# Patient Record
Sex: MALE | Race: WHITE | NOT HISPANIC OR LATINO | Employment: UNEMPLOYED | URBAN - METROPOLITAN AREA
[De-identification: names, ages, dates, MRNs, and addresses within clinical notes are randomized per-mention and may not be internally consistent; named-entity substitution may affect disease eponyms.]

---

## 2023-05-03 ENCOUNTER — DOCUMENTATION (OUTPATIENT)
Dept: CARE COORDINATION | Facility: CLINIC | Age: 63
End: 2023-05-03

## 2023-05-31 ENCOUNTER — DOCUMENTATION (OUTPATIENT)
Dept: CARE COORDINATION | Facility: CLINIC | Age: 63
End: 2023-05-31

## 2023-12-17 ENCOUNTER — APPOINTMENT (OUTPATIENT)
Dept: RADIOLOGY | Facility: HOSPITAL | Age: 63
End: 2023-12-17
Payer: MEDICAID

## 2023-12-17 ENCOUNTER — HOSPITAL ENCOUNTER (EMERGENCY)
Facility: HOSPITAL | Age: 63
Discharge: HOME | End: 2023-12-17
Attending: EMERGENCY MEDICINE
Payer: MEDICAID

## 2023-12-17 ENCOUNTER — APPOINTMENT (OUTPATIENT)
Dept: CARDIOLOGY | Facility: HOSPITAL | Age: 63
End: 2023-12-17
Payer: MEDICAID

## 2023-12-17 VITALS
DIASTOLIC BLOOD PRESSURE: 90 MMHG | TEMPERATURE: 96.8 F | OXYGEN SATURATION: 97 % | BODY MASS INDEX: 35.35 KG/M2 | SYSTOLIC BLOOD PRESSURE: 140 MMHG | HEART RATE: 65 BPM | RESPIRATION RATE: 16 BRPM | WEIGHT: 246.91 LBS | HEIGHT: 70 IN

## 2023-12-17 DIAGNOSIS — R07.89 OTHER CHEST PAIN: Primary | ICD-10-CM

## 2023-12-17 LAB
ALBUMIN SERPL BCP-MCNC: 4.2 G/DL (ref 3.4–5)
ALP SERPL-CCNC: 65 U/L (ref 33–136)
ALT SERPL W P-5'-P-CCNC: 17 U/L (ref 10–52)
ANION GAP SERPL CALC-SCNC: 11 MMOL/L (ref 10–20)
AST SERPL W P-5'-P-CCNC: 14 U/L (ref 9–39)
BASOPHILS # BLD AUTO: 0.06 X10*3/UL (ref 0–0.1)
BASOPHILS NFR BLD AUTO: 0.6 %
BILIRUB SERPL-MCNC: 0.5 MG/DL (ref 0–1.2)
BUN SERPL-MCNC: 13 MG/DL (ref 6–23)
CALCIUM SERPL-MCNC: 9.2 MG/DL (ref 8.6–10.3)
CARDIAC TROPONIN I PNL SERPL HS: 8 NG/L (ref 0–20)
CARDIAC TROPONIN I PNL SERPL HS: 8 NG/L (ref 0–20)
CHLORIDE SERPL-SCNC: 104 MMOL/L (ref 98–107)
CO2 SERPL-SCNC: 29 MMOL/L (ref 21–32)
CREAT SERPL-MCNC: 1.22 MG/DL (ref 0.5–1.3)
D DIMER PPP FEU-MCNC: 476 NG/ML FEU
EOSINOPHIL # BLD AUTO: 0.13 X10*3/UL (ref 0–0.7)
EOSINOPHIL NFR BLD AUTO: 1.4 %
ERYTHROCYTE [DISTWIDTH] IN BLOOD BY AUTOMATED COUNT: 13.5 % (ref 11.5–14.5)
GFR SERPL CREATININE-BSD FRML MDRD: 67 ML/MIN/1.73M*2
GLUCOSE SERPL-MCNC: 115 MG/DL (ref 74–99)
HCT VFR BLD AUTO: 42.2 % (ref 41–52)
HGB BLD-MCNC: 14.4 G/DL (ref 13.5–17.5)
HOLD SPECIMEN: NORMAL
HOLD SPECIMEN: NORMAL
IMM GRANULOCYTES # BLD AUTO: 0.05 X10*3/UL (ref 0–0.7)
IMM GRANULOCYTES NFR BLD AUTO: 0.5 % (ref 0–0.9)
LYMPHOCYTES # BLD AUTO: 1.32 X10*3/UL (ref 1.2–4.8)
LYMPHOCYTES NFR BLD AUTO: 13.8 %
MCH RBC QN AUTO: 38.2 PG (ref 26–34)
MCHC RBC AUTO-ENTMCNC: 34.1 G/DL (ref 32–36)
MCV RBC AUTO: 112 FL (ref 80–100)
MONOCYTES # BLD AUTO: 0.5 X10*3/UL (ref 0.1–1)
MONOCYTES NFR BLD AUTO: 5.2 %
NEUTROPHILS # BLD AUTO: 7.54 X10*3/UL (ref 1.2–7.7)
NEUTROPHILS NFR BLD AUTO: 78.5 %
NRBC BLD-RTO: 0 /100 WBCS (ref 0–0)
PLATELET # BLD AUTO: 216 X10*3/UL (ref 150–450)
POTASSIUM SERPL-SCNC: 4 MMOL/L (ref 3.5–5.3)
PROT SERPL-MCNC: 7.1 G/DL (ref 6.4–8.2)
RBC # BLD AUTO: 3.77 X10*6/UL (ref 4.5–5.9)
SODIUM SERPL-SCNC: 140 MMOL/L (ref 136–145)
WBC # BLD AUTO: 9.6 X10*3/UL (ref 4.4–11.3)

## 2023-12-17 PROCEDURE — 36415 COLL VENOUS BLD VENIPUNCTURE: CPT | Performed by: EMERGENCY MEDICINE

## 2023-12-17 PROCEDURE — 71045 X-RAY EXAM CHEST 1 VIEW: CPT | Performed by: RADIOLOGY

## 2023-12-17 PROCEDURE — 85025 COMPLETE CBC W/AUTO DIFF WBC: CPT | Performed by: EMERGENCY MEDICINE

## 2023-12-17 PROCEDURE — 99284 EMERGENCY DEPT VISIT MOD MDM: CPT | Performed by: EMERGENCY MEDICINE

## 2023-12-17 PROCEDURE — 84484 ASSAY OF TROPONIN QUANT: CPT | Performed by: EMERGENCY MEDICINE

## 2023-12-17 PROCEDURE — 71045 X-RAY EXAM CHEST 1 VIEW: CPT | Mod: FY

## 2023-12-17 PROCEDURE — 93005 ELECTROCARDIOGRAM TRACING: CPT

## 2023-12-17 PROCEDURE — 99283 EMERGENCY DEPT VISIT LOW MDM: CPT | Mod: 25

## 2023-12-17 PROCEDURE — 85379 FIBRIN DEGRADATION QUANT: CPT | Performed by: EMERGENCY MEDICINE

## 2023-12-17 PROCEDURE — 80053 COMPREHEN METABOLIC PANEL: CPT | Performed by: EMERGENCY MEDICINE

## 2023-12-17 RX ORDER — ASPIRIN 81 MG/1
TABLET ORAL
Status: ON HOLD | COMMUNITY
Start: 2022-07-28 | End: 2024-02-08 | Stop reason: SDUPTHER

## 2023-12-17 ASSESSMENT — COLUMBIA-SUICIDE SEVERITY RATING SCALE - C-SSRS
1. IN THE PAST MONTH, HAVE YOU WISHED YOU WERE DEAD OR WISHED YOU COULD GO TO SLEEP AND NOT WAKE UP?: NO
2. HAVE YOU ACTUALLY HAD ANY THOUGHTS OF KILLING YOURSELF?: NO
6. HAVE YOU EVER DONE ANYTHING, STARTED TO DO ANYTHING, OR PREPARED TO DO ANYTHING TO END YOUR LIFE?: NO

## 2023-12-17 ASSESSMENT — PAIN DESCRIPTION - DESCRIPTORS: DESCRIPTORS: PRESSURE

## 2023-12-17 ASSESSMENT — PAIN SCALES - GENERAL
PAINLEVEL_OUTOF10: 0 - NO PAIN
PAINLEVEL_OUTOF10: 3

## 2023-12-17 ASSESSMENT — PAIN - FUNCTIONAL ASSESSMENT
PAIN_FUNCTIONAL_ASSESSMENT: 0-10
PAIN_FUNCTIONAL_ASSESSMENT: 0-10

## 2023-12-17 ASSESSMENT — PAIN DESCRIPTION - LOCATION: LOCATION: CHEST

## 2023-12-17 ASSESSMENT — PAIN DESCRIPTION - PAIN TYPE: TYPE: ACUTE PAIN

## 2023-12-17 NOTE — DISCHARGE INSTRUCTIONS
Continue home medications.    Call your cardiologist in the morning for outpatient follow-up.    Return for worsening symptoms or concerns.

## 2023-12-17 NOTE — ED PROVIDER NOTES
[facility]  ED  Provider Note  12/17/2023 10:28 AM  AC03/AC03        History of Present Illness:   Luis Bucio is a 63 y.o. male presenting to the ED for chest discomfort, beginning 2 hours prior to arrival.  The complaint has been resolved, moderate in severity, and worsened by nothing.  Patient awake this morning feeling very diaphoretic.  He is not nauseous or short of breath.  He had about an hour and a half of chest discomfort which resolved prior to ED arrival.  He denies any current sweats or chest discomfort.  He is status post cardiac stents and MI last year.  He denies any current symptomology feels well.  There is no recent change in medication or diet.  He has had no fever or chills.  He has no leg pain swelling or edema.      Review of Systems:   Pertinent positives and review of systems as noted above.  Remaining 10 review of systems is negative or noncontributory to today's episode of care.  Review of Systems       --------------------------------------------- PAST HISTORY ---------------------------------------------  Past Medical History:  has a past medical history of Other specified health status.    Past Surgical History:  has a past surgical history that includes Other surgical history (09/06/2016).    Social History:      Family History: family history is not on file. Unless otherwise noted, family history is non contributory    Patient's Medications   New Prescriptions    No medications on file   Previous Medications    ASPIRIN (ADULT LOW DOSE ASPIRIN) 81 MG EC TABLET    1 tablet DAILY (route: oral)   Modified Medications    No medications on file   Discontinued Medications    No medications on file      The patient’s home medications have been reviewed.    Allergies: Patient has no allergy information on record.    -------------------------------------------------- RESULTS -------------------------------------------------  All laboratory and radiology results have been  personally reviewed by myself   LABS:  Labs Reviewed   COMPREHENSIVE METABOLIC PANEL - Abnormal       Result Value    Glucose 115 (*)     Sodium 140      Potassium 4.0      Chloride 104      Bicarbonate 29      Anion Gap 11      Urea Nitrogen 13      Creatinine 1.22      eGFR 67      Calcium 9.2      Albumin 4.2      Alkaline Phosphatase 65      Total Protein 7.1      AST 14      Bilirubin, Total 0.5      ALT 17     CBC WITH AUTO DIFFERENTIAL - Abnormal    WBC 9.6      nRBC 0.0      RBC 3.77 (*)     Hemoglobin 14.4      Hematocrit 42.2       (*)     MCH 38.2 (*)     MCHC 34.1      RDW 13.5      Platelets 216      Neutrophils % 78.5      Immature Granulocytes %, Automated 0.5      Lymphocytes % 13.8      Monocytes % 5.2      Eosinophils % 1.4      Basophils % 0.6      Neutrophils Absolute 7.54      Immature Granulocytes Absolute, Automated 0.05      Lymphocytes Absolute 1.32      Monocytes Absolute 0.50      Eosinophils Absolute 0.13      Basophils Absolute 0.06     D-DIMER, VTE EXCLUSION - Normal    D-Dimer, Quantitative VTE Exclusion 476      Narrative:     The VTE Exclusion D-Dimer assay is reported in ng/mL Fibrinogen Equivalent Units (FEU).    Per 's instructions for use, a value of less than 500 ng/mL (FEU) may help to exclude DVT or PE in outpatients when the assay is used with a clinical pretest probability assessment.([facility] must utilize and document eCalc 'Wells Score Deep Vein Thrombosis Risk' for DVT exclusion only. Emergency Department should utilize  Guidelines for Emergency Department Use of the VTE Exclusion D-Dimer and Clinical Pretest probability assessment model for DVT or PE exclusion.)   SERIAL TROPONIN-INITIAL - Normal    Troponin I, High Sensitivity 8      Narrative:     Less than 99th percentile of normal range cutoff-  Female and children under 18 years old <14 ng/L; Male <21 ng/L: Negative  Repeat testing should be performed if clinically indicated.     Female and children  under 18 years old 14-50 ng/L; Male 21-50 ng/L:  Consistent with possible cardiac damage and possible increased clinical   risk. Serial measurements may help to assess extent of myocardial damage.     >50 ng/L: Consistent with cardiac damage, increased clinical risk and  myocardial infarction. Serial measurements may help assess extent of   myocardial damage.      NOTE: Children less than 1 year old may have higher baseline troponin   levels and results should be interpreted in conjunction with the overall   clinical context.     NOTE: Troponin I testing is performed using a different   testing methodology at [facility] than at other   [facility]. Direct result comparisons should only   be made within the same method.   SERIAL TROPONIN, 1 HOUR - Normal    Troponin I, High Sensitivity 8      Narrative:     Less than 99th percentile of normal range cutoff-  Female and children under 18 years old <14 ng/L; Male <21 ng/L: Negative  Repeat testing should be performed if clinically indicated.     Female and children under 18 years old 14-50 ng/L; Male 21-50 ng/L:  Consistent with possible cardiac damage and possible increased clinical   risk. Serial measurements may help to assess extent of myocardial damage.     >50 ng/L: Consistent with cardiac damage, increased clinical risk and  myocardial infarction. Serial measurements may help assess extent of   myocardial damage.      NOTE: Children less than 1 year old may have higher baseline troponin   levels and results should be interpreted in conjunction with the overall   clinical context.     NOTE: Troponin I testing is performed using a different   testing methodology at [facility] than at other   [facility]. Direct result comparisons should only   be made within the same method.   TROPONIN SERIES- (INITIAL, 1 HR)    Narrative:     The following orders were created for panel order Troponin I Series, High Sensitivity (0, 1  HR).  Procedure                               Abnormality         Status                     ---------                               -----------         ------                     Troponin I, High Sensiti...[[id number]]  Normal              Final result               Troponin, High Sensitivi...[[id number]]  Normal              Final result                 Please view results for these tests on the individual orders.   GRAY TOP       EKG: Normal sinus rhythm at 60 bpm, LVH by voltage, reports this abnormalities noted.  No acute ST elevations.  Interpreted by CATRACHO Mead MD    RADIOLOGY:  Interpreted by Radiologist.  XR chest 1 view   Final Result   No acute cardiopulmonary process is evident.        MACRO:   None        Signed by: Rajiv Alexander 12/17/2023 11:01 AM   Dictation workstation:   [id number]          No results found for this or any previous visit (from the past 4464 hour(s)).  ------------------------- NURSING NOTES AND VITALS REVIEWED ---------------------------   The nursing notes within the ED encounter and vital signs as below have been reviewed.   There were no vitals taken for this visit.  Oxygen Saturation Interpretation: Normal      ---------------------------------------------------PHYSICAL EXAM--------------------------------------  Physical Exam   Constitutional/General: Alert and oriented x3, well appearing, non toxic in NAD  Head: Normocephalic and atraumatic  Eyes: PERRL, EOMI, conjunctiva normal, sclera non icteric  Mouth: Oropharynx clear, handling secretions, no trismus, no asymmetry of the posterior oropharynx or uvular edema  Neck: Supple, full ROM, non tender to palpation in the midline, no stridor, no crepitus, no meningeal signs  Respiratory: Lungs clear to auscultation bilaterally, no wheezes, rales, or rhonchi. Not in respiratory distress  Cardiovascular:  Regular rate. Regular rhythm. No murmurs, gallops, or rubs. 2+ distal pulses  Chest: No chest wall tenderness  GI:  Abdomen  Soft, Non tender, Non distended.  +BS. No organomegaly, no palpable masses,  No rebound, guarding, or rigidity.   Musculoskeletal: Moves all extremities x 4. Warm and well perfused, no clubbing, cyanosis, or edema. Capillary refill <3 seconds  Integument: skin warm and dry. No rashes.   Lymphatic: no lymphadenopathy noted  Neurologic: GCS 15, no focal deficits, symmetric strength 5/5 in the upper and lower extremities bilaterally  Psychiatric: Normal Affect    Procedures    ------------------------------ ED COURSE/MEDICAL DECISION MAKING----------------------    Medical Decision Making:   Patient is a 63-year-old male status post multiple cardiac stents in the past.  He presented today for evaluation of diaphoresis upon awakening, some mild chest discomfort that lasted about an hour.  All the symptoms resolved prior to ED presentation.  He has not had any similar symptoms since he had his stents placed.  His EKG shows no acute changes.  His cardiac enzymes are normal x 2.  The chest x-ray is negative.  D-dimer testing is negative.  He has no abdominal epigastric tenderness or pulsatile mass.  He is stable for further outpatient evaluation with cardiology.  He was advised to return to the ER for any worsening symptoms or concerns.  Diagnoses as of 12/17/23 9040   Other chest pain      Counseling:   The emergency provider has spoken with the patient and discussed today’s results, in addition to providing specific details for the plan of care and counseling regarding the diagnosis and prognosis.  Questions are answered at this time and they are agreeable with the plan.      --------------------------------- IMPRESSION AND DISPOSITION ---------------------------------        IMPRESSION  1. Other chest pain        DISPOSITION  Disposition: Discharge to home  Patient condition is good      Billing Provider Critical Care Time: 0 minutes     Bobby Mead MD  12/17/23 0743

## 2023-12-21 ENCOUNTER — APPOINTMENT (OUTPATIENT)
Dept: RADIOLOGY | Facility: HOSPITAL | Age: 63
End: 2023-12-21
Payer: MEDICAID

## 2023-12-21 ENCOUNTER — HOSPITAL ENCOUNTER (EMERGENCY)
Facility: HOSPITAL | Age: 63
Discharge: OTHER INSTITUTION | End: 2023-12-22
Attending: EMERGENCY MEDICINE
Payer: MEDICAID

## 2023-12-21 DIAGNOSIS — K85.90 ACUTE PANCREATITIS, UNSPECIFIED COMPLICATION STATUS, UNSPECIFIED PANCREATITIS TYPE (HHS-HCC): Primary | ICD-10-CM

## 2023-12-21 DIAGNOSIS — D72.829 LEUKOCYTOSIS, UNSPECIFIED TYPE: ICD-10-CM

## 2023-12-21 LAB
ALBUMIN SERPL BCP-MCNC: 4.5 G/DL (ref 3.4–5)
ALP SERPL-CCNC: 82 U/L (ref 33–136)
ALT SERPL W P-5'-P-CCNC: 90 U/L (ref 10–52)
AMORPH CRY #/AREA UR COMP ASSIST: ABNORMAL /HPF
ANION GAP SERPL CALC-SCNC: 14 MMOL/L (ref 10–20)
APPEARANCE UR: ABNORMAL
AST SERPL W P-5'-P-CCNC: 55 U/L (ref 9–39)
BASOPHILS # BLD AUTO: 0.04 X10*3/UL (ref 0–0.1)
BASOPHILS NFR BLD AUTO: 0.2 %
BILIRUB SERPL-MCNC: 2.1 MG/DL (ref 0–1.2)
BILIRUB UR STRIP.AUTO-MCNC: NEGATIVE MG/DL
BUN SERPL-MCNC: 22 MG/DL (ref 6–23)
CALCIUM SERPL-MCNC: 9.3 MG/DL (ref 8.6–10.3)
CHLORIDE SERPL-SCNC: 102 MMOL/L (ref 98–107)
CO2 SERPL-SCNC: 26 MMOL/L (ref 21–32)
COLOR UR: ABNORMAL
CREAT SERPL-MCNC: 1.21 MG/DL (ref 0.5–1.3)
EOSINOPHIL # BLD AUTO: 0.01 X10*3/UL (ref 0–0.7)
EOSINOPHIL NFR BLD AUTO: 0 %
ERYTHROCYTE [DISTWIDTH] IN BLOOD BY AUTOMATED COUNT: 13.4 % (ref 11.5–14.5)
FLUAV RNA RESP QL NAA+PROBE: NOT DETECTED
FLUBV RNA RESP QL NAA+PROBE: NOT DETECTED
GFR SERPL CREATININE-BSD FRML MDRD: 68 ML/MIN/1.73M*2
GLUCOSE SERPL-MCNC: 122 MG/DL (ref 74–99)
GLUCOSE UR STRIP.AUTO-MCNC: NEGATIVE MG/DL
HCT VFR BLD AUTO: 44.2 % (ref 41–52)
HGB BLD-MCNC: 15.5 G/DL (ref 13.5–17.5)
HOLD SPECIMEN: NORMAL
HOLD SPECIMEN: NORMAL
IMM GRANULOCYTES # BLD AUTO: 0.08 X10*3/UL (ref 0–0.7)
IMM GRANULOCYTES NFR BLD AUTO: 0.4 % (ref 0–0.9)
KETONES UR STRIP.AUTO-MCNC: ABNORMAL MG/DL
LACTATE SERPL-SCNC: 1.5 MMOL/L (ref 0.4–2)
LEUKOCYTE ESTERASE UR QL STRIP.AUTO: NEGATIVE
LIPASE SERPL-CCNC: 698 U/L (ref 9–82)
LYMPHOCYTES # BLD AUTO: 2 X10*3/UL (ref 1.2–4.8)
LYMPHOCYTES NFR BLD AUTO: 9.7 %
MCH RBC QN AUTO: 38.4 PG (ref 26–34)
MCHC RBC AUTO-ENTMCNC: 35.1 G/DL (ref 32–36)
MCV RBC AUTO: 109 FL (ref 80–100)
MONOCYTES # BLD AUTO: 0.88 X10*3/UL (ref 0.1–1)
MONOCYTES NFR BLD AUTO: 4.3 %
MUCOUS THREADS #/AREA URNS AUTO: ABNORMAL /LPF
NEUTROPHILS # BLD AUTO: 17.69 X10*3/UL (ref 1.2–7.7)
NEUTROPHILS NFR BLD AUTO: 85.4 %
NITRITE UR QL STRIP.AUTO: NEGATIVE
NRBC BLD-RTO: 0 /100 WBCS (ref 0–0)
PH UR STRIP.AUTO: 5 [PH]
PLATELET # BLD AUTO: 266 X10*3/UL (ref 150–450)
POTASSIUM SERPL-SCNC: 4 MMOL/L (ref 3.5–5.3)
PROT SERPL-MCNC: 7.4 G/DL (ref 6.4–8.2)
PROT UR STRIP.AUTO-MCNC: ABNORMAL MG/DL
RBC # BLD AUTO: 4.04 X10*6/UL (ref 4.5–5.9)
RBC # UR STRIP.AUTO: NEGATIVE /UL
RBC #/AREA URNS AUTO: ABNORMAL /HPF
SARS-COV-2 RNA RESP QL NAA+PROBE: NOT DETECTED
SODIUM SERPL-SCNC: 138 MMOL/L (ref 136–145)
SP GR UR STRIP.AUTO: 1.03
UROBILINOGEN UR STRIP.AUTO-MCNC: 2 MG/DL
WBC # BLD AUTO: 20.7 X10*3/UL (ref 4.4–11.3)
WBC #/AREA URNS AUTO: ABNORMAL /HPF

## 2023-12-21 PROCEDURE — 36415 COLL VENOUS BLD VENIPUNCTURE: CPT | Performed by: EMERGENCY MEDICINE

## 2023-12-21 PROCEDURE — 2500000004 HC RX 250 GENERAL PHARMACY W/ HCPCS (ALT 636 FOR OP/ED): Mod: SE

## 2023-12-21 PROCEDURE — 80061 LIPID PANEL: CPT

## 2023-12-21 PROCEDURE — 74176 CT ABD & PELVIS W/O CONTRAST: CPT | Performed by: RADIOLOGY

## 2023-12-21 PROCEDURE — 81001 URINALYSIS AUTO W/SCOPE: CPT | Performed by: EMERGENCY MEDICINE

## 2023-12-21 PROCEDURE — 74176 CT ABD & PELVIS W/O CONTRAST: CPT

## 2023-12-21 PROCEDURE — 96374 THER/PROPH/DIAG INJ IV PUSH: CPT | Mod: 59

## 2023-12-21 PROCEDURE — 96361 HYDRATE IV INFUSION ADD-ON: CPT

## 2023-12-21 PROCEDURE — 96366 THER/PROPH/DIAG IV INF ADDON: CPT

## 2023-12-21 PROCEDURE — 96365 THER/PROPH/DIAG IV INF INIT: CPT

## 2023-12-21 PROCEDURE — 85025 COMPLETE CBC W/AUTO DIFF WBC: CPT | Performed by: EMERGENCY MEDICINE

## 2023-12-21 PROCEDURE — 80053 COMPREHEN METABOLIC PANEL: CPT | Performed by: EMERGENCY MEDICINE

## 2023-12-21 PROCEDURE — 82077 ASSAY SPEC XCP UR&BREATH IA: CPT

## 2023-12-21 PROCEDURE — 99285 EMERGENCY DEPT VISIT HI MDM: CPT | Performed by: EMERGENCY MEDICINE

## 2023-12-21 PROCEDURE — 83690 ASSAY OF LIPASE: CPT | Performed by: EMERGENCY MEDICINE

## 2023-12-21 PROCEDURE — 96376 TX/PRO/DX INJ SAME DRUG ADON: CPT

## 2023-12-21 PROCEDURE — 2500000004 HC RX 250 GENERAL PHARMACY W/ HCPCS (ALT 636 FOR OP/ED): Mod: SE | Performed by: EMERGENCY MEDICINE

## 2023-12-21 PROCEDURE — 83605 ASSAY OF LACTIC ACID: CPT | Performed by: EMERGENCY MEDICINE

## 2023-12-21 PROCEDURE — 87636 SARSCOV2 & INF A&B AMP PRB: CPT | Performed by: EMERGENCY MEDICINE

## 2023-12-21 PROCEDURE — 96375 TX/PRO/DX INJ NEW DRUG ADDON: CPT

## 2023-12-21 RX ORDER — ONDANSETRON HYDROCHLORIDE 2 MG/ML
INJECTION, SOLUTION INTRAVENOUS
Status: COMPLETED
Start: 2023-12-21 | End: 2023-12-21

## 2023-12-21 RX ORDER — ONDANSETRON HYDROCHLORIDE 2 MG/ML
4 INJECTION, SOLUTION INTRAVENOUS ONCE
Status: COMPLETED | OUTPATIENT
Start: 2023-12-21 | End: 2023-12-21

## 2023-12-21 RX ORDER — METOCLOPRAMIDE HYDROCHLORIDE 5 MG/ML
INJECTION INTRAMUSCULAR; INTRAVENOUS
Status: COMPLETED
Start: 2023-12-21 | End: 2023-12-21

## 2023-12-21 RX ORDER — MORPHINE SULFATE 4 MG/ML
4 INJECTION INTRAVENOUS ONCE
Status: COMPLETED | OUTPATIENT
Start: 2023-12-21 | End: 2023-12-21

## 2023-12-21 RX ORDER — METOCLOPRAMIDE HYDROCHLORIDE 5 MG/ML
10 INJECTION INTRAMUSCULAR; INTRAVENOUS ONCE
Status: COMPLETED | OUTPATIENT
Start: 2023-12-21 | End: 2023-12-21

## 2023-12-21 RX ORDER — MORPHINE SULFATE 4 MG/ML
INJECTION INTRAVENOUS
Status: COMPLETED
Start: 2023-12-21 | End: 2023-12-21

## 2023-12-21 RX ADMIN — MORPHINE SULFATE 4 MG: 4 INJECTION INTRAVENOUS at 22:15

## 2023-12-21 RX ADMIN — MORPHINE SULFATE 4 MG: 4 INJECTION INTRAVENOUS at 17:15

## 2023-12-21 RX ADMIN — ONDANSETRON 4 MG: 2 INJECTION INTRAMUSCULAR; INTRAVENOUS at 15:58

## 2023-12-21 RX ADMIN — SODIUM CHLORIDE 1000 ML: 9 INJECTION, SOLUTION INTRAVENOUS at 17:26

## 2023-12-21 RX ADMIN — ONDANSETRON 4 MG: 2 INJECTION INTRAMUSCULAR; INTRAVENOUS at 22:17

## 2023-12-21 RX ADMIN — SODIUM CHLORIDE 1000 ML: 9 INJECTION, SOLUTION INTRAVENOUS at 15:54

## 2023-12-21 RX ADMIN — METOCLOPRAMIDE 10 MG: 5 INJECTION, SOLUTION INTRAMUSCULAR; INTRAVENOUS at 17:26

## 2023-12-21 RX ADMIN — PIPERACILLIN SODIUM AND TAZOBACTAM SODIUM 4.5 G: 4; .5 INJECTION, SOLUTION INTRAVENOUS at 18:28

## 2023-12-21 RX ADMIN — ONDANSETRON HYDROCHLORIDE 4 MG: 2 INJECTION, SOLUTION INTRAVENOUS at 22:17

## 2023-12-21 RX ADMIN — METOCLOPRAMIDE HYDROCHLORIDE 10 MG: 5 INJECTION INTRAMUSCULAR; INTRAVENOUS at 17:26

## 2023-12-21 ASSESSMENT — PAIN - FUNCTIONAL ASSESSMENT: PAIN_FUNCTIONAL_ASSESSMENT: 0-10

## 2023-12-21 ASSESSMENT — COLUMBIA-SUICIDE SEVERITY RATING SCALE - C-SSRS
2. HAVE YOU ACTUALLY HAD ANY THOUGHTS OF KILLING YOURSELF?: NO
6. HAVE YOU EVER DONE ANYTHING, STARTED TO DO ANYTHING, OR PREPARED TO DO ANYTHING TO END YOUR LIFE?: NO
1. IN THE PAST MONTH, HAVE YOU WISHED YOU WERE DEAD OR WISHED YOU COULD GO TO SLEEP AND NOT WAKE UP?: NO

## 2023-12-21 ASSESSMENT — PAIN DESCRIPTION - LOCATION: LOCATION: ABDOMEN

## 2023-12-21 ASSESSMENT — PAIN SCALES - GENERAL
PAINLEVEL_OUTOF10: 8
PAINLEVEL_OUTOF10: 5 - MODERATE PAIN
PAINLEVEL_OUTOF10: 0 - NO PAIN

## 2023-12-21 ASSESSMENT — PAIN DESCRIPTION - PAIN TYPE: TYPE: ACUTE PAIN

## 2023-12-21 NOTE — ED PROVIDER NOTES
HPI   Chief Complaint   Patient presents with    Flu Symptoms     After eating lunch yesterday, pt began having stomach cramps, vomiting. Later turned into cold chills and sweats. Hasn't had anything to eat since yesterday. Today bloated and feeling woozy.       Patient presents with a chief complaint of abdominal pain for the past 2 days.  He states that it was low in his abdominal area and now has spread to the middle.  Patient has had some nausea, vomiting, and diarrhea.  Patient denies any previous surgeries.  The last time he has eaten was yesterday.    ROS:    CONSTITUTIONAL: Denies weight loss, fever and chills    HEENT: Denies changes in vision and hearing, No sore throat    RESPIRATORY: Denies SOB and cough    CV: Denies palpitations or chest pain    GI: Abdominal pain, nausea, vomiting and diarrhea    : Denies dysuria and urinary frequency    MUSCULOSKELETAL: Denies myalgia and joint pain    SKIN: Denies rash and pruritus    NEUROLOGICAL: Denies headache and syncope    PSYCHIATRIC: Denies recent changes in mood. Denies anxiety and depression    PHYSICAL EXAM:    GENERAL: Alert and oriented x 3. No acute distress. Well-nourished    EYES: EOMI. Anicteric    HEENT: Moist mucous membranes. No scleral icterus. No cervical lymphadenopathy    LUNGS: Clear to auscultation bilaterally. No accessory muscle use    CARDIOVASCULAR: Regular rate and rhythm. No murmur. No JVD    ABDOMEN: Soft, distended and mid abdominal tenderness with palpation    EXTREMITIES: No edema. Non-tender    SKIN: No rashes or lesions    NEUROLOGIC: No focal neurological deficits. CN II-XII grossly intact    PSYCHIATRIC: Cooperative. Appropriate mood and affect    Medical Decision Making:    Differential Diagnosis: Pancreatitis, cholecystitis, appendicitis, enteritis    Clinical Laboratory Review: Reviewed all labs revealing 20,000 white count, amylase and liver enzymes elevated as well    Imaging Review: Reviewed CAT scan which showed  significant inflammation around the pancreas and duodenum suspicious for pancreatitis    Discussion with Consulting Physician: Discussed initially with general surgery regarding transfer to [facility] [place].  I then spoke with the hospitalist at [facility] [place], Dr. Bull at 1600 who has agreed to accept this patient.  There is no imipenem in the hospital so Zosyn will be ordered at this time    Treatment Plan:    Follow Up:  Follow up with your primary care physician as soon as possible    Return Precautions:  Return to the emergency department if symptoms worsen at any time                          [place] Coma Scale Score: 15                  Patient History   Past Medical History:   Diagnosis Date    Other specified health status     No known problems     Past Surgical History:   Procedure Laterality Date    OTHER SURGICAL HISTORY  09/06/2016    Leg Incision And Drainage     No family history on file.  Social History     Tobacco Use    Smoking status: Former     Types: Cigarettes    Smokeless tobacco: Never   Substance Use Topics    Alcohol use: Not Currently    Drug use: Yes     Types: Marijuana     Comment: weekly       Physical Exam   ED Triage Vitals [12/21/23 1546]   Temp Heart Rate Resp BP   36.6 °C (97.8 °F) 93 20 (!) 141/100      SpO2 Temp Source Heart Rate Source Patient Position   97 % Temporal -- --      BP Location FiO2 (%)     -- --       Physical Exam    ED Course & MDM   Diagnoses as of 12/21/23 1829   Acute pancreatitis, unspecified complication status, unspecified pancreatitis type   Leukocytosis, unspecified type       Medical Decision Making      Procedure  Procedures    Diagnoses as of 12/21/23 1830   Acute pancreatitis, unspecified complication status, unspecified pancreatitis type   Leukocytosis, unspecified type          Bethany Flanagan,   12/21/23 1830

## 2023-12-22 ENCOUNTER — HOSPITAL ENCOUNTER (INPATIENT)
Facility: HOSPITAL | Age: 63
LOS: 3 days | Discharge: HOME | End: 2023-12-25
Attending: STUDENT IN AN ORGANIZED HEALTH CARE EDUCATION/TRAINING PROGRAM | Admitting: STUDENT IN AN ORGANIZED HEALTH CARE EDUCATION/TRAINING PROGRAM
Payer: MEDICAID

## 2023-12-22 ENCOUNTER — ANCILLARY PROCEDURE (OUTPATIENT)
Dept: RADIOLOGY | Facility: HOSPITAL | Age: 63
End: 2023-12-22
Payer: MEDICAID

## 2023-12-22 VITALS
HEART RATE: 99 BPM | TEMPERATURE: 97.5 F | RESPIRATION RATE: 17 BRPM | BODY MASS INDEX: 35.79 KG/M2 | SYSTOLIC BLOOD PRESSURE: 125 MMHG | DIASTOLIC BLOOD PRESSURE: 83 MMHG | OXYGEN SATURATION: 95 % | HEIGHT: 70 IN | WEIGHT: 250 LBS

## 2023-12-22 DIAGNOSIS — I25.10 CORONARY ARTERY DISEASE, UNSPECIFIED VESSEL OR LESION TYPE, UNSPECIFIED WHETHER ANGINA PRESENT, UNSPECIFIED WHETHER NATIVE OR TRANSPLANTED HEART: ICD-10-CM

## 2023-12-22 DIAGNOSIS — E78.5 HYPERLIPIDEMIA, UNSPECIFIED HYPERLIPIDEMIA TYPE: ICD-10-CM

## 2023-12-22 DIAGNOSIS — K85.90 ACUTE PANCREATITIS, UNSPECIFIED COMPLICATION STATUS, UNSPECIFIED PANCREATITIS TYPE (HHS-HCC): Primary | ICD-10-CM

## 2023-12-22 DIAGNOSIS — I50.9 HEART FAILURE, UNSPECIFIED HF CHRONICITY, UNSPECIFIED HEART FAILURE TYPE (MULTI): ICD-10-CM

## 2023-12-22 LAB
ALBUMIN SERPL BCP-MCNC: 3.6 G/DL (ref 3.4–5)
ALP SERPL-CCNC: 71 U/L (ref 33–136)
ALT SERPL W P-5'-P-CCNC: 58 U/L (ref 10–52)
AMORPH CRY #/AREA UR COMP ASSIST: NORMAL /HPF
ANION GAP SERPL CALC-SCNC: 12 MMOL/L (ref 10–20)
APPEARANCE UR: ABNORMAL
AST SERPL W P-5'-P-CCNC: 31 U/L (ref 9–39)
BILIRUB SERPL-MCNC: 2 MG/DL (ref 0–1.2)
BILIRUB UR STRIP.AUTO-MCNC: NEGATIVE MG/DL
BUN SERPL-MCNC: 22 MG/DL (ref 6–23)
CALCIUM SERPL-MCNC: 8 MG/DL (ref 8.6–10.3)
CHLORIDE SERPL-SCNC: 105 MMOL/L (ref 98–107)
CHOLEST SERPL-MCNC: 205 MG/DL (ref 0–199)
CHOLESTEROL/HDL RATIO: 8.3
CO2 SERPL-SCNC: 25 MMOL/L (ref 21–32)
COLOR UR: YELLOW
CREAT SERPL-MCNC: 1.62 MG/DL (ref 0.5–1.3)
ERYTHROCYTE [DISTWIDTH] IN BLOOD BY AUTOMATED COUNT: 13.7 % (ref 11.5–14.5)
ETHANOL SERPL-MCNC: <10 MG/DL
GFR SERPL CREATININE-BSD FRML MDRD: 48 ML/MIN/1.73M*2
GLUCOSE SERPL-MCNC: 130 MG/DL (ref 74–99)
GLUCOSE UR STRIP.AUTO-MCNC: NEGATIVE MG/DL
HCT VFR BLD AUTO: 39.3 % (ref 41–52)
HDLC SERPL-MCNC: 24.6 MG/DL
HGB BLD-MCNC: 13.3 G/DL (ref 13.5–17.5)
KETONES UR STRIP.AUTO-MCNC: ABNORMAL MG/DL
LDLC SERPL CALC-MCNC: 157 MG/DL
LEUKOCYTE ESTERASE UR QL STRIP.AUTO: NEGATIVE
MCH RBC QN AUTO: 38.2 PG (ref 26–34)
MCHC RBC AUTO-ENTMCNC: 33.8 G/DL (ref 32–36)
MCV RBC AUTO: 113 FL (ref 80–100)
MUCOUS THREADS #/AREA URNS AUTO: NORMAL /LPF
NITRITE UR QL STRIP.AUTO: NEGATIVE
NON HDL CHOLESTEROL: 180 MG/DL (ref 0–149)
NRBC BLD-RTO: 0 /100 WBCS (ref 0–0)
PH UR STRIP.AUTO: 6 [PH]
PLATELET # BLD AUTO: 218 X10*3/UL (ref 150–450)
POTASSIUM SERPL-SCNC: 4.2 MMOL/L (ref 3.5–5.3)
PROT SERPL-MCNC: 6.5 G/DL (ref 6.4–8.2)
PROT UR STRIP.AUTO-MCNC: ABNORMAL MG/DL
RBC # BLD AUTO: 3.48 X10*6/UL (ref 4.5–5.9)
RBC # UR STRIP.AUTO: ABNORMAL /UL
RBC #/AREA URNS AUTO: NORMAL /HPF
SODIUM SERPL-SCNC: 138 MMOL/L (ref 136–145)
SP GR UR STRIP.AUTO: 1.02
TRIGL SERPL-MCNC: 119 MG/DL (ref 0–149)
UROBILINOGEN UR STRIP.AUTO-MCNC: 2 MG/DL
VLDL: 24 MG/DL (ref 0–40)
WBC # BLD AUTO: 20.1 X10*3/UL (ref 4.4–11.3)
WBC #/AREA URNS AUTO: NORMAL /HPF

## 2023-12-22 PROCEDURE — 81001 URINALYSIS AUTO W/SCOPE: CPT

## 2023-12-22 PROCEDURE — 96372 THER/PROPH/DIAG INJ SC/IM: CPT

## 2023-12-22 PROCEDURE — C9113 INJ PANTOPRAZOLE SODIUM, VIA: HCPCS

## 2023-12-22 PROCEDURE — 36415 COLL VENOUS BLD VENIPUNCTURE: CPT

## 2023-12-22 PROCEDURE — 2500000001 HC RX 250 WO HCPCS SELF ADMINISTERED DRUGS (ALT 637 FOR MEDICARE OP)

## 2023-12-22 PROCEDURE — 76705 ECHO EXAM OF ABDOMEN: CPT | Performed by: RADIOLOGY

## 2023-12-22 PROCEDURE — 2500000004 HC RX 250 GENERAL PHARMACY W/ HCPCS (ALT 636 FOR OP/ED)

## 2023-12-22 PROCEDURE — 76705 ECHO EXAM OF ABDOMEN: CPT

## 2023-12-22 PROCEDURE — 99222 1ST HOSP IP/OBS MODERATE 55: CPT | Performed by: STUDENT IN AN ORGANIZED HEALTH CARE EDUCATION/TRAINING PROGRAM

## 2023-12-22 PROCEDURE — 1100000001 HC PRIVATE ROOM DAILY

## 2023-12-22 PROCEDURE — 85027 COMPLETE CBC AUTOMATED: CPT

## 2023-12-22 PROCEDURE — 80053 COMPREHEN METABOLIC PANEL: CPT

## 2023-12-22 PROCEDURE — 94760 N-INVAS EAR/PLS OXIMETRY 1: CPT

## 2023-12-22 RX ORDER — PANTOPRAZOLE SODIUM 40 MG/10ML
40 INJECTION, POWDER, LYOPHILIZED, FOR SOLUTION INTRAVENOUS DAILY
Status: DISCONTINUED | OUTPATIENT
Start: 2023-12-22 | End: 2023-12-24

## 2023-12-22 RX ORDER — AMLODIPINE BESYLATE 5 MG/1
5 TABLET ORAL DAILY
Status: DISCONTINUED | OUTPATIENT
Start: 2023-12-22 | End: 2023-12-25 | Stop reason: HOSPADM

## 2023-12-22 RX ORDER — TRAZODONE HYDROCHLORIDE 50 MG/1
50 TABLET ORAL NIGHTLY
Status: DISCONTINUED | OUTPATIENT
Start: 2023-12-22 | End: 2023-12-25 | Stop reason: HOSPADM

## 2023-12-22 RX ORDER — METOPROLOL TARTRATE 25 MG/1
25 TABLET, FILM COATED ORAL 2 TIMES DAILY
Status: DISCONTINUED | OUTPATIENT
Start: 2023-12-22 | End: 2023-12-25 | Stop reason: HOSPADM

## 2023-12-22 RX ORDER — ATORVASTATIN CALCIUM 80 MG/1
80 TABLET, FILM COATED ORAL NIGHTLY
Status: DISCONTINUED | OUTPATIENT
Start: 2023-12-22 | End: 2023-12-25 | Stop reason: HOSPADM

## 2023-12-22 RX ORDER — ACETAMINOPHEN 325 MG/1
650 TABLET ORAL EVERY 4 HOURS PRN
Status: DISCONTINUED | OUTPATIENT
Start: 2023-12-22 | End: 2023-12-25 | Stop reason: HOSPADM

## 2023-12-22 RX ORDER — SODIUM CHLORIDE, SODIUM LACTATE, POTASSIUM CHLORIDE, CALCIUM CHLORIDE 600; 310; 30; 20 MG/100ML; MG/100ML; MG/100ML; MG/100ML
150 INJECTION, SOLUTION INTRAVENOUS CONTINUOUS
Status: DISCONTINUED | OUTPATIENT
Start: 2023-12-22 | End: 2023-12-24

## 2023-12-22 RX ORDER — ENOXAPARIN SODIUM 100 MG/ML
40 INJECTION SUBCUTANEOUS EVERY 24 HOURS
Status: DISCONTINUED | OUTPATIENT
Start: 2023-12-22 | End: 2023-12-25 | Stop reason: HOSPADM

## 2023-12-22 RX ORDER — OXYCODONE HYDROCHLORIDE 10 MG/1
10 TABLET ORAL EVERY 6 HOURS PRN
Status: DISCONTINUED | OUTPATIENT
Start: 2023-12-22 | End: 2023-12-25 | Stop reason: HOSPADM

## 2023-12-22 RX ORDER — ASPIRIN 81 MG/1
81 TABLET ORAL DAILY
Status: DISCONTINUED | OUTPATIENT
Start: 2023-12-22 | End: 2023-12-25 | Stop reason: HOSPADM

## 2023-12-22 RX ORDER — CLOPIDOGREL BISULFATE 75 MG/1
75 TABLET ORAL DAILY
Status: DISCONTINUED | OUTPATIENT
Start: 2023-12-22 | End: 2023-12-25 | Stop reason: HOSPADM

## 2023-12-22 RX ORDER — FUROSEMIDE 40 MG/1
20 TABLET ORAL DAILY
Status: DISCONTINUED | OUTPATIENT
Start: 2023-12-22 | End: 2023-12-25 | Stop reason: HOSPADM

## 2023-12-22 RX ORDER — ONDANSETRON HYDROCHLORIDE 2 MG/ML
4 INJECTION, SOLUTION INTRAVENOUS EVERY 6 HOURS PRN
Status: DISCONTINUED | OUTPATIENT
Start: 2023-12-22 | End: 2023-12-24

## 2023-12-22 RX ORDER — SODIUM CHLORIDE, SODIUM LACTATE, POTASSIUM CHLORIDE, CALCIUM CHLORIDE 600; 310; 30; 20 MG/100ML; MG/100ML; MG/100ML; MG/100ML
50 INJECTION, SOLUTION INTRAVENOUS CONTINUOUS
Status: DISCONTINUED | OUTPATIENT
Start: 2023-12-22 | End: 2023-12-22

## 2023-12-22 RX ORDER — OXYCODONE HYDROCHLORIDE 5 MG/1
5 TABLET ORAL EVERY 6 HOURS PRN
Status: DISCONTINUED | OUTPATIENT
Start: 2023-12-22 | End: 2023-12-25 | Stop reason: HOSPADM

## 2023-12-22 RX ADMIN — SODIUM CHLORIDE, POTASSIUM CHLORIDE, SODIUM LACTATE AND CALCIUM CHLORIDE 250 ML/HR: 600; 310; 30; 20 INJECTION, SOLUTION INTRAVENOUS at 09:30

## 2023-12-22 RX ADMIN — ATORVASTATIN CALCIUM 80 MG: 80 TABLET, FILM COATED ORAL at 20:01

## 2023-12-22 RX ADMIN — PIPERACILLIN SODIUM AND TAZOBACTAM SODIUM 3.38 G: 3; .375 INJECTION, SOLUTION INTRAVENOUS at 04:37

## 2023-12-22 RX ADMIN — OXYCODONE HYDROCHLORIDE 10 MG: 10 TABLET ORAL at 04:41

## 2023-12-22 RX ADMIN — ENOXAPARIN SODIUM 40 MG: 40 INJECTION SUBCUTANEOUS at 10:46

## 2023-12-22 RX ADMIN — CLOPIDOGREL 75 MG: 75 TABLET ORAL at 10:39

## 2023-12-22 RX ADMIN — OXYCODONE HYDROCHLORIDE 5 MG: 5 TABLET ORAL at 20:03

## 2023-12-22 RX ADMIN — OXYCODONE HYDROCHLORIDE 10 MG: 10 TABLET ORAL at 11:20

## 2023-12-22 RX ADMIN — METOPROLOL TARTRATE 25 MG: 25 TABLET, FILM COATED ORAL at 10:39

## 2023-12-22 RX ADMIN — TRAZODONE HYDROCHLORIDE 50 MG: 50 TABLET ORAL at 00:20

## 2023-12-22 RX ADMIN — SODIUM CHLORIDE, POTASSIUM CHLORIDE, SODIUM LACTATE AND CALCIUM CHLORIDE 50 ML/HR: 600; 310; 30; 20 INJECTION, SOLUTION INTRAVENOUS at 04:26

## 2023-12-22 RX ADMIN — ONDANSETRON 4 MG: 2 INJECTION INTRAMUSCULAR; INTRAVENOUS at 15:30

## 2023-12-22 RX ADMIN — METOPROLOL TARTRATE 25 MG: 25 TABLET, FILM COATED ORAL at 20:01

## 2023-12-22 RX ADMIN — SODIUM CHLORIDE, POTASSIUM CHLORIDE, SODIUM LACTATE AND CALCIUM CHLORIDE 250 ML/HR: 600; 310; 30; 20 INJECTION, SOLUTION INTRAVENOUS at 20:01

## 2023-12-22 RX ADMIN — ASPIRIN 81 MG: 81 TABLET, COATED ORAL at 10:39

## 2023-12-22 RX ADMIN — SODIUM CHLORIDE, POTASSIUM CHLORIDE, SODIUM LACTATE AND CALCIUM CHLORIDE 250 ML/HR: 600; 310; 30; 20 INJECTION, SOLUTION INTRAVENOUS at 11:38

## 2023-12-22 RX ADMIN — PANTOPRAZOLE SODIUM 40 MG: 40 INJECTION, POWDER, FOR SOLUTION INTRAVENOUS at 10:39

## 2023-12-22 SDOH — SOCIAL STABILITY: SOCIAL INSECURITY: DO YOU FEEL UNSAFE GOING BACK TO THE PLACE WHERE YOU ARE LIVING?: NO

## 2023-12-22 SDOH — SOCIAL STABILITY: SOCIAL INSECURITY: ARE THERE ANY APPARENT SIGNS OF INJURIES/BEHAVIORS THAT COULD BE RELATED TO ABUSE/NEGLECT?: NO

## 2023-12-22 SDOH — SOCIAL STABILITY: SOCIAL INSECURITY: DOES ANYONE TRY TO KEEP YOU FROM HAVING/CONTACTING OTHER FRIENDS OR DOING THINGS OUTSIDE YOUR HOME?: NO

## 2023-12-22 SDOH — SOCIAL STABILITY: SOCIAL INSECURITY: DO YOU FEEL ANYONE HAS EXPLOITED OR TAKEN ADVANTAGE OF YOU FINANCIALLY OR OF YOUR PERSONAL PROPERTY?: NO

## 2023-12-22 SDOH — SOCIAL STABILITY: SOCIAL INSECURITY: ARE YOU OR HAVE YOU BEEN THREATENED OR ABUSED PHYSICALLY, EMOTIONALLY, OR SEXUALLY BY ANYONE?: NO

## 2023-12-22 SDOH — SOCIAL STABILITY: SOCIAL INSECURITY: WERE YOU ABLE TO COMPLETE ALL THE BEHAVIORAL HEALTH SCREENINGS?: YES

## 2023-12-22 SDOH — SOCIAL STABILITY: SOCIAL INSECURITY: HAVE YOU HAD THOUGHTS OF HARMING ANYONE ELSE?: NO

## 2023-12-22 SDOH — SOCIAL STABILITY: SOCIAL INSECURITY: HAS ANYONE EVER THREATENED TO HURT YOUR FAMILY OR YOUR PETS?: NO

## 2023-12-22 SDOH — SOCIAL STABILITY: SOCIAL INSECURITY: ABUSE: ADULT

## 2023-12-22 ASSESSMENT — COGNITIVE AND FUNCTIONAL STATUS - GENERAL
DAILY ACTIVITIY SCORE: 24
DAILY ACTIVITIY SCORE: 24
MOBILITY SCORE: 24
PATIENT BASELINE BEDBOUND: NO
MOBILITY SCORE: 24
MOBILITY SCORE: 24
DAILY ACTIVITIY SCORE: 24

## 2023-12-22 ASSESSMENT — ACTIVITIES OF DAILY LIVING (ADL)
TOILETING: INDEPENDENT
JUDGMENT_ADEQUATE_SAFELY_COMPLETE_DAILY_ACTIVITIES: YES
WALKS IN HOME: INDEPENDENT
DRESSING YOURSELF: INDEPENDENT
LACK_OF_TRANSPORTATION: YES
ADEQUATE_TO_COMPLETE_ADL: YES
GROOMING: INDEPENDENT
LACK_OF_TRANSPORTATION: NO
HEARING - RIGHT EAR: FUNCTIONAL
PATIENT'S MEMORY ADEQUATE TO SAFELY COMPLETE DAILY ACTIVITIES?: YES
HEARING - LEFT EAR: FUNCTIONAL
BATHING: INDEPENDENT
FEEDING YOURSELF: INDEPENDENT

## 2023-12-22 ASSESSMENT — LIFESTYLE VARIABLES
AUDIT-C TOTAL SCORE: 0
SKIP TO QUESTIONS 9-10: 1
SUBSTANCE_ABUSE_PAST_12_MONTHS: YES
HOW OFTEN DO YOU HAVE A DRINK CONTAINING ALCOHOL: NEVER
AUDIT-C TOTAL SCORE: 0
HOW MANY STANDARD DRINKS CONTAINING ALCOHOL DO YOU HAVE ON A TYPICAL DAY: PATIENT DOES NOT DRINK
PRESCIPTION_ABUSE_PAST_12_MONTHS: NO
HOW OFTEN DO YOU HAVE 6 OR MORE DRINKS ON ONE OCCASION: NEVER

## 2023-12-22 ASSESSMENT — PAIN SCALES - GENERAL
PAINLEVEL_OUTOF10: 7
PAINLEVEL_OUTOF10: 5 - MODERATE PAIN
PAINLEVEL_OUTOF10: 4
PAINLEVEL_OUTOF10: 3
PAINLEVEL_OUTOF10: 2

## 2023-12-22 ASSESSMENT — COLUMBIA-SUICIDE SEVERITY RATING SCALE - C-SSRS
2. HAVE YOU ACTUALLY HAD ANY THOUGHTS OF KILLING YOURSELF?: NO
1. IN THE PAST MONTH, HAVE YOU WISHED YOU WERE DEAD OR WISHED YOU COULD GO TO SLEEP AND NOT WAKE UP?: NO
6. HAVE YOU EVER DONE ANYTHING, STARTED TO DO ANYTHING, OR PREPARED TO DO ANYTHING TO END YOUR LIFE?: NO

## 2023-12-22 ASSESSMENT — PAIN - FUNCTIONAL ASSESSMENT
PAIN_FUNCTIONAL_ASSESSMENT: 0-10
PAIN_FUNCTIONAL_ASSESSMENT: 0-10

## 2023-12-22 ASSESSMENT — PATIENT HEALTH QUESTIONNAIRE - PHQ9
1. LITTLE INTEREST OR PLEASURE IN DOING THINGS: NOT AT ALL
2. FEELING DOWN, DEPRESSED OR HOPELESS: NOT AT ALL
SUM OF ALL RESPONSES TO PHQ9 QUESTIONS 1 & 2: 0

## 2023-12-22 ASSESSMENT — PAIN DESCRIPTION - LOCATION: LOCATION: ABDOMEN

## 2023-12-22 NOTE — CARE PLAN
Pt admitted this shift from [place] ED for acute pancreatitis. Uneventful night. Pt rested comfortably. Pain controlled with current regimen. Pt NPO for treatment plan. LR running at 50/hr per order. Pt receiving IV zosyn. Pt continues to progress towards meeting goals. VSS. Will continue to monitor.

## 2023-12-22 NOTE — PROGRESS NOTES
Emergency Medicine Transition of Care Note.    I received Luis Bucio in signout from Dr. Flanagan.  Please see the previous ED provider note for all HPI, PE and MDM up to the time of signout at 8 PM. This is in addition to the primary record.    In brief Luis Bucio is an 63 y.o. male presenting for   Chief Complaint   Patient presents with    Flu Symptoms     After eating lunch yesterday, pt began having stomach cramps, vomiting. Later turned into cold chills and sweats. Hasn't had anything to eat since yesterday. Today bloated and feeling woozy.     At the time of signout we were awaiting: Patient with pancreatitis.  Accepted in transfer to [facility] and pending transfer.  Labs Reviewed   URINALYSIS WITH REFLEX MICROSCOPIC - Abnormal       Result Value    Color, Urine Rocio (*)     Appearance, Urine Hazy (*)     Specific Gravity, Urine 1.030      pH, Urine 5.0      Protein, Urine 30 (1+) (*)     Glucose, Urine NEGATIVE      Blood, Urine NEGATIVE      Ketones, Urine 5 (TRACE) (*)     Bilirubin, Urine NEGATIVE      Urobilinogen, Urine 2.0 (*)     Nitrite, Urine NEGATIVE      Leukocyte Esterase, Urine NEGATIVE     CBC WITH AUTO DIFFERENTIAL - Abnormal    WBC 20.7 (*)     nRBC 0.0      RBC 4.04 (*)     Hemoglobin 15.5      Hematocrit 44.2       (*)     MCH 38.4 (*)     MCHC 35.1      RDW 13.4      Platelets 266      Neutrophils % 85.4      Immature Granulocytes %, Automated 0.4      Lymphocytes % 9.7      Monocytes % 4.3      Eosinophils % 0.0      Basophils % 0.2      Neutrophils Absolute 17.69 (*)     Immature Granulocytes Absolute, Automated 0.08      Lymphocytes Absolute 2.00      Monocytes Absolute 0.88      Eosinophils Absolute 0.01      Basophils Absolute 0.04     LIPASE - Abnormal    Lipase 698 (*)     Narrative:     Venipuncture immediately after or during the administration of Metamizole may lead to falsely low results. Testing should be performed immediately prior to Metamizole  dosing.   COMPREHENSIVE METABOLIC PANEL - Abnormal    Glucose 122 (*)     Sodium 138      Potassium 4.0      Chloride 102      Bicarbonate 26      Anion Gap 14      Urea Nitrogen 22      Creatinine 1.21      eGFR 68      Calcium 9.3      Albumin 4.5      Alkaline Phosphatase 82      Total Protein 7.4      AST 55 (*)     Bilirubin, Total 2.1 (*)     ALT 90 (*)    MICROSCOPIC ONLY, URINE - Abnormal    WBC, Urine NONE      RBC, Urine NONE      Mucus, Urine 1+      Amorphous Crystals, Urine 3+ (*)    LACTATE - Normal    Lactate 1.5      Narrative:     Venipuncture immediately after or during the administration of Metamizole may lead to falsely low results. Testing should be performed immediately  prior to Metamizole dosing.   SARS-COV-2 PCR, SYMPTOMATIC - Normal    Coronavirus 2019, PCR Not Detected      Narrative:     This assay has received FDA Emergency Use Authorization (EUA) and is only authorized for the duration of time that circumstances exist to justify the authorization of the emergency use of in vitro diagnostic tests for the detection of SARS-CoV-2 virus and/or diagnosis of COVID-19 infection under section 564(b)(1) of the Act, 21 U.S.C. 360bbb-3(b)(1). This assay is an in vitro diagnostic nucleic acid amplification test for the qualitative detection of SARS-CoV-2 from nasopharyngeal specimens and has been validated for use at [facility]. Negative results do not preclude COVID-19 infections and should not be used as the sole basis for diagnosis, treatment, or other management decisions.     INFLUENZA A AND B PCR - Normal    Flu A Result Not Detected      Flu B Result Not Detected      Narrative:     This assay is an in vitro diagnostic multiplex nucleic acid amplification test for the detection and discrimination of Influenza A & B from nasopharyngeal specimens, and has been validated for use at [facility]. Negative results do not preclude Influenza A/B  infections, and should not be used as the sole basis for diagnosis, treatment, or other management decisions. If Influenza A/B and RSV PCR results are negative, testing for Parainfluenza virus, Adenovirus and Metapneumovirus is routinely performed for [facility] pediatric oncology and intensive care inpatients, and is available on other patients by placing an add-on request.     CT abdomen pelvis wo IV contrast   Final Result   1.  Moderate to severe amount of fat stranding and inflammatory   changes in the retroperitoneum surrounding the pancreas and duodenum.   Findings may represent pancreatitis and/or duodenitis. Clinical and   laboratory correlation and follow-up is recommended.             MACRO:   None        Signed by: Yoan Starr 12/21/2023 4:32 PM   Dictation workstation:   [id number]          Diagnoses as of 12/21/23 2213   Acute pancreatitis, unspecified complication status, unspecified pancreatitis type   Leukocytosis, unspecified type       Medical Decision Making  Patient has remained calm and comfortable.  He did complain of some increased pain and nausea and was given additional dose of morphine and Zofran.  He is not septic or toxic.  Continues pending transfer.  Final diagnoses:   [K85.90] Acute pancreatitis, unspecified complication status, unspecified pancreatitis type   [D72.829] Leukocytosis, unspecified type           Procedure  Procedures    MD Ramon Regalado MD

## 2023-12-22 NOTE — H&P
Patient: Luis Bucio Age: 63 y.o.   Gender: male Room/bed: 143/143-A     Attending: Marcelo Alexander DO  Code Status:  Full Code    Chief Complaint   Patient: Luis Bucio is a 63 y.o. male who presented to the hospital for abdominal pain 2/2 acute pancreatitis    HPI   Luis Bucio is a 63 y.o. male with PMH of CAD (coronary bypass 2022) who presented to the hospital for abdominal pain 2/2 acute pancreatitis.  The patient started having mild chest pain Tuesday 12/19 and was worried he was having heart issues however the next day after 3 hours after he ate week old macoroni and cheese he started having nausea/vomiting, diarrhea, fever and chills.  At this time his pain was located more in his epigastric region and radiated down to his suprapubic area.  He was going to come to the hospital but he states that the pain was too severe.  He decided to come to the hospital the next day when his symptoms began to feel better.    States he feels like he has had abdominal distention for months. He does not consume alcohol. He has not had appetite to eat since Wednesday when he started vomiting.  He rates his pain as 5/10 and characterizes it as an ache. Currently no fever, or chills.  Currently locates his pain to the epigastric region which radiates laterally with no suprapubic pain.  States his pain comes and goes.  Negative for headache, vision changes, chest pain, SOB, constipation, diarrhea, dysuria.    ED Course   Vitals - /75   Pulse 110   Temp 36.4 °C (97.5 °F) (Temporal)   Resp 18   Wt 110 kg (241 lb 13.5 oz)   SpO2 95%     Labs:   Results from last 72 hours   Lab Units 12/21/23  1552   SODIUM mmol/L 138   POTASSIUM mmol/L 4.0   CHLORIDE mmol/L 102   CO2 mmol/L 26   BUN mg/dL 22   CREATININE mg/dL 1.21   GLUCOSE mg/dL 122*   CALCIUM mg/dL 9.3   ANION GAP mmol/L 14   EGFR mL/min/1.73m*2 68      Results from last 72 hours   Lab Units 12/21/23  1552   WBC AUTO x10*3/uL 20.7*   HEMOGLOBIN g/dL 15.5  "  HEMATOCRIT % 44.2   PLATELETS AUTO x10*3/uL 266   NEUTROS PCT AUTO % 85.4   LYMPHS PCT AUTO % 9.7   MONOS PCT AUTO % 4.3   EOS PCT AUTO % 0.0      Lab Results   Component Value Date    CALCIUM 9.3 12/21/2023    PHOS 3.9 07/28/2022      No results found for: \"CRP\"   [unfilled]     Micro/ID:   No results found for the last 90 days.                   No lab exists for component: \"AGALPCRNB\"   .ID  No results found for: \"URINECULTURE\", \"BLOODCULT\", \"CSFCULTSMEAR\"    Imaging:   No orders to display     Encounter Date: 12/17/23   ECG 12 Lead   Result Value    Ventricular Rate 62    Atrial Rate 62    KS Interval 160    QRS Duration 92    QT Interval 440    QTC Calculation(Bazett) 446    P Axis 30    R Axis -23    T Axis 135    QRS Count 10    Q Onset 210    P Onset 130    P Offset 193    T Offset 430    QTC Fredericia 445    Narrative    Normal sinus rhythm  Left ventricular hypertrophy with repolarization abnormality  Abnormal ECG  When compared with ECG of 10-MARGO-2023 22:39,  Previous ECG has undetermined rhythm, needs review  T wave inversion more evident in Lateral leads     No echocardiogram results found for the past 12 months  CT abdomen pelvis wo IV contrast    Result Date: 12/21/2023  Interpreted By:  Yoan Starr, STUDY: CT ABDOMEN PELVIS WO IV CONTRAST;  12/21/2023 4:02 pm   INDICATION: Signs/Symptoms:Abdominal pain.   COMPARISON: 05/22/2023   ACCESSION NUMBER(S): [id number]   ORDERING CLINICIAN: MARGARITA GUERRERO   TECHNIQUE: CT of the abdomen and pelvis was performed. Contiguous axial images were obtained at 3 mm slice thickness through the abdomen and pelvis. Coronal and sagittal reconstructions at 3 mm slice thickness were performed.  No intravenous contrast was administered; positive oral contrast was given.   FINDINGS: Please note that the evaluation of vessels, lymph nodes and organs is limited without intravenous contrast.   LOWER CHEST: The lung bases are grossly clear. Hyperinflation. Tiny " hiatal hernia.   ABDOMEN:   LIVER: Grossly unremarkable. No definite focal liver lesions.   BILE DUCTS: No bile duct dilatation.   GALLBLADDER: Suboptimally distended gallbladder. It is suboptimally evaluated.   PANCREAS: There is peripancreatic fat stranding and inflammatory changes. Homogeneous pancreas otherwise.   SPLEEN: Unremarkable spleen.   ADRENAL GLANDS: No adrenal masses.   KIDNEYS AND URETERS: No evidence of hydronephrosis. No definite renal masses or stones. No hydroureter.   PELVIS:   BLADDER: Suboptimally distended and evaluated.   REPRODUCTIVE ORGANS: No definite masses.   BOWEL: There is fat stranding and inflammatory changes surrounding the transverse portion of the duodenum. No evidence of bowel obstruction. Diverticulosis without definite diverticulitis.       VESSELS: Aortic calcification. No aneurysmal dilatation.   PERITONEUM/RETROPERITONEUM/LYMPH NODES: There is retroperitoneal fat stranding and fluid.   ABDOMINAL WALL: Small fat containing umbilical hernia.   BONES: Discogenic degenerative changes.       1.  Moderate to severe amount of fat stranding and inflammatory changes in the retroperitoneum surrounding the pancreas and duodenum. Findings may represent pancreatitis and/or duodenitis. Clinical and laboratory correlation and follow-up is recommended.     MACRO: None   Signed by: Yoan Starr 12/21/2023 4:32 PM Dictation workstation:   [id number]      Interventions:  Medications   enoxaparin (Lovenox) syringe 40 mg (40 mg subcutaneous Not Given 12/22/23 0300)   aspirin EC tablet 81 mg (has no administration in time range)   metoprolol tartrate (Lopressor) tablet 25 mg (has no administration in time range)   acetaminophen (Tylenol) tablet 650 mg (has no administration in time range)   oxyCODONE (Roxicodone) immediate release tablet 5 mg (has no administration in time range)   oxyCODONE (Roxicodone) immediate release tablet 10 mg (has no administration in time range)   HYDROmorphone  (Dilaudid) injection 0.2 mg (has no administration in time range)   lactated Ringer's infusion (has no administration in time range)   piperacillin-tazobactam-dextrose (Zosyn) IV 3.375 g (has no administration in time range)   atorvastatin (Lipitor) tablet 80 mg (has no administration in time range)   clopidogrel (Plavix) tablet 75 mg (has no administration in time range)   traZODone (Desyrel) tablet 50 mg (has no administration in time range)   amLODIPine (Norvasc) tablet 5 mg ( oral Dose Auto Held 12/26/23 0900)   pantoprazole (ProtoNix) injection 40 mg (has no administration in time range)   furosemide (Lasix) tablet 20 mg ( oral Dose Auto Held 12/26/23 0900)     Past Medical History     Past Medical History:   Diagnosis Date    Hypertension     Other specified health status     No known problems        Surgical History     Past Surgical History:   Procedure Laterality Date    OTHER SURGICAL HISTORY  09/06/2016    Leg Incision And Drainage       Family History   No family history on file.    Social History     Social History     Socioeconomic History    Marital status:      Spouse name: Not on file    Number of children: Not on file    Years of education: Not on file    Highest education level: Not on file   Occupational History    Not on file   Tobacco Use    Smoking status: Former     Types: Cigarettes    Smokeless tobacco: Never   Vaping Use    Vaping Use: Never used   Substance and Sexual Activity    Alcohol use: Not Currently    Drug use: Yes     Types: Marijuana     Comment: weekly    Sexual activity: Not on file   Other Topics Concern    Not on file   Social History Narrative    Not on file     Social Determinants of Health     Financial Resource Strain: Low Risk  (12/22/2023)    Overall Financial Resource Strain (CARDIA)     Difficulty of Paying Living Expenses: Not very hard   Food Insecurity: Not on file   Transportation Needs: No Transportation Needs (12/22/2023)    PRAPARE - Transportation      "Lack of Transportation (Medical): No     Lack of Transportation (Non-Medical): No   Physical Activity: Not on file   Stress: Not on file   Social Connections: Not on file   Intimate Partner Violence: Not on file   Housing Stability: Low Risk  (12/22/2023)    Housing Stability Vital Sign     Unable to Pay for Housing in the Last Year: No     Number of Places Lived in the Last Year: 1     Unstable Housing in the Last Year: No       Tobacco Use: Medium Risk (12/22/2023)    Patient History     Smoking Tobacco Use: Former     Smokeless Tobacco Use: Never     Passive Exposure: Not on file        Social History     Substance and Sexual Activity   Alcohol Use Not Currently        Allergies   No Known Allergies     Objective    Physical Exam   Constitutional: Appears stated age. In NAD.   HEENT: NC/AT, EOMI, clear sclera, moist mucous membranes  CV: RRR, systolic murmur  PULM: CTAB, no coughing or wheezing  ABDOMEN: Firm, distended. TTP epigastrium. + BSx4.  SKIN: Normal Color, Warm, Dry, No Rashes   EXTREMITIES: Non-Tender, Full ROM, 1+ Pedal Edema  NEURO: A&O x 4, nonfocal neurological exam.  PSYCH: Normal Mood & Behavior     Visit Vitals  /75   Pulse 110   Temp 36.4 °C (97.5 °F) (Temporal)   Resp 18   Ht 1.803 m (5' 11\")   Wt 110 kg (241 lb 13.5 oz)   SpO2 95%   BMI 33.73 kg/m²   Smoking Status Former   BSA 2.35 m²        No intake or output data in the 24 hours ending 12/22/23 0409          Meds    Scheduled medications  [Held by provider] amLODIPine, 5 mg, oral, Daily  aspirin, 81 mg, oral, Daily  atorvastatin, 80 mg, oral, Nightly  clopidogrel, 75 mg, oral, Daily  enoxaparin, 40 mg, subcutaneous, q24h  [Held by provider] furosemide, 20 mg, oral, Daily  metoprolol tartrate, 25 mg, oral, BID  pantoprazole, 40 mg, intravenous, Daily  piperacillin-tazobactam, 3.375 g, intravenous, q6h  traZODone, 50 mg, oral, Nightly        Continuous medications  lactated Ringer's, 50 mL/hr        PRN medications       Assessment " and Plan    Luis Bucio is a 63 y.o. male with PMH of CAD (coronary bypass 2022) who presented to the hospital for abdominal pain 2/2 acute pancreatitis.      Acute medical issues  #Acute pancreatitis,  #Duodenitis  -continue zosyn, start Protonix  -Pain management tylenol, oxycodone, dilaudid for breakthough pain.  -Maintenance fluid  -Lipid panel  -NPO, advance diet as tolerated  -RUQ for biliary evaluation  -Urine ETOH level      Chronic medical issues  #CAD  -Asprin, metoprolol, Lipitor, Plavix  -Furosemide and amlodipine held  -Social work consulted 2/2 inability to pay for medication    #Insomnia  -Trazodone    Fluids: IVF 50cc/hr  Electrolytes: Replete as needed   Nutrition: NPO, advance as tolerated  GI Ppx: Protonix  DVT Ppx: Lovenox    Oxygenation: None  Antibiotics: None    Dispo: Luis Bucio is a 63 y.o. male with PMH of CAD (coronary bypass 2022) who presented to the hospital for abdominal pain 2/2 acute pancreatitis.  IVF and pain regimen. ELOS >48hrs    Hans Smith, DO

## 2023-12-22 NOTE — CARE PLAN
The patient's goals for the shift include pain control    The clinical goals for the shift include Pain control and hemodynamically stable      Problem: Pain - Adult  Goal: Verbalizes/displays adequate comfort level or baseline comfort level  Outcome: Progressing     Problem: Safety - Adult  Goal: Free from fall injury  Outcome: Progressing

## 2023-12-22 NOTE — CARE PLAN
The patient's goals for the shift include pain control    The clinical goals for the shift include pain control and pt will remai hemodynamically stable

## 2023-12-22 NOTE — PROGRESS NOTES
"Luis Bucio is a 63 y.o. male on day 0 of admission presenting with Acute pancreatitis, unspecified complication status, unspecified pancreatitis type.    Subjective     Patient doing well this morning, states he feels his pain is more under control. Rates his abdominal pain as 2/10 in severity. Only reports some moderate nausea, but denies any vomiting, chest pain, shortness of breath, palpitations, headache or vision changes. No diarrhea.     Objective     Physical Exam  Constitutional:       Appearance: Normal appearance.   HENT:      Head: Atraumatic.   Eyes:      Extraocular Movements: Extraocular movements intact.      Conjunctiva/sclera: Conjunctivae normal.   Cardiovascular:      Rate and Rhythm: Normal rate and regular rhythm.      Heart sounds: No murmur heard.     No friction rub. No gallop.   Pulmonary:      Effort: Pulmonary effort is normal. No respiratory distress.      Breath sounds: Normal breath sounds. No wheezing, rhonchi or rales.   Abdominal:      General: There is distension.      Palpations: Abdomen is soft.      Tenderness: There is abdominal tenderness (mild RUQ tenderness). There is no guarding or rebound.   Musculoskeletal:         General: No swelling.      Right lower leg: No edema.      Left lower leg: No edema.   Skin:     General: Skin is warm and dry.   Neurological:      Mental Status: He is alert and oriented to person, place, and time. Mental status is at baseline.         Last Recorded Vitals  Blood pressure (!) 141/98, pulse (!) 114, temperature 36.4 °C (97.5 °F), temperature source Tympanic, resp. rate 18, height 1.803 m (5' 11\"), weight 110 kg (241 lb 13.5 oz), SpO2 93 %.  Intake/Output last 3 Shifts:  I/O last 3 completed shifts:  In: 73.3 (0.7 mL/kg) [I.V.:73.3 (0.7 mL/kg)]  Out: - (0 mL/kg)   Weight: 109.7 kg     Relevant Results    Scheduled medications  [Held by provider] amLODIPine, 5 mg, oral, Daily  aspirin, 81 mg, oral, Daily  atorvastatin, 80 mg, oral, " Nightly  clopidogrel, 75 mg, oral, Daily  enoxaparin, 40 mg, subcutaneous, q24h  [Held by provider] furosemide, 20 mg, oral, Daily  metoprolol tartrate, 25 mg, oral, BID  pantoprazole, 40 mg, intravenous, Daily  traZODone, 50 mg, oral, Nightly      Continuous medications  lactated Ringer's, 250 mL/hr, Last Rate: 250 mL/hr (12/22/23 1147)      PRN medications  PRN medications: acetaminophen, HYDROmorphone, ondansetron, oxyCODONE, oxyCODONE   Results for orders placed or performed during the hospital encounter of 12/22/23 (from the past 24 hour(s))   Ethanol   Result Value Ref Range    Alcohol <10 <=10 mg/dL   Lipid panel   Result Value Ref Range    Cholesterol 205 (H) 0 - 199 mg/dL    HDL-Cholesterol 24.6 mg/dL    Cholesterol/HDL Ratio 8.3     LDL Calculated 157 (H) <=99 mg/dL    VLDL 24 0 - 40 mg/dL    Triglycerides 119 0 - 149 mg/dL    Non HDL Cholesterol 180 (H) 0 - 149 mg/dL   Comprehensive metabolic panel   Result Value Ref Range    Glucose 130 (H) 74 - 99 mg/dL    Sodium 138 136 - 145 mmol/L    Potassium 4.2 3.5 - 5.3 mmol/L    Chloride 105 98 - 107 mmol/L    Bicarbonate 25 21 - 32 mmol/L    Anion Gap 12 10 - 20 mmol/L    Urea Nitrogen 22 6 - 23 mg/dL    Creatinine 1.62 (H) 0.50 - 1.30 mg/dL    eGFR 48 (L) >60 mL/min/1.73m*2    Calcium 8.0 (L) 8.6 - 10.3 mg/dL    Albumin 3.6 3.4 - 5.0 g/dL    Alkaline Phosphatase 71 33 - 136 U/L    Total Protein 6.5 6.4 - 8.2 g/dL    AST 31 9 - 39 U/L    Bilirubin, Total 2.0 (H) 0.0 - 1.2 mg/dL    ALT 58 (H) 10 - 52 U/L   CBC   Result Value Ref Range    WBC 20.1 (H) 4.4 - 11.3 x10*3/uL    nRBC 0.0 0.0 - 0.0 /100 WBCs    RBC 3.48 (L) 4.50 - 5.90 x10*6/uL    Hemoglobin 13.3 (L) 13.5 - 17.5 g/dL    Hematocrit 39.3 (L) 41.0 - 52.0 %     (H) 80 - 100 fL    MCH 38.2 (H) 26.0 - 34.0 pg    MCHC 33.8 32.0 - 36.0 g/dL    RDW 13.7 11.5 - 14.5 %    Platelets 218 150 - 450 x10*3/uL     US right upper quadrant    Result Date: 12/22/2023  Interpreted By:  Jamshid Herrera, STUDY: US  RIGHT UPPER QUADRANT;  12/22/2023 8:42 am   INDICATION: Signs/Symptoms:acute pancreaatitis.   COMPARISON: CT abdomen/pelvis of 12/21/2023.   ACCESSION NUMBER(S): [id number]   ORDERING CLINICIAN: DENI SEBASTIAN   TECHNIQUE: Real-time sonographic evaluation of the right upper quadrant was performed.   FINDINGS: LIVER: There is nonspecific mild coarsened appearance of the hepatic parenchyma. No discrete hepatic lesion is demonstrated. Liver measures approximately 17 cm in sagittal dimension.   GALLBLADDER: Gallbladder is contracted limiting evaluation. Some hyperechoic material within the dependent portion of the gallbladder may represent combination of sludge and small stones. Sonographic Rangel sign is negative.   BILIARY TREE: Visualized portion of the common bile duct is not dilated measuring 4 mm in diameter. Distal common bile duct obscured by bowel gas.   PANCREAS: The pancreas is poorly visualized due to overlying bowel gas.   RIGHT KIDNEY: Right kidney measures  9.3cm in length.  No right hydronephrosis is seen.       Contracted gallbladder containing hyperechoic sludge and/or stones in the dependent portion. Negative sonographic Rangel's sign. No biliary dilation.   No focal hepatic lesion demonstrated.   Sub visualization of the pancreas.   MACRO: None.   Signed by: Jamshid Herrera 12/22/2023 8:55 AM Dictation workstation:   [id number]    CT abdomen pelvis wo IV contrast    Result Date: 12/21/2023  Interpreted By:  Yoan Starr, STUDY: CT ABDOMEN PELVIS WO IV CONTRAST;  12/21/2023 4:02 pm   INDICATION: Signs/Symptoms:Abdominal pain.   COMPARISON: 05/22/2023   ACCESSION NUMBER(S): [id number]   ORDERING CLINICIAN: MARGARITA GUERRERO   TECHNIQUE: CT of the abdomen and pelvis was performed. Contiguous axial images were obtained at 3 mm slice thickness through the abdomen and pelvis. Coronal and sagittal reconstructions at 3 mm slice thickness were performed.  No intravenous contrast was administered;  positive oral contrast was given.   FINDINGS: Please note that the evaluation of vessels, lymph nodes and organs is limited without intravenous contrast.   LOWER CHEST: The lung bases are grossly clear. Hyperinflation. Tiny hiatal hernia.   ABDOMEN:   LIVER: Grossly unremarkable. No definite focal liver lesions.   BILE DUCTS: No bile duct dilatation.   GALLBLADDER: Suboptimally distended gallbladder. It is suboptimally evaluated.   PANCREAS: There is peripancreatic fat stranding and inflammatory changes. Homogeneous pancreas otherwise.   SPLEEN: Unremarkable spleen.   ADRENAL GLANDS: No adrenal masses.   KIDNEYS AND URETERS: No evidence of hydronephrosis. No definite renal masses or stones. No hydroureter.   PELVIS:   BLADDER: Suboptimally distended and evaluated.   REPRODUCTIVE ORGANS: No definite masses.   BOWEL: There is fat stranding and inflammatory changes surrounding the transverse portion of the duodenum. No evidence of bowel obstruction. Diverticulosis without definite diverticulitis.       VESSELS: Aortic calcification. No aneurysmal dilatation.   PERITONEUM/RETROPERITONEUM/LYMPH NODES: There is retroperitoneal fat stranding and fluid.   ABDOMINAL WALL: Small fat containing umbilical hernia.   BONES: Discogenic degenerative changes.       1.  Moderate to severe amount of fat stranding and inflammatory changes in the retroperitoneum surrounding the pancreas and duodenum. Findings may represent pancreatitis and/or duodenitis. Clinical and laboratory correlation and follow-up is recommended.     MACRO: None   Signed by: Yoan Starr 12/21/2023 4:32 PM Dictation workstation:   [id number]    ECG 12 Lead    Result Date: 12/18/2023  Normal sinus rhythm Left ventricular hypertrophy with repolarization abnormality Abnormal ECG When compared with ECG of 10-MARGO-2023 22:39, Previous ECG has undetermined rhythm, needs review T wave inversion more evident in Lateral leads    XR chest 1 view    Result Date:  12/17/2023  Interpreted By:  Rajiv Alexander, STUDY: Chest dated  12/17/2023.   INDICATION: Signs/Symptoms:chest pain   COMPARISON: Chest dated 06/10/2020.   ACCESSION NUMBER(S): [id number]   ORDERING CLINICIAN: DARIAN FUNES   TECHNIQUE: One view of the chest.   FINDINGS: The lungs are clear.  No pneumothorax or effusion is evident. The cardiomediastinal silhouette is  not enlarged.Surgical changes are seen of the mediastinum.Degenerative change is seen of the spine and shoulders.       No acute cardiopulmonary process is evident.   MACRO: None   Signed by: Rajiv Alexander 12/17/2023 11:01 AM Dictation workstation:   [id number]       Assessment/Plan   Principal Problem:    Acute pancreatitis, unspecified complication status, unspecified pancreatitis type    Luis Bucio is a 63 y.o. male with PMH of CAD (coronary bypass 2022) who presented to the hospital for abdominal pain 2/2 acute pancreatitis.        Acute medical issues  #Acute pancreatitis,  -Zosyn discontinued  - IV Fluids   -Pain management tylenol, oxycodone, dilaudid for breakthough pain.  -Lipid panel results demonstrate no evidence for etiology   -NPO, advance diet as tolerated  -RUQ demonstrated contracted gallbladder containing hyperechoci sludge and/or tones in the dependent portion. Negative sonographic Rangel's sign. No biliary dilation.   -Urine ETOH level negative         Chronic medical issues  #CAD  -Asprin, metoprolol, Lipitor, Plavix  -Furosemide and amlodipine held  -Social work consulted 2/2 inability to pay for medication     #Insomnia  -Trazodone     Fluids: IVF 250cc/hr  Electrolytes: Replete as needed   Nutrition: NPO, advance as tolerated  GI Ppx: Protonix  DVT Ppx: Lovenox     Oxygenation: None  Antibiotics: None     Dispo: Luis Bucio is a 63 y.o. male with PMH of CAD (coronary bypass 2022) who presented to the hospital for abdominal pain 2/2 acute pancreatitis.  IVF and pain regimen. ELOS >48hrs        Tomás Warren  MD  Transitional Year, PGY-1

## 2023-12-22 NOTE — PROGRESS NOTES
12/22/23 1119   Discharge Planning   Living Arrangements Alone   Support Systems Children   Assistance Needed Patient is A&O X3, on room air at baseline, is independent with ADLs and uses no DME at home. Patient does not drive and voices some difficulty with transportation to appointments. LSW consulted.   Type of Residence Private residence   Do you have animals or pets at home? No   Who is requesting discharge planning? Provider   Home or Post Acute Services None   Patient expects to be discharged to: Home no needs   Does the patient need discharge transport arranged? No   Financial Resource Strain   How hard is it for you to pay for the very basics like food, housing, medical care, and heating? Not hard   Housing Stability   In the last 12 months, was there a time when you were not able to pay the mortgage or rent on time? N   In the last 12 months, how many places have you lived? 1   In the last 12 months, was there a time when you did not have a steady place to sleep or slept in a shelter (including now)? N   Transportation Needs   In the past 12 months, has lack of transportation kept you from medical appointments or from getting medications? yes   In the past 12 months, has lack of transportation kept you from meetings, work, or from getting things needed for daily living? Yes   Patient Choice   Provider Choice list and CMS website (https://medicare.gov/care-compare#search) for post-acute Quality and Resource Measure Data were provided and reviewed with: Patient   Patient / Family choosing to utilize agency / facility established prior to hospitalization No

## 2023-12-22 NOTE — CARE PLAN
12/22/23:  SW consulted to speak with pt regarding financial concerns and transportation.  Per pt he has been using transport provided by insurance company and his son drives him when able too.  Pt does have difficulty with finding same day transport.  Pt also states that he has been taking 2 of his meds and should be taking 10 or 11.  SW requested that pharmacy be consulted for medication assistance.  SW will provide additional resources for pt.

## 2023-12-23 LAB
ALBUMIN SERPL BCP-MCNC: 3.3 G/DL (ref 3.4–5)
ANION GAP SERPL CALC-SCNC: 14 MMOL/L (ref 10–20)
BUN SERPL-MCNC: 16 MG/DL (ref 6–23)
CALCIUM SERPL-MCNC: 8 MG/DL (ref 8.6–10.3)
CHLORIDE SERPL-SCNC: 106 MMOL/L (ref 98–107)
CO2 SERPL-SCNC: 24 MMOL/L (ref 21–32)
CREAT SERPL-MCNC: 1.26 MG/DL (ref 0.5–1.3)
ERYTHROCYTE [DISTWIDTH] IN BLOOD BY AUTOMATED COUNT: 13.9 % (ref 11.5–14.5)
GFR SERPL CREATININE-BSD FRML MDRD: 64 ML/MIN/1.73M*2
GLUCOSE SERPL-MCNC: 105 MG/DL (ref 74–99)
HCT VFR BLD AUTO: 35 % (ref 41–52)
HGB BLD-MCNC: 11.7 G/DL (ref 13.5–17.5)
MAGNESIUM SERPL-MCNC: 1.94 MG/DL (ref 1.6–2.4)
MCH RBC QN AUTO: 38.4 PG (ref 26–34)
MCHC RBC AUTO-ENTMCNC: 33.4 G/DL (ref 32–36)
MCV RBC AUTO: 115 FL (ref 80–100)
NRBC BLD-RTO: 0.1 /100 WBCS (ref 0–0)
PHOSPHATE SERPL-MCNC: 1.8 MG/DL (ref 2.5–4.9)
PLATELET # BLD AUTO: 192 X10*3/UL (ref 150–450)
POTASSIUM SERPL-SCNC: 3.7 MMOL/L (ref 3.5–5.3)
RBC # BLD AUTO: 3.05 X10*6/UL (ref 4.5–5.9)
SODIUM SERPL-SCNC: 140 MMOL/L (ref 136–145)
WBC # BLD AUTO: 19.6 X10*3/UL (ref 4.4–11.3)

## 2023-12-23 PROCEDURE — 85027 COMPLETE CBC AUTOMATED: CPT

## 2023-12-23 PROCEDURE — C9113 INJ PANTOPRAZOLE SODIUM, VIA: HCPCS

## 2023-12-23 PROCEDURE — 2500000004 HC RX 250 GENERAL PHARMACY W/ HCPCS (ALT 636 FOR OP/ED)

## 2023-12-23 PROCEDURE — 1100000001 HC PRIVATE ROOM DAILY

## 2023-12-23 PROCEDURE — 2500000001 HC RX 250 WO HCPCS SELF ADMINISTERED DRUGS (ALT 637 FOR MEDICARE OP)

## 2023-12-23 PROCEDURE — 83735 ASSAY OF MAGNESIUM: CPT

## 2023-12-23 PROCEDURE — 80069 RENAL FUNCTION PANEL: CPT

## 2023-12-23 PROCEDURE — 96372 THER/PROPH/DIAG INJ SC/IM: CPT

## 2023-12-23 PROCEDURE — 36415 COLL VENOUS BLD VENIPUNCTURE: CPT

## 2023-12-23 PROCEDURE — 99232 SBSQ HOSP IP/OBS MODERATE 35: CPT | Performed by: STUDENT IN AN ORGANIZED HEALTH CARE EDUCATION/TRAINING PROGRAM

## 2023-12-23 RX ORDER — GUAIFENESIN 600 MG/1
600 TABLET, EXTENDED RELEASE ORAL 2 TIMES DAILY PRN
Status: DISCONTINUED | OUTPATIENT
Start: 2023-12-23 | End: 2023-12-25 | Stop reason: HOSPADM

## 2023-12-23 RX ORDER — POLYETHYLENE GLYCOL 3350 17 G/17G
17 POWDER, FOR SOLUTION ORAL DAILY
Status: DISCONTINUED | OUTPATIENT
Start: 2023-12-23 | End: 2023-12-25 | Stop reason: HOSPADM

## 2023-12-23 RX ADMIN — ATORVASTATIN CALCIUM 80 MG: 80 TABLET, FILM COATED ORAL at 20:22

## 2023-12-23 RX ADMIN — POLYETHYLENE GLYCOL 3350 17 G: 17 POWDER, FOR SOLUTION ORAL at 10:03

## 2023-12-23 RX ADMIN — CLOPIDOGREL 75 MG: 75 TABLET ORAL at 09:06

## 2023-12-23 RX ADMIN — GUAIFENESIN 600 MG: 600 TABLET ORAL at 10:46

## 2023-12-23 RX ADMIN — TRAZODONE HYDROCHLORIDE 50 MG: 50 TABLET ORAL at 21:39

## 2023-12-23 RX ADMIN — METOPROLOL TARTRATE 25 MG: 25 TABLET, FILM COATED ORAL at 09:06

## 2023-12-23 RX ADMIN — OXYCODONE HYDROCHLORIDE 5 MG: 5 TABLET ORAL at 06:34

## 2023-12-23 RX ADMIN — METOPROLOL TARTRATE 25 MG: 25 TABLET, FILM COATED ORAL at 20:22

## 2023-12-23 RX ADMIN — ENOXAPARIN SODIUM 40 MG: 40 INJECTION SUBCUTANEOUS at 09:06

## 2023-12-23 RX ADMIN — SODIUM CHLORIDE, POTASSIUM CHLORIDE, SODIUM LACTATE AND CALCIUM CHLORIDE 150 ML/HR: 600; 310; 30; 20 INJECTION, SOLUTION INTRAVENOUS at 21:39

## 2023-12-23 RX ADMIN — GUAIFENESIN 600 MG: 600 TABLET ORAL at 20:22

## 2023-12-23 RX ADMIN — SODIUM CHLORIDE, POTASSIUM CHLORIDE, SODIUM LACTATE AND CALCIUM CHLORIDE 150 ML/HR: 600; 310; 30; 20 INJECTION, SOLUTION INTRAVENOUS at 15:02

## 2023-12-23 RX ADMIN — ASPIRIN 81 MG: 81 TABLET, COATED ORAL at 09:06

## 2023-12-23 RX ADMIN — OXYCODONE HYDROCHLORIDE 5 MG: 5 TABLET ORAL at 21:38

## 2023-12-23 RX ADMIN — SODIUM CHLORIDE, POTASSIUM CHLORIDE, SODIUM LACTATE AND CALCIUM CHLORIDE 250 ML/HR: 600; 310; 30; 20 INJECTION, SOLUTION INTRAVENOUS at 09:06

## 2023-12-23 RX ADMIN — PANTOPRAZOLE SODIUM 40 MG: 40 INJECTION, POWDER, FOR SOLUTION INTRAVENOUS at 09:06

## 2023-12-23 RX ADMIN — OXYCODONE HYDROCHLORIDE 5 MG: 5 TABLET ORAL at 15:02

## 2023-12-23 ASSESSMENT — COGNITIVE AND FUNCTIONAL STATUS - GENERAL
DAILY ACTIVITIY SCORE: 24
DAILY ACTIVITIY SCORE: 24
MOBILITY SCORE: 24
MOBILITY SCORE: 24

## 2023-12-23 ASSESSMENT — PAIN SCALES - GENERAL
PAINLEVEL_OUTOF10: 5 - MODERATE PAIN
PAINLEVEL_OUTOF10: 4
PAINLEVEL_OUTOF10: 4
PAINLEVEL_OUTOF10: 3
PAINLEVEL_OUTOF10: 3
PAINLEVEL_OUTOF10: 2

## 2023-12-23 ASSESSMENT — PAIN DESCRIPTION - LOCATION
LOCATION: ABDOMEN
LOCATION: ABDOMEN

## 2023-12-23 ASSESSMENT — PAIN - FUNCTIONAL ASSESSMENT
PAIN_FUNCTIONAL_ASSESSMENT: 0-10
PAIN_FUNCTIONAL_ASSESSMENT: 0-10

## 2023-12-23 NOTE — PROGRESS NOTES
Luis Bucio is a 63 y.o. male on day 1 of admission presenting with Acute pancreatitis, unspecified complication status, unspecified pancreatitis type.      Subjective   This morning, pain controlled with medications. Has no complaints except constipation.          Objective     Last Recorded Vitals  BP (!) 147/97 (BP Location: Right arm, Patient Position: Lying)   Pulse 106   Temp 36.6 °C (97.9 °F) (Temporal)   Resp 18   Wt 110 kg (241 lb 13.5 oz)   SpO2 92%   Intake/Output last 3 Shifts:    Intake/Output Summary (Last 24 hours) at 12/23/2023 1234  Last data filed at 12/23/2023 0910  Gross per 24 hour   Intake 3797.5 ml   Output 0 ml   Net 3797.5 ml       Admission Weight  Weight: 110 kg (241 lb 13.5 oz) (12/22/23 0216)    Daily Weight  12/22/23 : 110 kg (241 lb 13.5 oz)    Image Results  US right upper quadrant  Narrative: Interpreted By:  Jamshid Herrera,   STUDY:  US RIGHT UPPER QUADRANT;  12/22/2023 8:42 am      INDICATION:  Signs/Symptoms:acute pancreaatitis.      COMPARISON:  CT abdomen/pelvis of 12/21/2023.      ACCESSION NUMBER(S):  [id number]      ORDERING CLINICIAN:  DENI SEBASTIAN      TECHNIQUE:  Real-time sonographic evaluation of the right upper quadrant was  performed.      FINDINGS:  LIVER:  There is nonspecific mild coarsened appearance of the hepatic  parenchyma. No discrete hepatic lesion is demonstrated. Liver  measures approximately 17 cm in sagittal dimension.      GALLBLADDER:  Gallbladder is contracted limiting evaluation. Some hyperechoic  material within the dependent portion of the gallbladder may  represent combination of sludge and small stones. Sonographic Rangel  sign is negative.      BILIARY TREE:  Visualized portion of the common bile duct is not dilated measuring 4  mm in diameter. Distal common bile duct obscured by bowel gas.      PANCREAS:  The pancreas is poorly visualized due to overlying bowel gas.      RIGHT KIDNEY:  Right kidney measures  9.3cm in length.  No  right hydronephrosis is  seen.      Impression: Contracted gallbladder containing hyperechoic sludge and/or stones in  the dependent portion. Negative sonographic Rangel's sign. No biliary  dilation.      No focal hepatic lesion demonstrated.      Sub visualization of the pancreas.      MACRO:  None.      Signed by: Jamshid Herrera 12/22/2023 8:55 AM  Dictation workstation:   [id number]      Physical Exam  Constitutional:       General: He is not in acute distress.  HENT:      Head: Normocephalic and atraumatic.   Eyes:      Extraocular Movements: Extraocular movements intact.      Conjunctiva/sclera: Conjunctivae normal.   Cardiovascular:      Rate and Rhythm: Normal rate and regular rhythm.      Heart sounds: No murmur heard.     No friction rub. No gallop.   Pulmonary:      Effort: Pulmonary effort is normal.      Breath sounds: Normal breath sounds. No wheezing, rhonchi or rales.   Abdominal:      General: Bowel sounds are normal. There is distension.      Palpations: Abdomen is soft. There is no mass.      Tenderness: There is abdominal tenderness. There is no guarding or rebound.      Comments: Diffuse abd ttp that is mild   Abd distended but soft    Musculoskeletal:         General: No tenderness.      Cervical back: Neck supple.      Right lower leg: No edema.      Left lower leg: No edema.   Skin:     General: Skin is warm and dry.      Findings: No bruising or rash.   Neurological:      Mental Status: He is alert. Mental status is at baseline.      Comments: Answering questions, following commands. Moving all extremities.    Psychiatric:         Mood and Affect: Mood and affect normal.         Relevant Results               Scheduled medications  [Held by provider] amLODIPine, 5 mg, oral, Daily  aspirin, 81 mg, oral, Daily  atorvastatin, 80 mg, oral, Nightly  clopidogrel, 75 mg, oral, Daily  enoxaparin, 40 mg, subcutaneous, q24h  [Held by provider] furosemide, 20 mg, oral, Daily  metoprolol tartrate, 25  mg, oral, BID  pantoprazole, 40 mg, intravenous, Daily  polyethylene glycol, 17 g, oral, Daily  traZODone, 50 mg, oral, Nightly      Continuous medications  lactated Ringer's, 150 mL/hr, Last Rate: 150 mL/hr (12/23/23 0946)      PRN medications  PRN medications: acetaminophen, guaiFENesin, HYDROmorphone, ondansetron, oxyCODONE, oxyCODONE    Results for orders placed or performed during the hospital encounter of 12/22/23 (from the past 24 hour(s))   Urinalysis with Reflex Microscopic   Result Value Ref Range    Color, Urine Yellow Straw, Yellow    Appearance, Urine Hazy (N) Clear    Specific Gravity, Urine 1.016 1.005 - 1.035    pH, Urine 6.0 5.0, 5.5, 6.0, 6.5, 7.0, 7.5, 8.0    Protein, Urine 100 (2+) (N) NEGATIVE mg/dL    Glucose, Urine NEGATIVE NEGATIVE mg/dL    Blood, Urine SMALL (1+) (A) NEGATIVE    Ketones, Urine 20 (1+) (A) NEGATIVE mg/dL    Bilirubin, Urine NEGATIVE NEGATIVE    Urobilinogen, Urine 2.0 (N) <2.0 mg/dL    Nitrite, Urine NEGATIVE NEGATIVE    Leukocyte Esterase, Urine NEGATIVE NEGATIVE   Microscopic Only, Urine   Result Value Ref Range    WBC, Urine 1-5 1-5, NONE /HPF    RBC, Urine 1-2 NONE, 1-2, 3-5 /HPF    Mucus, Urine FEW Reference range not established. /LPF    Amorphous Crystals, Urine 1+ NONE, 1+, 2+ /HPF   CBC   Result Value Ref Range    WBC 19.6 (H) 4.4 - 11.3 x10*3/uL    nRBC 0.1 (H) 0.0 - 0.0 /100 WBCs    RBC 3.05 (L) 4.50 - 5.90 x10*6/uL    Hemoglobin 11.7 (L) 13.5 - 17.5 g/dL    Hematocrit 35.0 (L) 41.0 - 52.0 %     (H) 80 - 100 fL    MCH 38.4 (H) 26.0 - 34.0 pg    MCHC 33.4 32.0 - 36.0 g/dL    RDW 13.9 11.5 - 14.5 %    Platelets 192 150 - 450 x10*3/uL   Magnesium   Result Value Ref Range    Magnesium 1.94 1.60 - 2.40 mg/dL   Renal Function Panel   Result Value Ref Range    Glucose 105 (H) 74 - 99 mg/dL    Sodium 140 136 - 145 mmol/L    Potassium 3.7 3.5 - 5.3 mmol/L    Chloride 106 98 - 107 mmol/L    Bicarbonate 24 21 - 32 mmol/L    Anion Gap 14 10 - 20 mmol/L    Urea Nitrogen  16 6 - 23 mg/dL    Creatinine 1.26 0.50 - 1.30 mg/dL    eGFR 64 >60 mL/min/1.73m*2    Calcium 8.0 (L) 8.6 - 10.3 mg/dL    Phosphorus 1.8 (L) 2.5 - 4.9 mg/dL    Albumin 3.3 (L) 3.4 - 5.0 g/dL       Assessment/Plan        Principal Problem:    Acute pancreatitis, unspecified complication status, unspecified pancreatitis type    Acute pancreatitis, unspecified complication status, unspecified pancreatitis type     Luis Bucio is a 63 y.o. male with PMH of CAD (coronary bypass 2022) who presented to the hospital for abdominal pain 2/2 acute pancreatitis.        Acute medical issues  #Acute pancreatitis,  -RUQ demonstrated contracted gallbladder containing hyperechoci sludge and/or tones in the dependent portion. Negative sonographic Rangel's sign. No biliary dilation.   -Urine ETOH level negative   -Lipid panel results demonstrate no evidence for etiology   -Pain management tylenol, oxycodone, dilaudid for breakthough pain.  -Zosyn discontinued  -LR infusion: 250/hr -> 150/hr 12/23/23   -NPO -> clear / full liquids 12/23/23, advance diet as tolerated  -follow up with GI outpatient for any further workup as needed for etiology of initial episode of acute pancreatitis      #constipation  -miralax daily added     Chronic medical issues  #CAD  -Asprin, metoprolol, Lipitor, Plavix  -Furosemide and amlodipine held  -Social work consulted 2/2 inability to pay for medication     #Insomnia  -Trazodone     Fluids: IVF 250cc/hr  Electrolytes: Replete as needed   Nutrition: NPO, advance as tolerated  GI Ppx: Protonix  DVT Ppx: Lovenox     Oxygenation: None  Antibiotics: None     Dispo: Luis Bucio is a 63 y.o. male with PMH of CAD (coronary bypass 2022) who presented to the hospital for abdominal pain 2/2 acute pancreatitis.  IVF and pain regimen. Advance diet.           Kody Gonzalez DO

## 2023-12-23 NOTE — CARE PLAN
The patient's goals for the shift include pain control    The clinical goals for the shift include pain control

## 2023-12-23 NOTE — CARE PLAN
Problem: Pain - Adult  Goal: Verbalizes/displays adequate comfort level or baseline comfort level  Outcome: Progressing     Problem: Safety - Adult  Goal: Free from fall injury  Outcome: Progressing     Problem: Chronic Conditions and Co-morbidities  Goal: Patient's chronic conditions and co-morbidity symptoms are monitored and maintained or improved  Outcome: Progressing   The patient's goals for the shift include pain control    The clinical goals for the shift include fluids and pain control

## 2023-12-24 LAB
ALBUMIN SERPL BCP-MCNC: 3.1 G/DL (ref 3.4–5)
ALP SERPL-CCNC: 69 U/L (ref 33–136)
ALT SERPL W P-5'-P-CCNC: 39 U/L (ref 10–52)
ANION GAP SERPL CALC-SCNC: 12 MMOL/L (ref 10–20)
AST SERPL W P-5'-P-CCNC: 38 U/L (ref 9–39)
BASOPHILS # BLD AUTO: 0.06 X10*3/UL (ref 0–0.1)
BASOPHILS NFR BLD AUTO: 0.4 %
BILIRUB SERPL-MCNC: 2 MG/DL (ref 0–1.2)
BUN SERPL-MCNC: 12 MG/DL (ref 6–23)
CALCIUM SERPL-MCNC: 7.9 MG/DL (ref 8.6–10.3)
CHLORIDE SERPL-SCNC: 105 MMOL/L (ref 98–107)
CO2 SERPL-SCNC: 26 MMOL/L (ref 21–32)
CREAT SERPL-MCNC: 1.1 MG/DL (ref 0.5–1.3)
EOSINOPHIL # BLD AUTO: 0.14 X10*3/UL (ref 0–0.7)
EOSINOPHIL NFR BLD AUTO: 0.9 %
ERYTHROCYTE [DISTWIDTH] IN BLOOD BY AUTOMATED COUNT: 13.7 % (ref 11.5–14.5)
GFR SERPL CREATININE-BSD FRML MDRD: 76 ML/MIN/1.73M*2
GLUCOSE SERPL-MCNC: 95 MG/DL (ref 74–99)
HCT VFR BLD AUTO: 32.5 % (ref 41–52)
HGB BLD-MCNC: 11 G/DL (ref 13.5–17.5)
IGG4 SER-MCNC: 23 MG/DL (ref 3–200)
IMM GRANULOCYTES # BLD AUTO: 0.16 X10*3/UL (ref 0–0.7)
IMM GRANULOCYTES NFR BLD AUTO: 1 % (ref 0–0.9)
LIPASE SERPL-CCNC: 30 U/L (ref 9–82)
LYMPHOCYTES # BLD AUTO: 1.87 X10*3/UL (ref 1.2–4.8)
LYMPHOCYTES NFR BLD AUTO: 12.1 %
MAGNESIUM SERPL-MCNC: 1.92 MG/DL (ref 1.6–2.4)
MCH RBC QN AUTO: 38.2 PG (ref 26–34)
MCHC RBC AUTO-ENTMCNC: 33.8 G/DL (ref 32–36)
MCV RBC AUTO: 113 FL (ref 80–100)
MONOCYTES # BLD AUTO: 1.13 X10*3/UL (ref 0.1–1)
MONOCYTES NFR BLD AUTO: 7.3 %
NEUTROPHILS # BLD AUTO: 12.11 X10*3/UL (ref 1.2–7.7)
NEUTROPHILS NFR BLD AUTO: 78.3 %
NRBC BLD-RTO: 0.1 /100 WBCS (ref 0–0)
PHOSPHATE SERPL-MCNC: 1.8 MG/DL (ref 2.5–4.9)
PLATELET # BLD AUTO: 231 X10*3/UL (ref 150–450)
POTASSIUM SERPL-SCNC: 3.6 MMOL/L (ref 3.5–5.3)
PROT SERPL-MCNC: 5.9 G/DL (ref 6.4–8.2)
RBC # BLD AUTO: 2.88 X10*6/UL (ref 4.5–5.9)
SODIUM SERPL-SCNC: 139 MMOL/L (ref 136–145)
WBC # BLD AUTO: 15.5 X10*3/UL (ref 4.4–11.3)

## 2023-12-24 PROCEDURE — 85025 COMPLETE CBC W/AUTO DIFF WBC: CPT

## 2023-12-24 PROCEDURE — 83690 ASSAY OF LIPASE: CPT | Performed by: INTERNAL MEDICINE

## 2023-12-24 PROCEDURE — 2500000001 HC RX 250 WO HCPCS SELF ADMINISTERED DRUGS (ALT 637 FOR MEDICARE OP)

## 2023-12-24 PROCEDURE — 2500000004 HC RX 250 GENERAL PHARMACY W/ HCPCS (ALT 636 FOR OP/ED): Performed by: INTERNAL MEDICINE

## 2023-12-24 PROCEDURE — 82787 IGG 1 2 3 OR 4 EACH: CPT | Mod: GEALAB

## 2023-12-24 PROCEDURE — 99233 SBSQ HOSP IP/OBS HIGH 50: CPT | Performed by: INTERNAL MEDICINE

## 2023-12-24 PROCEDURE — 36415 COLL VENOUS BLD VENIPUNCTURE: CPT

## 2023-12-24 PROCEDURE — C9113 INJ PANTOPRAZOLE SODIUM, VIA: HCPCS

## 2023-12-24 PROCEDURE — 96372 THER/PROPH/DIAG INJ SC/IM: CPT

## 2023-12-24 PROCEDURE — 83735 ASSAY OF MAGNESIUM: CPT

## 2023-12-24 PROCEDURE — 1100000001 HC PRIVATE ROOM DAILY

## 2023-12-24 PROCEDURE — 84100 ASSAY OF PHOSPHORUS: CPT

## 2023-12-24 PROCEDURE — 80053 COMPREHEN METABOLIC PANEL: CPT

## 2023-12-24 PROCEDURE — 2500000004 HC RX 250 GENERAL PHARMACY W/ HCPCS (ALT 636 FOR OP/ED)

## 2023-12-24 RX ORDER — SIMETHICONE 80 MG
120 TABLET,CHEWABLE ORAL 4 TIMES DAILY PRN
Status: DISCONTINUED | OUTPATIENT
Start: 2023-12-24 | End: 2023-12-25 | Stop reason: HOSPADM

## 2023-12-24 RX ORDER — PANTOPRAZOLE SODIUM 40 MG/1
40 TABLET, DELAYED RELEASE ORAL
Status: DISCONTINUED | OUTPATIENT
Start: 2023-12-25 | End: 2023-12-25 | Stop reason: HOSPADM

## 2023-12-24 RX ORDER — ONDANSETRON HYDROCHLORIDE 2 MG/ML
4 INJECTION, SOLUTION INTRAVENOUS ONCE
Status: COMPLETED | OUTPATIENT
Start: 2023-12-24 | End: 2023-12-24

## 2023-12-24 RX ORDER — TRIMETHOBENZAMIDE HYDROCHLORIDE 300 MG/1
300 CAPSULE ORAL 3 TIMES DAILY
Status: DISCONTINUED | OUTPATIENT
Start: 2023-12-24 | End: 2023-12-24

## 2023-12-24 RX ADMIN — METOPROLOL TARTRATE 25 MG: 25 TABLET, FILM COATED ORAL at 21:01

## 2023-12-24 RX ADMIN — SODIUM CHLORIDE, POTASSIUM CHLORIDE, SODIUM LACTATE AND CALCIUM CHLORIDE 150 ML/HR: 600; 310; 30; 20 INJECTION, SOLUTION INTRAVENOUS at 04:09

## 2023-12-24 RX ADMIN — Medication 500 MG: at 21:01

## 2023-12-24 RX ADMIN — METOPROLOL TARTRATE 25 MG: 25 TABLET, FILM COATED ORAL at 10:14

## 2023-12-24 RX ADMIN — HYDROMORPHONE HYDROCHLORIDE 0.2 MG: 1 INJECTION, SOLUTION INTRAMUSCULAR; INTRAVENOUS; SUBCUTANEOUS at 09:19

## 2023-12-24 RX ADMIN — PANTOPRAZOLE SODIUM 40 MG: 40 INJECTION, POWDER, FOR SOLUTION INTRAVENOUS at 09:18

## 2023-12-24 RX ADMIN — OXYCODONE HYDROCHLORIDE 5 MG: 5 TABLET ORAL at 04:14

## 2023-12-24 RX ADMIN — Medication 500 MG: at 17:34

## 2023-12-24 RX ADMIN — SIMETHICONE 120 MG: 80 TABLET, CHEWABLE ORAL at 17:34

## 2023-12-24 RX ADMIN — GUAIFENESIN 600 MG: 600 TABLET ORAL at 10:14

## 2023-12-24 RX ADMIN — CLOPIDOGREL 75 MG: 75 TABLET ORAL at 10:14

## 2023-12-24 RX ADMIN — AMLODIPINE BESYLATE 5 MG: 5 TABLET ORAL at 10:14

## 2023-12-24 RX ADMIN — ATORVASTATIN CALCIUM 80 MG: 80 TABLET, FILM COATED ORAL at 21:01

## 2023-12-24 RX ADMIN — ONDANSETRON 4 MG: 2 INJECTION INTRAMUSCULAR; INTRAVENOUS at 07:41

## 2023-12-24 RX ADMIN — TRAZODONE HYDROCHLORIDE 50 MG: 50 TABLET ORAL at 21:01

## 2023-12-24 RX ADMIN — ONDANSETRON 4 MG: 2 INJECTION INTRAMUSCULAR; INTRAVENOUS at 09:18

## 2023-12-24 RX ADMIN — ASPIRIN 81 MG: 81 TABLET, COATED ORAL at 10:14

## 2023-12-24 RX ADMIN — ENOXAPARIN SODIUM 40 MG: 40 INJECTION SUBCUTANEOUS at 10:14

## 2023-12-24 ASSESSMENT — COGNITIVE AND FUNCTIONAL STATUS - GENERAL
DAILY ACTIVITIY SCORE: 24
MOBILITY SCORE: 24
MOBILITY SCORE: 24
DAILY ACTIVITIY SCORE: 24

## 2023-12-24 ASSESSMENT — PAIN - FUNCTIONAL ASSESSMENT
PAIN_FUNCTIONAL_ASSESSMENT: 0-10

## 2023-12-24 ASSESSMENT — PAIN SCALES - GENERAL
PAINLEVEL_OUTOF10: 7
PAINLEVEL_OUTOF10: 0 - NO PAIN
PAINLEVEL_OUTOF10: 3
PAINLEVEL_OUTOF10: 4

## 2023-12-24 NOTE — CARE PLAN
The patient's goals for the shift include pain control    The clinical goals for the shift include pt will tolerate diet    Over the shift, the patient did not make progress toward the following goals. Barriers to progression include pain and nausea. Recommendations to address these barriers include support and medication.

## 2023-12-24 NOTE — PROGRESS NOTES
Internal Medicine - Daily Progress Note     Chief Complaint   No chief complaint on file.    Subjective    Luis Bucio is a 63 y.o. male with PMH of CAD (coronary bypass 2022) who presented to the hospital for abdominal pain 2/2 acute pancreatitis and duodenitis    Overnight events:  - Patient had 3 loose bowel movements.  Patient states that first 2 bowel movements were small in quantity.  The last bowel movement was big, watery, with pellets.  Associated with abdominal bloating and cramping.  - Patient states he is tolerating diet well otherwise  - Has minimum abdominal pain  - Patient however continues to have nausea and  dry heaving    Meds    Scheduled medications  amLODIPine, 5 mg, oral, Daily  aspirin, 81 mg, oral, Daily  atorvastatin, 80 mg, oral, Nightly  clopidogrel, 75 mg, oral, Daily  enoxaparin, 40 mg, subcutaneous, q24h  [Held by provider] furosemide, 20 mg, oral, Daily  metoprolol tartrate, 25 mg, oral, BID  pantoprazole, 40 mg, intravenous, Daily  polyethylene glycol, 17 g, oral, Daily  traZODone, 50 mg, oral, Nightly      Continuous medications     PRN medications  PRN medications: acetaminophen, guaiFENesin, HYDROmorphone, oxyCODONE, oxyCODONE, simethicone, trimethobenzamide     Objective    Physical Exam  Constitutional:       Appearance: Normal appearance.   HENT:      Head: Normocephalic and atraumatic.      Mouth/Throat:      Mouth: Mucous membranes are moist.   Cardiovascular:      Rate and Rhythm: Normal rate and regular rhythm.      Heart sounds: No murmur heard.     No friction rub. No gallop.   Pulmonary:      Breath sounds: Normal breath sounds. No stridor. No wheezing or rhonchi.   Abdominal:      General: Abdomen is flat. Bowel sounds are normal.      Palpations: Abdomen is soft. There is no mass.      Tenderness: There is no abdominal tenderness. There is no guarding.   Musculoskeletal:         General: No swelling or tenderness. Normal range of motion.      Cervical back: Normal  "range of motion.      Right lower leg: Edema present.      Left lower leg: Edema present.   Neurological:      General: No focal deficit present.      Mental Status: He is alert and oriented to person, place, and time.   Psychiatric:         Mood and Affect: Mood normal.         Behavior: Behavior normal.        Visit Vitals  /89 (BP Location: Left arm, Patient Position: Lying) Comment (BP Location): Lower   Pulse 96   Temp 36.7 °C (98.1 °F) (Temporal)   Resp 18   Ht 1.803 m (5' 11\")   Wt 110 kg (241 lb 13.5 oz)   SpO2 91%   BMI 33.73 kg/m²   Smoking Status Former   BSA 2.35 m²        Intake/Output Summary (Last 24 hours) at 12/24/2023 1432  Last data filed at 12/24/2023 1103  Gross per 24 hour   Intake 3195 ml   Output --   Net 3195 ml       Labs:   Results from last 72 hours   Lab Units 12/24/23  0455 12/23/23  0506 12/22/23  0528   SODIUM mmol/L 139 140 138   POTASSIUM mmol/L 3.6 3.7 4.2   CHLORIDE mmol/L 105 106 105   CO2 mmol/L 26 24 25   BUN mg/dL 12 16 22   CREATININE mg/dL 1.10 1.26 1.62*   GLUCOSE mg/dL 95 105* 130*   CALCIUM mg/dL 7.9* 8.0* 8.0*   ANION GAP mmol/L 12 14 12   EGFR mL/min/1.73m*2 76 64 48*   PHOSPHORUS mg/dL 1.8* 1.8*  --       Results from last 72 hours   Lab Units 12/24/23  0455 12/23/23  0506 12/22/23  0528 12/21/23  1552   WBC AUTO x10*3/uL 15.5* 19.6* 20.1* 20.7*   HEMOGLOBIN g/dL 11.0* 11.7* 13.3* 15.5   HEMATOCRIT % 32.5* 35.0* 39.3* 44.2   PLATELETS AUTO x10*3/uL 231 192 218 266   NEUTROS PCT AUTO % 78.3  --   --  85.4   LYMPHS PCT AUTO % 12.1  --   --  9.7   MONOS PCT AUTO % 7.3  --   --  4.3   EOS PCT AUTO % 0.9  --   --  0.0      Lab Results   Component Value Date    CALCIUM 7.9 (L) 12/24/2023    PHOS 1.8 (L) 12/24/2023       Images    US right upper quadrant    Result Date: 12/22/2023  Interpreted By:  Jamshid Herrera, STUDY: US RIGHT UPPER QUADRANT;  12/22/2023 8:42 am   INDICATION: Signs/Symptoms:acute pancreaatitis.   COMPARISON: CT abdomen/pelvis of 12/21/2023.   " ACCESSION NUMBER(S): [id number]   ORDERING CLINICIAN: DENI SEBASTIAN   TECHNIQUE: Real-time sonographic evaluation of the right upper quadrant was performed.   FINDINGS: LIVER: There is nonspecific mild coarsened appearance of the hepatic parenchyma. No discrete hepatic lesion is demonstrated. Liver measures approximately 17 cm in sagittal dimension.   GALLBLADDER: Gallbladder is contracted limiting evaluation. Some hyperechoic material within the dependent portion of the gallbladder may represent combination of sludge and small stones. Sonographic Rangel sign is negative.   BILIARY TREE: Visualized portion of the common bile duct is not dilated measuring 4 mm in diameter. Distal common bile duct obscured by bowel gas.   PANCREAS: The pancreas is poorly visualized due to overlying bowel gas.   RIGHT KIDNEY: Right kidney measures  9.3cm in length.  No right hydronephrosis is seen.       Contracted gallbladder containing hyperechoic sludge and/or stones in the dependent portion. Negative sonographic Rangel's sign. No biliary dilation.   No focal hepatic lesion demonstrated.   Sub visualization of the pancreas.   MACRO: None.   Signed by: Jamshid Herrera 12/22/2023 8:55 AM Dictation workstation:   [id number]    CT abdomen pelvis wo IV contrast    Result Date: 12/21/2023  Interpreted By:  Yoan Starr, STUDY: CT ABDOMEN PELVIS WO IV CONTRAST;  12/21/2023 4:02 pm   INDICATION: Signs/Symptoms:Abdominal pain.   COMPARISON: 05/22/2023   ACCESSION NUMBER(S): [id number]   ORDERING CLINICIAN: MARGARITA GUERRERO   TECHNIQUE: CT of the abdomen and pelvis was performed. Contiguous axial images were obtained at 3 mm slice thickness through the abdomen and pelvis. Coronal and sagittal reconstructions at 3 mm slice thickness were performed.  No intravenous contrast was administered; positive oral contrast was given.   FINDINGS: Please note that the evaluation of vessels, lymph nodes and organs is limited without intravenous  contrast.   LOWER CHEST: The lung bases are grossly clear. Hyperinflation. Tiny hiatal hernia.   ABDOMEN:   LIVER: Grossly unremarkable. No definite focal liver lesions.   BILE DUCTS: No bile duct dilatation.   GALLBLADDER: Suboptimally distended gallbladder. It is suboptimally evaluated.   PANCREAS: There is peripancreatic fat stranding and inflammatory changes. Homogeneous pancreas otherwise.   SPLEEN: Unremarkable spleen.   ADRENAL GLANDS: No adrenal masses.   KIDNEYS AND URETERS: No evidence of hydronephrosis. No definite renal masses or stones. No hydroureter.   PELVIS:   BLADDER: Suboptimally distended and evaluated.   REPRODUCTIVE ORGANS: No definite masses.   BOWEL: There is fat stranding and inflammatory changes surrounding the transverse portion of the duodenum. No evidence of bowel obstruction. Diverticulosis without definite diverticulitis.       VESSELS: Aortic calcification. No aneurysmal dilatation.   PERITONEUM/RETROPERITONEUM/LYMPH NODES: There is retroperitoneal fat stranding and fluid.   ABDOMINAL WALL: Small fat containing umbilical hernia.   BONES: Discogenic degenerative changes.       1.  Moderate to severe amount of fat stranding and inflammatory changes in the retroperitoneum surrounding the pancreas and duodenum. Findings may represent pancreatitis and/or duodenitis. Clinical and laboratory correlation and follow-up is recommended.     MACRO: None   Signed by: Yoan Starr 12/21/2023 4:32 PM Dictation workstation:   [id number]    ECG 12 Lead    Result Date: 12/18/2023  Normal sinus rhythm Left ventricular hypertrophy with repolarization abnormality Abnormal ECG When compared with ECG of 10-MARGO-2023 22:39, Previous ECG has undetermined rhythm, needs review T wave inversion more evident in Lateral leads    XR chest 1 view    Result Date: 12/17/2023  Interpreted By:  Rajiv Alexander, STUDY: Chest dated  12/17/2023.   INDICATION: Signs/Symptoms:chest pain   COMPARISON: Chest dated 06/10/2020.    ACCESSION NUMBER(S): [id number]   ORDERING CLINICIAN: DARIAN FUNES   TECHNIQUE: One view of the chest.   FINDINGS: The lungs are clear.  No pneumothorax or effusion is evident. The cardiomediastinal silhouette is  not enlarged.Surgical changes are seen of the mediastinum.Degenerative change is seen of the spine and shoulders.       No acute cardiopulmonary process is evident.   MACRO: None   Signed by: Rajiv Alexander 12/17/2023 11:01 AM Dictation workstation:   [id number]      Assessment and Plan    Luis Bucio is a 63 y.o. male admitted on 12/22/2023 with abdominal pain.  Found to have pancreatitis and duodenitis.  Initially patient was n.p.o.  Diet was transitioned to clear liquids on 12/23.      ACUTE MEDICAL ISSUES:  # Acute pancreatitis  # Acute duodenitis  # Nausea  - RUQ demonstrated contracted gallbladder containing hyperechoci sludge and/or tones in the dependent portion. Negative sonographic Rangel's sign. No biliary dilation.   - CTAP with fat stranding and inflammatory changes surrounding the transverse portion of the duodenum.   - Lipid panel with LDL  of 157, cholesterol 205. But TGs WNL --> 119  - Lactate WNL --> 1.5  - Lipase down trended from 698 on 12/21 --> 30 this AM on 12/24  - Discontinued continuous fluids  - Diet advanced to full liquid on 12/24  - Patient continues to be nauseous with dry heaving. On Tigan 200 mg Q6Hr as needed   - Pain management tylenol 650 mg every 4 hours as needed for mild pain, oxycodone 5 and 10 mg every 6 hours as needed for moderate to severe pain, dilaudid 0.2 mg every 3 hours as needed for breakthough pain.  - Follow up with GI outpatient for any further workup as needed for etiology of initial episode of acute pancreatitis     # Abdominal bloating   - Ordered simethicone and 20 mg 4 times a day as needed for flatulence    # Hypophosphatemia:  - Ordered K-phos 500 mg 4 times a day.  To stop after 4 doses    ADDRESSED/ RESOLVED MEDICAL ISSUES:  #  Cholelithiasis  - CTAP with ome hyperechoic material within the dependent portion of the gallbladder may represent combination of sludge and small stones   - Outpatient follow-up with general surgery     CHRONIC MEDICAL ISSUES:  # CAD  # HTN  # HLD  - Continue aspirin 81 mg every day  - Continue Plavix 75 mg every  - Continue metoprolol 25 mg every day  - Continue amlodipine 5 mg every day  - Continue Lipitor 80 mg every night  - Currently holding furosemide 20 mg every day  - Social work consulted 2/2 inability to pay for medication     # Insomnia  - Continue Trazodone 50 mg every night    Fluids: Bolus as needed  Electrolytes: Replete as needed  Nutrition: Adult full liquid diet  Antimicrobials: None indicated  DVT ppx: Lovenox 40 mg every 24 hours  GI ppx: Pantoprazole 40 mg  Catheter: None  Lines: 1 20-gauge PIV in right anterior forearm  Code: Full Code     Disposition: Admitted for abdominal pain.  Found to have pancreatitis and duodenitis.  Given current nausea and dry heaving will observe for another day.  Likely discharge tomorrow once patient tolerates diet well    Vidya Barajas MD  Internal Medicine, PGY- 1  12/24/23 at 2:32 PM     Disclaimer: Documentation completed with the information available at the time of input. The times in the chart may not be reflective of actual patient care times, interventions, or procedures. Documentation occurs after the physical care of the patient.

## 2023-12-24 NOTE — CARE PLAN
Problem: Pain - Adult  Goal: Verbalizes/displays adequate comfort level or baseline comfort level  Outcome: Progressing     Problem: Safety - Adult  Goal: Free from fall injury  Outcome: Progressing     Problem: Chronic Conditions and Co-morbidities  Goal: Patient's chronic conditions and co-morbidity symptoms are monitored and maintained or improved  Outcome: Progressing     The patient's goals for the shift include pain control    The clinical goals for the shift include pain control    Over the shift, the patient did not make progress toward the following goals. Barriers to progression include pain. Recommendations to address these barriers include pain meds, rest, and diet tolerance.

## 2023-12-25 ENCOUNTER — APPOINTMENT (OUTPATIENT)
Dept: RADIOLOGY | Facility: HOSPITAL | Age: 63
End: 2023-12-25
Payer: MEDICAID

## 2023-12-25 VITALS
HEIGHT: 71 IN | BODY MASS INDEX: 33.86 KG/M2 | OXYGEN SATURATION: 94 % | RESPIRATION RATE: 24 BRPM | HEART RATE: 105 BPM | WEIGHT: 241.84 LBS | TEMPERATURE: 98.2 F | DIASTOLIC BLOOD PRESSURE: 92 MMHG | SYSTOLIC BLOOD PRESSURE: 148 MMHG

## 2023-12-25 PROBLEM — K85.90 ACUTE PANCREATITIS, UNSPECIFIED COMPLICATION STATUS, UNSPECIFIED PANCREATITIS TYPE (HHS-HCC): Status: RESOLVED | Noted: 2023-12-22 | Resolved: 2023-12-25

## 2023-12-25 LAB
ALBUMIN SERPL BCP-MCNC: 3.3 G/DL (ref 3.4–5)
ANION GAP SERPL CALC-SCNC: 13 MMOL/L (ref 10–20)
BUN SERPL-MCNC: 10 MG/DL (ref 6–23)
CALCIUM SERPL-MCNC: 8.2 MG/DL (ref 8.6–10.3)
CHLORIDE SERPL-SCNC: 99 MMOL/L (ref 98–107)
CO2 SERPL-SCNC: 27 MMOL/L (ref 21–32)
CREAT SERPL-MCNC: 1.2 MG/DL (ref 0.5–1.3)
ERYTHROCYTE [DISTWIDTH] IN BLOOD BY AUTOMATED COUNT: 13.4 % (ref 11.5–14.5)
GFR SERPL CREATININE-BSD FRML MDRD: 68 ML/MIN/1.73M*2
GLUCOSE SERPL-MCNC: 105 MG/DL (ref 74–99)
HCT VFR BLD AUTO: 34.8 % (ref 41–52)
HGB BLD-MCNC: 12.2 G/DL (ref 13.5–17.5)
MAGNESIUM SERPL-MCNC: 1.96 MG/DL (ref 1.6–2.4)
MCH RBC QN AUTO: 38.7 PG (ref 26–34)
MCHC RBC AUTO-ENTMCNC: 35.1 G/DL (ref 32–36)
MCV RBC AUTO: 111 FL (ref 80–100)
NRBC BLD-RTO: 0.3 /100 WBCS (ref 0–0)
PHOSPHATE SERPL-MCNC: 2 MG/DL (ref 2.5–4.9)
PLATELET # BLD AUTO: 306 X10*3/UL (ref 150–450)
POTASSIUM SERPL-SCNC: 3.6 MMOL/L (ref 3.5–5.3)
RBC # BLD AUTO: 3.15 X10*6/UL (ref 4.5–5.9)
SODIUM SERPL-SCNC: 135 MMOL/L (ref 136–145)
WBC # BLD AUTO: 14.7 X10*3/UL (ref 4.4–11.3)

## 2023-12-25 PROCEDURE — 2500000004 HC RX 250 GENERAL PHARMACY W/ HCPCS (ALT 636 FOR OP/ED)

## 2023-12-25 PROCEDURE — 96372 THER/PROPH/DIAG INJ SC/IM: CPT

## 2023-12-25 PROCEDURE — 99239 HOSP IP/OBS DSCHRG MGMT >30: CPT

## 2023-12-25 PROCEDURE — 2500000001 HC RX 250 WO HCPCS SELF ADMINISTERED DRUGS (ALT 637 FOR MEDICARE OP)

## 2023-12-25 PROCEDURE — 85027 COMPLETE CBC AUTOMATED: CPT

## 2023-12-25 PROCEDURE — 83735 ASSAY OF MAGNESIUM: CPT

## 2023-12-25 PROCEDURE — 71045 X-RAY EXAM CHEST 1 VIEW: CPT

## 2023-12-25 PROCEDURE — 36415 COLL VENOUS BLD VENIPUNCTURE: CPT

## 2023-12-25 PROCEDURE — 71045 X-RAY EXAM CHEST 1 VIEW: CPT | Performed by: RADIOLOGY

## 2023-12-25 PROCEDURE — 80069 RENAL FUNCTION PANEL: CPT

## 2023-12-25 RX ORDER — AMLODIPINE BESYLATE 5 MG/1
5 TABLET ORAL DAILY
Qty: 30 TABLET | Refills: 0 | Status: SHIPPED | OUTPATIENT
Start: 2023-12-25 | End: 2024-02-09 | Stop reason: HOSPADM

## 2023-12-25 RX ORDER — CLOPIDOGREL BISULFATE 75 MG/1
75 TABLET ORAL DAILY
Qty: 30 TABLET | Refills: 0 | Status: SHIPPED | OUTPATIENT
Start: 2023-12-25 | End: 2024-01-24

## 2023-12-25 RX ORDER — FUROSEMIDE 20 MG/1
20 TABLET ORAL DAILY
Qty: 30 TABLET | Refills: 0 | Status: SHIPPED | OUTPATIENT
Start: 2023-12-26 | End: 2024-02-09 | Stop reason: HOSPADM

## 2023-12-25 RX ORDER — ONDANSETRON 4 MG/1
4 TABLET, FILM COATED ORAL EVERY 6 HOURS PRN
Qty: 20 TABLET | Refills: 0 | Status: SHIPPED | OUTPATIENT
Start: 2023-12-25 | End: 2023-12-30

## 2023-12-25 RX ORDER — TALC
3 POWDER (GRAM) TOPICAL ONCE
Status: COMPLETED | OUTPATIENT
Start: 2023-12-25 | End: 2023-12-25

## 2023-12-25 RX ORDER — ONDANSETRON 4 MG/1
4 TABLET, FILM COATED ORAL EVERY 6 HOURS PRN
Qty: 20 TABLET | Refills: 0 | Status: SHIPPED | OUTPATIENT
Start: 2023-12-25 | End: 2023-12-25 | Stop reason: SDUPTHER

## 2023-12-25 RX ORDER — FUROSEMIDE 10 MG/ML
40 INJECTION INTRAMUSCULAR; INTRAVENOUS ONCE
Status: COMPLETED | OUTPATIENT
Start: 2023-12-25 | End: 2023-12-25

## 2023-12-25 RX ORDER — ATORVASTATIN CALCIUM 80 MG/1
80 TABLET, FILM COATED ORAL DAILY
Qty: 30 TABLET | Refills: 0 | Status: SHIPPED | OUTPATIENT
Start: 2023-12-25 | End: 2024-02-09 | Stop reason: HOSPADM

## 2023-12-25 RX ADMIN — Medication 3 MG: at 02:56

## 2023-12-25 RX ADMIN — FUROSEMIDE 40 MG: 10 INJECTION, SOLUTION INTRAMUSCULAR; INTRAVENOUS at 10:22

## 2023-12-25 RX ADMIN — PANTOPRAZOLE SODIUM 40 MG: 40 TABLET, DELAYED RELEASE ORAL at 08:52

## 2023-12-25 RX ADMIN — TRIMETHOBENZAMIDE HYDROCHLORIDE 200 MG: 100 INJECTION INTRAMUSCULAR at 06:58

## 2023-12-25 RX ADMIN — CLOPIDOGREL 75 MG: 75 TABLET ORAL at 08:54

## 2023-12-25 RX ADMIN — Medication 500 MG: at 09:00

## 2023-12-25 RX ADMIN — ASPIRIN 81 MG: 81 TABLET, COATED ORAL at 08:54

## 2023-12-25 RX ADMIN — ENOXAPARIN SODIUM 40 MG: 40 INJECTION SUBCUTANEOUS at 08:54

## 2023-12-25 RX ADMIN — METOPROLOL TARTRATE 25 MG: 25 TABLET, FILM COATED ORAL at 08:54

## 2023-12-25 RX ADMIN — AMLODIPINE BESYLATE 5 MG: 5 TABLET ORAL at 08:54

## 2023-12-25 ASSESSMENT — PAIN SCALES - GENERAL: PAINLEVEL_OUTOF10: 3

## 2023-12-25 NOTE — CARE PLAN
The patient's goals for the shift include pain control    The clinical goals for the shift include pain control    Problem: Pain - Adult  Goal: Verbalizes/displays adequate comfort level or baseline comfort level  Outcome: Progressing     Problem: Safety - Adult  Goal: Free from fall injury  Outcome: Progressing     Problem: Chronic Conditions and Co-morbidities  Goal: Patient's chronic conditions and co-morbidity symptoms are monitored and maintained or improved  Outcome: Progressing     No barriers

## 2023-12-25 NOTE — CARE PLAN
The patient's goals for the shift include pain control    The clinical goals for the shift include pain control  Patient discharged 12/25 to home.     Problem: Pain - Adult  Goal: Verbalizes/displays adequate comfort level or baseline comfort level  Outcome: Progressing     Problem: Safety - Adult  Goal: Free from fall injury  Outcome: Progressing

## 2023-12-25 NOTE — DISCHARGE INSTRUCTIONS
1) Please, take your home medications as instructed after being discharged from the hospital.    NEW MEDICATIONS:    You have been prescribed Zofran for nausea. Please take up to every 6 hours as needed for Nausea     Your following home medications have been reordered too     Atorvastatin 80 mg daily   Clopidogrel 75 mg daily   Amlodipine 5 mg daily     Please make sure you follow up with your Primary Care Physician in the next week    2) Please, follow-up with your primary care provider within 7 to 14 days after leaving the hospital. /appointment services has been requested to make an appointment for you, however if you do not hear back from them within 1 to 2 days, please call your primary physician's office to schedule an appointment. Bring your photo ID and insurance card to your appointment.   [facility]  services can be reached at [phone] and appointment services can be reached at [phone].    - Please, call   or appointment services and schedule a follow-up with your PCP within 1-2 weeks after you leave the hospital.    3) If you experience any worsening symptoms or have any concerns, please contact your primary care provider to schedule an appointment. If you cannot get in touch with your primary care physician, please return to the nearest emergency room or urgent care clinic for an evaluation and treatment.    Thank you for choosing [facility] and allowing us to partake in your medical care!    - Your primary care inpatient team.

## 2023-12-25 NOTE — CARE PLAN
Met with pt re: prescriptions he doesn't take because they are not covered by his insurance.  Pt tells me he has not taken some medications (mostly cardiac related) because his insurance does not pay for them or he has copays.  Pt was unable to name the specific medications,because it has been so long.  Pt states he has a PCP Dr Knight, and a cardiac doctor, Alyx, in [place].  He had another cardiac MD downtown.  Encouraged pt to make an appt with his cardiologist to review needed meds and to try again to see if they are covered.  Then discuss with cardiologist the meds he cannot afford.  Pt is discharged today.

## 2023-12-25 NOTE — HOSPITAL COURSE
63 y.o. male with PMH of CAD (coronary bypass 2022) who presented to the hospital for abdominal pain 2/2 acute pancreatitis. Patient was started on Zosyn and had pain control continued from ED, along with IVF.     Patient continued to progress through hospital  course with diet advancing from NP to clear to full liquid and eventually a regular diet. Pain and nausea progressively improved as well per patient. Patient on 12/25 early morning had some shortness of breath that was likely secondary to the fluids he'd previously received in setting of management of acute pancreatitis, patient was given IV lasix and had strong urine response. Patient was reevaluated and indicated he felt stable for discharge. Was discharged home with follow up with PCP.

## 2023-12-25 NOTE — DISCHARGE SUMMARY
Discharge Diagnosis  Acute pancreatitis  Dyspnea  Untreated sleep apnea    Issues Requiring Follow-Up  None    Discharge Meds     Your medication list        START taking these medications        Instructions Last Dose Given Next Dose Due   ondansetron 4 mg tablet  Commonly known as: Zofran      Take 1 tablet (4 mg) by mouth every 6 hours if needed for nausea or vomiting.              CONTINUE taking these medications        Instructions Last Dose Given Next Dose Due   Adult Low Dose Aspirin 81 mg EC tablet  Generic drug: aspirin           metoprolol tartrate 25 mg tablet  Commonly known as: Lopressor      take 1 tablet by mouth twice a day                 Where to Get Your Medications        These medications were sent to [id number] - [facility] [address]  [address] [place] [address]      Phone: [phone]   ondansetron 4 mg tablet         Test Results Pending At Discharge  Pending Labs       Order Current Status    Pathologist Review-CBC Differential In process            Hospital Course  63 y.o. male with PMH of CAD (coronary bypass 2022) who presented to the hospital for abdominal pain 2/2 acute pancreatitis. Patient was started on Zosyn and had pain control continued from ED, along with IVF.     Patient continued to progress through hospital  course with diet advancing from NP to clear to full liquid and eventually a regular diet. Pain and nausea progressively improved as well per patient. Patient on 12/25 early morning had some shortness of breath that was likely secondary to the fluids he'd previously received in setting of management of acute pancreatitis, patient was given IV lasix and had strong urine response and then improvement in his symptoms. Patient was reevaluated and indicated he felt stable for discharge. Was discharged home with follow up with PCP.         Pertinent Physical Exam At Time of Discharge  Physical Exam  Constitutional:       Appearance: Normal appearance.    HENT:      Mouth/Throat:      Mouth: Mucous membranes are moist.      Pharynx: Oropharynx is clear.   Cardiovascular:      Rate and Rhythm: Normal rate and regular rhythm.   Pulmonary:      Effort: Pulmonary effort is normal.   Abdominal:      Tenderness: There is no abdominal tenderness. There is no guarding or rebound.   Musculoskeletal:         General: Normal range of motion.   Neurological:      Mental Status: He is alert. Mental status is at baseline.   Psychiatric:         Mood and Affect: Mood normal.         Behavior: Behavior normal.         Outpatient Follow-Up    Patient will follow up with PCP as outpatient after discharge.     Tomás Warren MD  Transitional Year, PGY-1

## 2023-12-27 NOTE — SIGNIFICANT EVENT
Follow Up Phone Call    Outgoing phone call    Spoke to: Luis Bucio Relationship:self   Phone number: [phone]      Outcome: I left a message on answering machine   No chief complaint on file.         Diagnosis:Not applicable

## 2024-01-02 ENCOUNTER — HOSPITAL ENCOUNTER (OUTPATIENT)
Dept: RADIOLOGY | Facility: HOSPITAL | Age: 64
Discharge: HOME | End: 2024-01-02
Payer: MEDICAID

## 2024-01-02 DIAGNOSIS — R06.02 SHORTNESS OF BREATH: ICD-10-CM

## 2024-01-02 PROCEDURE — 71046 X-RAY EXAM CHEST 2 VIEWS: CPT

## 2024-01-02 PROCEDURE — 71046 X-RAY EXAM CHEST 2 VIEWS: CPT | Performed by: RADIOLOGY

## 2024-01-08 ENCOUNTER — OFFICE VISIT (OUTPATIENT)
Dept: CARDIOLOGY | Facility: CLINIC | Age: 64
End: 2024-01-08
Payer: MEDICAID

## 2024-01-08 VITALS
HEART RATE: 98 BPM | SYSTOLIC BLOOD PRESSURE: 121 MMHG | DIASTOLIC BLOOD PRESSURE: 85 MMHG | WEIGHT: 222 LBS | OXYGEN SATURATION: 98 % | BODY MASS INDEX: 30.96 KG/M2

## 2024-01-08 DIAGNOSIS — E78.2 HYPERLIPIDEMIA, MIXED: ICD-10-CM

## 2024-01-08 DIAGNOSIS — I10 PRIMARY HYPERTENSION: ICD-10-CM

## 2024-01-08 DIAGNOSIS — I50.42 CHRONIC COMBINED SYSTOLIC AND DIASTOLIC HEART FAILURE (MULTI): ICD-10-CM

## 2024-01-08 DIAGNOSIS — I25.5 ISCHEMIC CARDIOMYOPATHY: Primary | ICD-10-CM

## 2024-01-08 DIAGNOSIS — Z95.1 S/P CABG X 4: ICD-10-CM

## 2024-01-08 PROBLEM — R60.0 EDEMA LEG: Status: ACTIVE | Noted: 2024-01-08

## 2024-01-08 PROBLEM — R07.9 CHEST PAIN: Status: ACTIVE | Noted: 2023-03-13

## 2024-01-08 PROBLEM — I21.9 MYOCARDIAL INFARCTION (MULTI): Status: ACTIVE | Noted: 2022-06-30

## 2024-01-08 PROBLEM — I50.9 HEART FAILURE (MULTI): Status: ACTIVE | Noted: 2024-01-08

## 2024-01-08 PROBLEM — J90 PLEURAL EFFUSION, NOT ELSEWHERE CLASSIFIED: Status: ACTIVE | Noted: 2022-09-12

## 2024-01-08 PROBLEM — I25.110: Status: ACTIVE | Noted: 2022-07-18

## 2024-01-08 PROBLEM — R06.09 DYSPNEA ON EXERTION: Status: ACTIVE | Noted: 2024-01-08

## 2024-01-08 PROCEDURE — 3008F BODY MASS INDEX DOCD: CPT | Performed by: NURSE PRACTITIONER

## 2024-01-08 PROCEDURE — 99213 OFFICE O/P EST LOW 20 MIN: CPT | Performed by: NURSE PRACTITIONER

## 2024-01-08 PROCEDURE — 3079F DIAST BP 80-89 MM HG: CPT | Performed by: NURSE PRACTITIONER

## 2024-01-08 PROCEDURE — 1036F TOBACCO NON-USER: CPT | Performed by: NURSE PRACTITIONER

## 2024-01-08 PROCEDURE — 3074F SYST BP LT 130 MM HG: CPT | Performed by: NURSE PRACTITIONER

## 2024-01-08 RX ORDER — NITROGLYCERIN 0.4 MG/1
TABLET SUBLINGUAL
Status: ON HOLD | COMMUNITY
Start: 2022-07-01 | End: 2024-02-08 | Stop reason: SDUPTHER

## 2024-01-08 RX ORDER — TRAZODONE HYDROCHLORIDE 50 MG/1
TABLET ORAL
COMMUNITY
Start: 2022-08-08 | End: 2024-01-08 | Stop reason: ALTCHOICE

## 2024-01-08 RX ORDER — METOPROLOL SUCCINATE 25 MG/1
1 TABLET, EXTENDED RELEASE ORAL DAILY
COMMUNITY
Start: 2022-07-01 | End: 2024-01-08 | Stop reason: ALTCHOICE

## 2024-01-08 RX ORDER — AMIODARONE HYDROCHLORIDE 200 MG/1
1 TABLET ORAL 2 TIMES DAILY
COMMUNITY
Start: 2022-08-10 | End: 2024-01-08 | Stop reason: ALTCHOICE

## 2024-01-08 RX ORDER — ISOSORBIDE MONONITRATE 30 MG/1
30 TABLET, EXTENDED RELEASE ORAL DAILY
COMMUNITY
Start: 2023-05-02 | End: 2024-01-08 | Stop reason: ALTCHOICE

## 2024-01-08 RX ORDER — POTASSIUM CHLORIDE 20 MEQ/1
20 TABLET, EXTENDED RELEASE ORAL DAILY
COMMUNITY
Start: 2023-06-11 | End: 2024-01-08 | Stop reason: ALTCHOICE

## 2024-01-08 RX ORDER — LISINOPRIL 10 MG/1
1 TABLET ORAL DAILY
COMMUNITY
Start: 2021-12-23 | End: 2024-01-08 | Stop reason: ALTCHOICE

## 2024-01-08 NOTE — PROGRESS NOTES
Primary Care Physician: Michaelle Knight DO  Primary Cardiologist:       Date of Visit: 01/08/2024  2:40 PM EST  Location of visit:  [facility]   Type of Visit: Follow up             Chief Complaint   Patient presents with    Follow-up     Sob , hosp follow up,        HPI / Summary:   Luis Bucio is a 63 y.o. male  with   CAD s/p CABG x4 (LIMA to LAD, radial sequential to obtuse marginal 1 and obtuse marginal 2, and saphenous vein graft to PDA.Endoscopic vein and radial artery harvest. Left atrial appendage exclusion with AtriClip 40) 7/22/2022, Dr. Maier, HTN, HLD, moderately reduced LV systolic function 45-50%, post op AFib --> NSR.    who returns for  hospital follow up     He was hospitalized with Pancreatitis, discharged 12/25/23.  He had some volume overload after IVF resuscitation which resolved after IV lasix  He overall feels well now without chest discomfort or dyspnea       12 system review is negative except as noted above         Vitals:    01/08/24 1434   Weight: 101 kg (222 lb 0.1 oz)       Medical History:   Past Medical History:   Diagnosis Date    Hypertension     Other specified health status     No known problems       Social History:   Tobacco Use: Medium Risk (1/8/2024)    Patient History     Smoking Tobacco Use: Former     Smokeless Tobacco Use: Never     Passive Exposure: Not on file         MEDICATIONS:   Current Outpatient Medications   Medication Instructions    amLODIPine (NORVASC) 5 mg, oral, Daily    aspirin (Adult Low Dose Aspirin) 81 mg EC tablet 1 tablet DAILY (route: oral)    atorvastatin (LIPITOR) 80 mg, oral, Daily    clopidogrel (PLAVIX) 75 mg, oral, Daily    furosemide (LASIX) 20 mg, oral, Daily    metoprolol tartrate (LOPRESSOR) 25 mg, oral, 2 times daily    nitroglycerin (Nitrostat) 0.4 mg SL tablet sublingual         IMAGING REVIEWED:   Echocardiogram:   ECHOCARDIOGRAM 5/24/2023  CONCLUSIONS:  1. Left ventricular systolic function is normal with a 60-65%  estimated  ejection fraction.  2. Spectral Doppler shows an impaired relaxation pattern of left ventricular  diastolic filling.  3. There is moderate mitral annular calcification.  4. RVSP within normal limits.  5. Aortic valve appears abnormal.  6. Mild to moderate aortic valve stenosis.  7. There is moderate aortic valve cusp calcification.      Cardiac Catheterization:   Adult Cath     Narrative  [facility], Cath Lab, [address] [address]    Cardiovascular Catheterization Report    Patient Name:     CONNOR ARIAS   Performing Physician:  [id number] Tr Malik SR, MD  Study Date:       6/30/2022       Verifying Physician:   Miranda Malik MD  MRN/PID:          [id number]        Cardiologist/Co-scrub:  Accession/Order#: [id number]       Fellow:  YOB: 1960        Fellow:  Gender:           M               Referring Physician:   TR MALIK  Admit Date:                       Referring Physician:   -  Surgeon:                          Referring Physician:   -      Study: Left Heart Catheterization      Indications:  CONNOR ARIAS SR is a 62 year old male who presents with dyslipidemia and hypertension. Acute coronary syndrome > 24 hrs, with a chest pain assessment of typical angina. Study performed as an urgent cath procedure.    Medical History:  Stress test performed: No. CTA performed: No. [place] accessed: No. LVEF Assessed: Yes. LVEF = 55%.    Procedure Description:  After infiltration with 2% Lidocaine, the right radial artery was cannulated with a modified Seldinger technique. Subsequently a 6 [place] sheath was placed in the right radial artery. Selective coronary catheterization was performed using a 5 Fr and 6 Fr catheter(s) exchanged over a guide wire to cannulate the coronary arteries.  Multiple injections of contrast were made into the left and right coronary arteries with angiograms recorded in multiple  projections. After completion of the procedure, the arterial sheath was pulled and a TR Band Radial Compression Device was utilized to obtain patent hemostasis.    Coronary Angiography:  The coronary circulation is right dominant.    Left Main Coronary Artery:  The left main coronary artery showed no significant disease or stenosis greater than 30%.    Left Anterior Descending Coronary Artery Distribution:  The proximal to mid left anterior descending coronary artery showed 70% stenosis.    Circumflex Coronary Artery Distribution:  The mid circumflex coronary artery showed 70% stenosis. The proximal 1st obtuse marginal branch showed 100% stenosis.    Right Coronary Artery Distribution:    The distal right coronary artery showed 80% stenosis.    Coronary Interventions:    Coronary Lesion Summary:  Vessel       Stenosis    Vessel Segment  LAD        70% stenosis  proximal to mid  Circumflex 70% stenosis        mid  OM 1       100% stenosis    proximal  RCA        80% stenosis      distal         Cardiac Cath Transition of Care Summary:  Post Procedure Diagnosis: NSTEMI, multivessel CAD.  Blood Loss:               Estimated blood loss during the procedure was 5cc mls.  Specimens Removed:        Number of specimen(s) removed: none.      Recommendations:  Maximize medical therapy.  Refer to CTS for CABG evaluation.    ____________________________________________________________________________________  CONCLUSIONS:  1. Left main: no significant angiographic disease.  2. LAD: 70% prox-mid disease.  3. LCx: 70% mid-vessel disease, 100% proximal thrombotic OM1 that fills via left to left collaterals.  4. RCA: 80% distal stenosis.  5. LVEDP 12mmHg, mild aortic stenosis on LV-Ao gradient.       [id number] Bin Malik MD  Performing Physician  Electronically signed by [id number] Bin Malik MD on 7/7/2022 at 12:16:27 PM      LABS:  CBC with Differential:    Lab Results   Component Value Date    WBC 14.7 (H) 12/25/2023    RBC  3.15 (L) 12/25/2023    HGB 12.2 (L) 12/25/2023    HCT 34.8 (L) 12/25/2023     12/25/2023     (H) 12/25/2023    MCH 38.7 (H) 12/25/2023    MCHC 35.1 12/25/2023    RDW 13.4 12/25/2023    NRBC 0.3 (H) 12/25/2023    LYMPHOPCT 12.1 12/24/2023    MONOPCT 7.3 12/24/2023    EOSPCT 0.9 12/24/2023    BASOPCT 0.4 12/24/2023    MONOSABS 1.13 (H) 12/24/2023    LYMPHSABS 1.87 12/24/2023    EOSABS 0.14 12/24/2023    BASOSABS 0.06 12/24/2023     CMP:    Lab Results   Component Value Date     (L) 12/25/2023    K 3.6 12/25/2023    CL 99 12/25/2023    CO2 27 12/25/2023    BUN 10 12/25/2023    CREATININE 1.20 12/25/2023    GLUCOSE 105 (H) 12/25/2023    PROT 5.9 (L) 12/24/2023    CALCIUM 8.2 (L) 12/25/2023    BILITOT 2.0 (H) 12/24/2023    ALKPHOS 69 12/24/2023    AST 38 12/24/2023    ALT 39 12/24/2023     BMP:    Lab Results   Component Value Date     (L) 12/25/2023    K 3.6 12/25/2023    CL 99 12/25/2023    CO2 27 12/25/2023    BUN 10 12/25/2023    CREATININE 1.20 12/25/2023    CALCIUM 8.2 (L) 12/25/2023    GLUCOSE 105 (H) 12/25/2023     Magnesium:  Lab Results   Component Value Date    MG 1.96 12/25/2023     Troponin:    Lab Results   Component Value Date    TROPHS 8 12/17/2023    TROPHS 8 12/17/2023    TROPHS CANCELED 06/11/2023    TROPHS 3 06/11/2023    TROPHS 4 06/10/2023     BNP:   Lab Results   Component Value Date    BNP 54 06/10/2023         Lipid Panel:  Lab Results   Component Value Date    HDL 24.6 12/21/2023    CHHDL 8.3 12/21/2023    VLDL 24 12/21/2023    TRIG 119 12/21/2023    NHDL 180 (H) 12/21/2023        Lab work and imaging results independently reviewed by me     Visit Vitals  /85   Pulse 98   Wt 101 kg (222 lb 0.1 oz)   SpO2 98%   BMI 30.96 kg/m²   Smoking Status Former   BSA 2.25 m²         ECG dated 12/17/2023 independently reviewed   SR 62, LVH       Constitutional:       Appearance: Healthy appearance. Not in distress.   Eyes:      Conjunctiva/sclera: Conjunctivae normal.   Neck:       Vascular: JVD normal.   Pulmonary:      Effort: Pulmonary effort is normal.      Breath sounds: Normal breath sounds.   Cardiovascular:      PMI at left midclavicular line. Normal rate. Regular rhythm. Normal S1. Normal S2.       Murmurs: There is no murmur.      No rub.   Pulses:     Intact distal pulses.   Edema:     Peripheral edema absent.   Abdominal:      General: Bowel sounds are normal.   Musculoskeletal:      Cervical back: Neck supple. Skin:     General: Skin is warm and dry.   Neurological:      Mental Status: Alert and oriented to person, place and time.           Problem List Items Addressed This Visit             ICD-10-CM    Hyperlipidemia, mixed E78.2    Ischemic cardiomyopathy - Primary I25.5    Relevant Medications    nitroglycerin (Nitrostat) 0.4 mg SL tablet    Hypertension I10    S/P CABG x 4 Z95.1       Clinically euvolemic and asymptomatic from a CV perspective   BP is in acceptable range on current RX which He is tolerating well and agrees to continue          01/08/24 at 2:49 PM - TORI Gutierrez-CNP      Orders:  No orders of the defined types were placed in this encounter.        Followup Appts:  No future appointments.

## 2024-01-08 NOTE — LETTER
January 10, 2024     Michaelle Knight DO  [address]  [place] [address]    Patient: Hernán Bucio   YOB: 1960   Date of Visit: 1/8/2024       Dear Dr. Michaelle Knight DO:    Thank you for referring Hernán Bucio to me for evaluation. Below are my notes for this consultation.  If you have questions, please do not hesitate to call me. I look forward to following your patient along with you.       Sincerely,     Alyx Lopez, APRN-CNP      CC: No Recipients  ______________________________________________________________________________________    Primary Care Physician: Michaelle Knight DO  Primary Cardiologist:       Date of Visit: 01/08/2024  2:40 PM EST  Location of visit:  [address]   Type of Visit: Follow up             Chief Complaint   Patient presents with   • Follow-up     Sob , hosp follow up,        HPI / Summary:   Luis Bucio is a 63 y.o. male  with   CAD s/p CABG x4 (LIMA to LAD, radial sequential to obtuse marginal 1 and obtuse marginal 2, and saphenous vein graft to PDA.Endoscopic vein and radial artery harvest. Left atrial appendage exclusion with AtriClip 40) 7/22/2022, Dr. Maier, HTN, HLD, moderately reduced LV systolic function 45-50%, post op AFib --> NSR.    who returns for  hospital follow up     He was hospitalized with Pancreatitis, discharged 12/25/23.  He had some volume overload after IVF resuscitation which resolved after IV lasix  He overall feels well now without chest discomfort or dyspnea       12 system review is negative except as noted above         Vitals:    01/08/24 1434   Weight: 101 kg (222 lb 0.1 oz)       Medical History:   Past Medical History:   Diagnosis Date   • Hypertension    • Other specified health status     No known problems       Social History:   Tobacco Use: Medium Risk (1/8/2024)    Patient History    • Smoking Tobacco Use: Former    • Smokeless Tobacco Use: Never    • Passive Exposure: Not on file         MEDICATIONS:   Current Outpatient  Medications   Medication Instructions   • amLODIPine (NORVASC) 5 mg, oral, Daily   • aspirin (Adult Low Dose Aspirin) 81 mg EC tablet 1 tablet DAILY (route: oral)   • atorvastatin (LIPITOR) 80 mg, oral, Daily   • clopidogrel (PLAVIX) 75 mg, oral, Daily   • furosemide (LASIX) 20 mg, oral, Daily   • metoprolol tartrate (LOPRESSOR) 25 mg, oral, 2 times daily   • nitroglycerin (Nitrostat) 0.4 mg SL tablet sublingual         IMAGING REVIEWED:   Echocardiogram:   ECHOCARDIOGRAM 5/24/2023  CONCLUSIONS:  1. Left ventricular systolic function is normal with a 60-65% estimated  ejection fraction.  2. Spectral Doppler shows an impaired relaxation pattern of left ventricular  diastolic filling.  3. There is moderate mitral annular calcification.  4. RVSP within normal limits.  5. Aortic valve appears abnormal.  6. Mild to moderate aortic valve stenosis.  7. There is moderate aortic valve cusp calcification.      Cardiac Catheterization:   Adult Cath     [facility], Cath Lab, [address] [place] [address]    Cardiovascular Catheterization Report    Patient Name:     CONNOR ARIAS   Performing Physician:  [id number] Tr Malik SR, MD  Study Date:       6/30/2022       Verifying Physician:   [id number] Malik MD  MRN/PID:          [id number]        Cardiologist/Co-scrub:  Accession/Order#: [id number]       Fellow:  YOB: 1960        Fellow:  Gender:           M               Referring Physician:   TR MALIK  Admit Date:                       Referring Physician:   -  Surgeon:                          Referring Physician:   -      Study: Left Heart Catheterization      Indications:  CONNOR ARIAS SR is a 62 year old male who presents with dyslipidemia and hypertension. Acute coronary syndrome > 24 hrs, with a chest pain assessment of typical angina. Study performed as an urgent cath procedure.    Medical History:  Stress test performed:  No. CTA performed: No. [facility] accessed: No. LVEF Assessed: Yes. LVEF = 55%.    Procedure Description:  After infiltration with 2% Lidocaine, the right radial artery was cannulated with a modified Seldinger technique. Subsequently a 6 [place] sheath was placed in the right radial artery. Selective coronary catheterization was performed using a 5 Fr and 6 Fr catheter(s) exchanged over a guide wire to cannulate the coronary arteries.  Multiple injections of contrast were made into the left and right coronary arteries with angiograms recorded in multiple projections. After completion of the procedure, the arterial sheath was pulled and a TR Band Radial Compression Device was utilized to obtain patent hemostasis.    Coronary Angiography:  The coronary circulation is right dominant.    Left Main Coronary Artery:  The left main coronary artery showed no significant disease or stenosis greater than 30%.    Left Anterior Descending Coronary Artery Distribution:  The proximal to mid left anterior descending coronary artery showed 70% stenosis.    Circumflex Coronary Artery Distribution:  The mid circumflex coronary artery showed 70% stenosis. The proximal 1st obtuse marginal branch showed 100% stenosis.    Right Coronary Artery Distribution:    The distal right coronary artery showed 80% stenosis.    Coronary Interventions:    Coronary Lesion Summary:  Vessel       Stenosis    Vessel Segment  LAD        70% stenosis  proximal to mid  Circumflex 70% stenosis        mid  OM 1       100% stenosis    proximal  RCA        80% stenosis      distal         Cardiac Cath Transition of Care Summary:  Post Procedure Diagnosis: NSTEMI, multivessel CAD.  Blood Loss:               Estimated blood loss during the procedure was 5cc mls.  Specimens Removed:        Number of specimen(s) removed: none.      Recommendations:  Maximize medical therapy.  Refer to CTS for CABG  evaluation.    ____________________________________________________________________________________  CONCLUSIONS:  1. Left main: no significant angiographic disease.  2. LAD: 70% prox-mid disease.  3. LCx: 70% mid-vessel disease, 100% proximal thrombotic OM1 that fills via left to left collaterals.  4. RCA: 80% distal stenosis.  5. LVEDP 12mmHg, mild aortic stenosis on LV-Ao gradient.       [id number] Bin Malik MD  Performing Physician  Electronically signed by [id number] Bin Malik MD on 7/7/2022 at 12:16:27 PM      LABS:  CBC with Differential:    Lab Results   Component Value Date    WBC 14.7 (H) 12/25/2023    RBC 3.15 (L) 12/25/2023    HGB 12.2 (L) 12/25/2023    HCT 34.8 (L) 12/25/2023     12/25/2023     (H) 12/25/2023    MCH 38.7 (H) 12/25/2023    MCHC 35.1 12/25/2023    RDW 13.4 12/25/2023    NRBC 0.3 (H) 12/25/2023    LYMPHOPCT 12.1 12/24/2023    MONOPCT 7.3 12/24/2023    EOSPCT 0.9 12/24/2023    BASOPCT 0.4 12/24/2023    MONOSABS 1.13 (H) 12/24/2023    LYMPHSABS 1.87 12/24/2023    EOSABS 0.14 12/24/2023    BASOSABS 0.06 12/24/2023     CMP:    Lab Results   Component Value Date     (L) 12/25/2023    K 3.6 12/25/2023    CL 99 12/25/2023    CO2 27 12/25/2023    BUN 10 12/25/2023    CREATININE 1.20 12/25/2023    GLUCOSE 105 (H) 12/25/2023    PROT 5.9 (L) 12/24/2023    CALCIUM 8.2 (L) 12/25/2023    BILITOT 2.0 (H) 12/24/2023    ALKPHOS 69 12/24/2023    AST 38 12/24/2023    ALT 39 12/24/2023     BMP:    Lab Results   Component Value Date     (L) 12/25/2023    K 3.6 12/25/2023    CL 99 12/25/2023    CO2 27 12/25/2023    BUN 10 12/25/2023    CREATININE 1.20 12/25/2023    CALCIUM 8.2 (L) 12/25/2023    GLUCOSE 105 (H) 12/25/2023     Magnesium:  Lab Results   Component Value Date    MG 1.96 12/25/2023     Troponin:    Lab Results   Component Value Date    TROPHS 8 12/17/2023    TROPHS 8 12/17/2023    TROPHS CANCELED 06/11/2023    TROPHS 3 06/11/2023    TROPHS 4 06/10/2023     BNP:   Lab  Results   Component Value Date    BNP 54 06/10/2023         Lipid Panel:  Lab Results   Component Value Date    HDL 24.6 12/21/2023    CHHDL 8.3 12/21/2023    VLDL 24 12/21/2023    TRIG 119 12/21/2023    NHDL 180 (H) 12/21/2023        Lab work and imaging results independently reviewed by me     Visit Vitals  /85   Pulse 98   Wt 101 kg (222 lb 0.1 oz)   SpO2 98%   BMI 30.96 kg/m²   Smoking Status Former   BSA 2.25 m²         ECG dated 12/17/2023 independently reviewed   SR 62, LVH       Constitutional:       Appearance: Healthy appearance. Not in distress.   Eyes:      Conjunctiva/sclera: Conjunctivae normal.   Neck:      Vascular: JVD normal.   Pulmonary:      Effort: Pulmonary effort is normal.      Breath sounds: Normal breath sounds.   Cardiovascular:      PMI at left midclavicular line. Normal rate. Regular rhythm. Normal S1. Normal S2.       Murmurs: There is no murmur.      No rub.   Pulses:     Intact distal pulses.   Edema:     Peripheral edema absent.   Abdominal:      General: Bowel sounds are normal.   Musculoskeletal:      Cervical back: Neck supple. Skin:     General: Skin is warm and dry.   Neurological:      Mental Status: Alert and oriented to person, place and time.           Problem List Items Addressed This Visit             ICD-10-CM    Hyperlipidemia, mixed E78.2    Ischemic cardiomyopathy - Primary I25.5    Relevant Medications    nitroglycerin (Nitrostat) 0.4 mg SL tablet    Hypertension I10    S/P CABG x 4 Z95.1       Clinically euvolemic and asymptomatic from a CV perspective   BP is in acceptable range on current RX which He is tolerating well and agrees to continue          01/08/24 at 2:49 PM - TORI Gutierrez-CNP      Orders:  No orders of the defined types were placed in this encounter.        Followup Appts:  No future appointments.

## 2024-01-10 LAB
ATRIAL RATE: 62 BPM
P AXIS: 30 DEGREES
P OFFSET: 193 MS
P ONSET: 130 MS
PR INTERVAL: 160 MS
Q ONSET: 210 MS
QRS COUNT: 10 BEATS
QRS DURATION: 92 MS
QT INTERVAL: 440 MS
QTC CALCULATION(BAZETT): 446 MS
QTC FREDERICIA: 445 MS
R AXIS: -23 DEGREES
T AXIS: 135 DEGREES
T OFFSET: 430 MS
VENTRICULAR RATE: 62 BPM

## 2024-01-17 ENCOUNTER — HOSPITAL ENCOUNTER (EMERGENCY)
Facility: HOSPITAL | Age: 64
Discharge: HOME | End: 2024-01-17
Attending: EMERGENCY MEDICINE
Payer: MEDICAID

## 2024-01-17 VITALS
SYSTOLIC BLOOD PRESSURE: 137 MMHG | BODY MASS INDEX: 31.64 KG/M2 | OXYGEN SATURATION: 100 % | TEMPERATURE: 97.5 F | HEIGHT: 70 IN | HEART RATE: 87 BPM | RESPIRATION RATE: 17 BRPM | WEIGHT: 221 LBS | DIASTOLIC BLOOD PRESSURE: 88 MMHG

## 2024-01-17 DIAGNOSIS — K29.01 OTHER ACUTE GASTRITIS WITH HEMORRHAGE: Primary | ICD-10-CM

## 2024-01-17 LAB
ABO GROUP (TYPE) IN BLOOD: NORMAL
ALBUMIN SERPL BCP-MCNC: 4.1 G/DL (ref 3.4–5)
ALP SERPL-CCNC: 148 U/L (ref 33–136)
ALT SERPL W P-5'-P-CCNC: 38 U/L (ref 10–52)
ANION GAP SERPL CALC-SCNC: 18 MMOL/L (ref 10–20)
ANTIBODY SCREEN: NORMAL
AST SERPL W P-5'-P-CCNC: 36 U/L (ref 9–39)
BASOPHILS # BLD AUTO: 0.09 X10*3/UL (ref 0–0.1)
BASOPHILS NFR BLD AUTO: 0.8 %
BILIRUB SERPL-MCNC: 0.9 MG/DL (ref 0–1.2)
BUN SERPL-MCNC: 29 MG/DL (ref 6–23)
CALCIUM SERPL-MCNC: 10 MG/DL (ref 8.6–10.3)
CHLORIDE SERPL-SCNC: 91 MMOL/L (ref 98–107)
CO2 SERPL-SCNC: 29 MMOL/L (ref 21–32)
CREAT SERPL-MCNC: 1.96 MG/DL (ref 0.5–1.3)
EGFRCR SERPLBLD CKD-EPI 2021: 38 ML/MIN/1.73M*2
EOSINOPHIL # BLD AUTO: 0.06 X10*3/UL (ref 0–0.7)
EOSINOPHIL NFR BLD AUTO: 0.5 %
ERYTHROCYTE [DISTWIDTH] IN BLOOD BY AUTOMATED COUNT: 13.5 % (ref 11.5–14.5)
FLUAV RNA RESP QL NAA+PROBE: NOT DETECTED
FLUBV RNA RESP QL NAA+PROBE: NOT DETECTED
GLUCOSE SERPL-MCNC: 113 MG/DL (ref 74–99)
HCT VFR BLD AUTO: 46 % (ref 41–52)
HEMOCCULT SP1 STL QL: NEGATIVE
HGB BLD-MCNC: 15.8 G/DL (ref 13.5–17.5)
HOLD SPECIMEN: NORMAL
IMM GRANULOCYTES # BLD AUTO: 0.04 X10*3/UL (ref 0–0.7)
IMM GRANULOCYTES NFR BLD AUTO: 0.3 % (ref 0–0.9)
LYMPHOCYTES # BLD AUTO: 1.94 X10*3/UL (ref 1.2–4.8)
LYMPHOCYTES NFR BLD AUTO: 16.2 %
MCH RBC QN AUTO: 36.3 PG (ref 26–34)
MCHC RBC AUTO-ENTMCNC: 34.3 G/DL (ref 32–36)
MCV RBC AUTO: 106 FL (ref 80–100)
MONOCYTES # BLD AUTO: 0.79 X10*3/UL (ref 0.1–1)
MONOCYTES NFR BLD AUTO: 6.6 %
NEUTROPHILS # BLD AUTO: 9.02 X10*3/UL (ref 1.2–7.7)
NEUTROPHILS NFR BLD AUTO: 75.6 %
NRBC BLD-RTO: 0 /100 WBCS (ref 0–0)
PLATELET # BLD AUTO: 380 X10*3/UL (ref 150–450)
POTASSIUM SERPL-SCNC: 4 MMOL/L (ref 3.5–5.3)
PROT SERPL-MCNC: 8.9 G/DL (ref 6.4–8.2)
RBC # BLD AUTO: 4.35 X10*6/UL (ref 4.5–5.9)
RH FACTOR (ANTIGEN D): NORMAL
SARS-COV-2 RNA RESP QL NAA+PROBE: NOT DETECTED
SODIUM SERPL-SCNC: 134 MMOL/L (ref 136–145)
WBC # BLD AUTO: 11.9 X10*3/UL (ref 4.4–11.3)

## 2024-01-17 PROCEDURE — 96374 THER/PROPH/DIAG INJ IV PUSH: CPT

## 2024-01-17 PROCEDURE — 85025 COMPLETE CBC W/AUTO DIFF WBC: CPT | Performed by: EMERGENCY MEDICINE

## 2024-01-17 PROCEDURE — 87636 SARSCOV2 & INF A&B AMP PRB: CPT | Performed by: EMERGENCY MEDICINE

## 2024-01-17 PROCEDURE — 2500000004 HC RX 250 GENERAL PHARMACY W/ HCPCS (ALT 636 FOR OP/ED): Mod: SE | Performed by: EMERGENCY MEDICINE

## 2024-01-17 PROCEDURE — 36415 COLL VENOUS BLD VENIPUNCTURE: CPT | Performed by: EMERGENCY MEDICINE

## 2024-01-17 PROCEDURE — C9113 INJ PANTOPRAZOLE SODIUM, VIA: HCPCS | Mod: SE | Performed by: EMERGENCY MEDICINE

## 2024-01-17 PROCEDURE — 80053 COMPREHEN METABOLIC PANEL: CPT | Performed by: EMERGENCY MEDICINE

## 2024-01-17 PROCEDURE — 99284 EMERGENCY DEPT VISIT MOD MDM: CPT | Performed by: EMERGENCY MEDICINE

## 2024-01-17 PROCEDURE — 86901 BLOOD TYPING SEROLOGIC RH(D): CPT | Performed by: EMERGENCY MEDICINE

## 2024-01-17 PROCEDURE — 82270 OCCULT BLOOD FECES: CPT | Performed by: EMERGENCY MEDICINE

## 2024-01-17 RX ORDER — FAMOTIDINE 20 MG/1
20 TABLET, FILM COATED ORAL 2 TIMES DAILY
Qty: 30 TABLET | Refills: 0 | Status: SHIPPED | OUTPATIENT
Start: 2024-01-17 | End: 2024-02-09 | Stop reason: HOSPADM

## 2024-01-17 RX ORDER — ONDANSETRON 4 MG/1
4 TABLET, ORALLY DISINTEGRATING ORAL EVERY 8 HOURS PRN
Qty: 30 TABLET | Refills: 0 | Status: SHIPPED | OUTPATIENT
Start: 2024-01-17 | End: 2024-02-09 | Stop reason: HOSPADM

## 2024-01-17 RX ORDER — PANTOPRAZOLE SODIUM 40 MG/10ML
40 INJECTION, POWDER, LYOPHILIZED, FOR SOLUTION INTRAVENOUS ONCE
Status: COMPLETED | OUTPATIENT
Start: 2024-01-17 | End: 2024-01-17

## 2024-01-17 RX ADMIN — PANTOPRAZOLE SODIUM 40 MG: 40 INJECTION, POWDER, FOR SOLUTION INTRAVENOUS at 11:56

## 2024-01-17 ASSESSMENT — PAIN SCALES - GENERAL: PAINLEVEL_OUTOF10: 0 - NO PAIN

## 2024-01-17 ASSESSMENT — COLUMBIA-SUICIDE SEVERITY RATING SCALE - C-SSRS
6. HAVE YOU EVER DONE ANYTHING, STARTED TO DO ANYTHING, OR PREPARED TO DO ANYTHING TO END YOUR LIFE?: NO
2. HAVE YOU ACTUALLY HAD ANY THOUGHTS OF KILLING YOURSELF?: NO
1. IN THE PAST MONTH, HAVE YOU WISHED YOU WERE DEAD OR WISHED YOU COULD GO TO SLEEP AND NOT WAKE UP?: NO

## 2024-01-17 ASSESSMENT — PAIN - FUNCTIONAL ASSESSMENT: PAIN_FUNCTIONAL_ASSESSMENT: 0-10

## 2024-01-17 NOTE — ED PROVIDER NOTES
[facility]  ED  Provider Note  1/17/2024 11:33 AM  AC05/AC05        History of Present Illness:   Luis Bcuio is a 63 y.o. male presenting to the ED for hematemesis, beginning this morning.  The complaint has been intermittent, mild in severity, and worsened by nothing.  Patient has been sick for 2 days with nausea and vomiting.  This morning he was sitting on the toilet with being suddenly nauseous and had a small amount of blood.  He has been moving his bowels normally with no blood or melena.  He denies any easy bruising or bleeding.  He is not currently taking any blood thinners.  He was remotely on Lovenox back in April of last year.  Denies any history of peptic ulcer disease esophageal varices or alcohol abuse.      Review of Systems:   Pertinent positives and review of systems as noted above.  Remaining 10 review of systems is negative or noncontributory to today's episode of care.  Review of Systems       --------------------------------------------- PAST HISTORY ---------------------------------------------  Past Medical History:  has a past medical history of Hypertension and Other specified health status.    Past Surgical History:  has a past surgical history that includes Other surgical history (09/06/2016).    Social History:  reports that he has quit smoking. His smoking use included cigarettes. He has never used smokeless tobacco. He reports that he does not currently use alcohol. He reports current drug use. Drug: Marijuana.    Family History: family history is not on file. Unless otherwise noted, family history is non contributory    Patient's Medications   New Prescriptions    No medications on file   Previous Medications    AMLODIPINE (NORVASC) 5 MG TABLET    Take 1 tablet (5 mg) by mouth once daily.    ASPIRIN (ADULT LOW DOSE ASPIRIN) 81 MG EC TABLET    1 tablet DAILY (route: oral)    ATORVASTATIN (LIPITOR) 80 MG TABLET    Take 1 tablet (80 mg) by mouth once daily.     CLOPIDOGREL (PLAVIX) 75 MG TABLET    Take 1 tablet (75 mg) by mouth once daily.    FUROSEMIDE (LASIX) 20 MG TABLET    Take 1 tablet (20 mg) by mouth once daily. Do not start before December 26, 2023.    METOPROLOL TARTRATE (LOPRESSOR) 25 MG TABLET    take 1 tablet by mouth twice a day    NITROGLYCERIN (NITROSTAT) 0.4 MG SL TABLET    Place under the tongue.   Modified Medications    No medications on file   Discontinued Medications    No medications on file      The patient’s home medications have been reviewed.    Allergies: Patient has no known allergies.    -------------------------------------------------- RESULTS -------------------------------------------------  All laboratory and radiology results have been personally reviewed by myself   LABS:  Labs Reviewed   COMPREHENSIVE METABOLIC PANEL - Abnormal       Result Value    Glucose 113 (*)     Sodium 134 (*)     Potassium 4.0      Chloride 91 (*)     Bicarbonate 29      Anion Gap 18      Urea Nitrogen 29 (*)     Creatinine 1.96 (*)     eGFR 38 (*)     Calcium 10.0      Albumin 4.1      Alkaline Phosphatase 148 (*)     Total Protein 8.9 (*)     AST 36      Bilirubin, Total 0.9      ALT 38     CBC WITH AUTO DIFFERENTIAL - Abnormal    WBC 11.9 (*)     nRBC 0.0      RBC 4.35 (*)     Hemoglobin 15.8      Hematocrit 46.0       (*)     MCH 36.3 (*)     MCHC 34.3      RDW 13.5      Platelets 380      Neutrophils % 75.6      Immature Granulocytes %, Automated 0.3      Lymphocytes % 16.2      Monocytes % 6.6      Eosinophils % 0.5      Basophils % 0.8      Neutrophils Absolute 9.02 (*)     Immature Granulocytes Absolute, Automated 0.04      Lymphocytes Absolute 1.94      Monocytes Absolute 0.79      Eosinophils Absolute 0.06      Basophils Absolute 0.09     OCCULT BLOOD X1, STOOL - Normal    Occult Blood, Stool X1 Negative     SARS-COV-2 AND INFLUENZA A/B PCR - Normal    Flu A Result Not Detected      Flu B Result Not Detected      Coronavirus 2019, PCR Not  "Detected      Narrative:     This assay has received FDA Emergency Use Authorization (EUA) and  is only authorized for the duration of time that circumstances exist to justify the authorization of the emergency use of in vitro diagnostic tests for the detection of SARS-CoV-2 virus and/or diagnosis of COVID-19 infection under section 564(b)(1) of the Act, 21 U.S.C. 360bbb-3(b)(1). Testing for SARS-CoV-2 is only recommended for patients who meet current clinical and/or epidemiological criteria as defined by federal, state, or local public health directives. This assay is an in vitro diagnostic nucleic acid amplification test for the qualitative detection of SARS-CoV-2, Influenza A, and Influenza B from nasopharyngeal specimens and has been validated for use at [facility]. Negative results do not preclude COVID-19 infections or Influenza A/B infections, and should not be used as the sole basis for diagnosis, treatment, or other management decisions. If Influenza A/B and RSV PCR results are negative, testing for Parainfluenza virus, Adenovirus and Metapneumovirus is routinely performed for [facility] [place] oncology and intensive care inpatients, and is available on other patients by placing an add-on request.    GRAY TOP   TYPE AND SCREEN     EKG: Sinus rhythm at 70 bpm, left axis deviation anterolateral ST depressions, prolonged QT interval corrected of 409 ms, abnormal EKG, no acute ST elevations.  Interpreted by CATRACHO Mead MD    RADIOLOGY:  Interpreted by Radiologist.  No orders to display         ------------------------- NURSING NOTES AND VITALS REVIEWED ---------------------------   The nursing notes within the ED encounter and vital signs as below have been reviewed.   Pulse 91   Temp 36.4 °C (97.5 °F) (Temporal)   Resp 16   Ht 1.778 m (5' 10\")   Wt 100 kg (221 lb)   SpO2 100%   BMI 31.71 kg/m²   Oxygen Saturation Interpretation: " Normal      ---------------------------------------------------PHYSICAL EXAM--------------------------------------  Physical Exam   Constitutional/General: Alert and oriented x3, well appearing, non toxic in NAD  Head: Normocephalic and atraumatic  Eyes: PERRL, EOMI, conjunctiva normal, sclera non icteric  Mouth: Oropharynx clear, handling secretions, no trismus, no asymmetry of the posterior oropharynx or uvular edema  Neck: Supple, full ROM, non tender to palpation in the midline, no stridor, no crepitus, no meningeal signs  Respiratory: Lungs clear to auscultation bilaterally, no wheezes, rales, or rhonchi. Not in respiratory distress  Cardiovascular:  Regular rate. Regular rhythm. No murmurs, gallops, or rubs. 2+ distal pulses  Chest: No chest wall tenderness  GI:  Abdomen Soft, Non tender, Non distended.  +BS. No organomegaly, no palpable masses,  No rebound, guarding, or rigidity.  Rectal exam reveals nontender reveals light brown stool with no evidence of bleeding.  Guaiac is negative.  Musculoskeletal: Moves all extremities x 4. Warm and well perfused, no clubbing, cyanosis, or edema. Capillary refill <3 seconds  Integument: skin warm and dry. No rashes.   Lymphatic: no lymphadenopathy noted  Neurologic: GCS 15, no focal deficits, symmetric strength 5/5 in the upper and lower extremities bilaterally  Psychiatric: Normal Affect    Procedures    ------------------------------ ED COURSE/MEDICAL DECISION MAKING----------------------    Medical Decision Making:   Patient had nasal episode of hematemesis with vomiting.  He describes a scant amount about the size of a quarter.  He has no dark or bloody stools and his rectal exam is heme-negative.  His CBC is unremarkable.  His flu and COVID testing are negative.  His electrolytes show glucose of 113 BUN of 29 and a creatinine of 1.96 with GFR of 38.  He has no easy bruising or bleeding and is not taking blood thinners.  He is stable for outpatient management with 1  eyes episode of small amount of blood in his vomitus.  We discharged home on Pepcid twice daily and advised follow-up with his primary care doctor or GI later this week or early next week.  Diagnoses as of 01/17/24 1257   Other acute gastritis with hemorrhage      Counseling:   The emergency provider has spoken with the patient and discussed today’s results, in addition to providing specific details for the plan of care and counseling regarding the diagnosis and prognosis.  Questions are answered at this time and they are agreeable with the plan.      --------------------------------- IMPRESSION AND DISPOSITION ---------------------------------        IMPRESSION  1. Other acute gastritis with hemorrhage        DISPOSITION  Disposition: Discharge to home0  Patient condition is fair      Billing Provider Critical Care Time: 0 minutes     Bobby Mead MD  01/17/24 2093

## 2024-01-17 NOTE — DISCHARGE INSTRUCTIONS
Pepcid and Zofran as prescribed for bleeding and nausea.    Call your doctor for outpatient follow-up.    The GI office will call you to set up appoint with a gastroenterologist.    Return for worsening symptoms or concerns.

## 2024-01-17 NOTE — ED TRIAGE NOTES
Pt to ED c/o vomiting blood. Pt states he has been feeling nauseated and having a poor appetite for the past few days. Pt states today he began to vomit and noticed he had some bright red blood come out from his mouth and nose. Pt states he has been feeling weak. Pt states he also has been dizzy.

## 2024-01-18 ENCOUNTER — HOSPITAL ENCOUNTER (OUTPATIENT)
Dept: CARDIOLOGY | Facility: HOSPITAL | Age: 64
Discharge: HOME | End: 2024-01-18
Payer: MEDICAID

## 2024-01-18 LAB
ATRIAL RATE: 78 BPM
P AXIS: 72 DEGREES
P OFFSET: 211 MS
P ONSET: 144 MS
PR INTERVAL: 160 MS
Q ONSET: 224 MS
QRS COUNT: 13 BEATS
QRS DURATION: 96 MS
QT INTERVAL: 438 MS
QTC CALCULATION(BAZETT): 499 MS
QTC FREDERICIA: 478 MS
R AXIS: -38 DEGREES
T AXIS: 140 DEGREES
T OFFSET: 443 MS
VENTRICULAR RATE: 78 BPM

## 2024-01-18 PROCEDURE — 93005 ELECTROCARDIOGRAM TRACING: CPT

## 2024-01-24 ENCOUNTER — LAB (OUTPATIENT)
Dept: LAB | Facility: LAB | Age: 64
End: 2024-01-24
Payer: MEDICAID

## 2024-01-24 DIAGNOSIS — K92.0 HEMATEMESIS: Primary | ICD-10-CM

## 2024-01-24 LAB
ALBUMIN SERPL BCP-MCNC: 4.1 G/DL (ref 3.4–5)
ALP SERPL-CCNC: 130 U/L (ref 33–136)
ALT SERPL W P-5'-P-CCNC: 35 U/L (ref 10–52)
ANION GAP SERPL CALC-SCNC: 15 MMOL/L (ref 10–20)
AST SERPL W P-5'-P-CCNC: 36 U/L (ref 9–39)
BASOPHILS # BLD AUTO: 0.07 X10*3/UL (ref 0–0.1)
BASOPHILS NFR BLD AUTO: 0.6 %
BILIRUB SERPL-MCNC: 1.2 MG/DL (ref 0–1.2)
BUN SERPL-MCNC: 20 MG/DL (ref 6–23)
CALCIUM SERPL-MCNC: 10 MG/DL (ref 8.6–10.3)
CHLORIDE SERPL-SCNC: 88 MMOL/L (ref 98–107)
CO2 SERPL-SCNC: 34 MMOL/L (ref 21–32)
CREAT SERPL-MCNC: 2.04 MG/DL (ref 0.5–1.3)
EGFRCR SERPLBLD CKD-EPI 2021: 36 ML/MIN/1.73M*2
EOSINOPHIL # BLD AUTO: 0.11 X10*3/UL (ref 0–0.7)
EOSINOPHIL NFR BLD AUTO: 1 %
ERYTHROCYTE [DISTWIDTH] IN BLOOD BY AUTOMATED COUNT: 13.4 % (ref 11.5–14.5)
GLUCOSE SERPL-MCNC: 113 MG/DL (ref 74–99)
HCT VFR BLD AUTO: 46 % (ref 41–52)
HGB BLD-MCNC: 15.7 G/DL (ref 13.5–17.5)
IMM GRANULOCYTES # BLD AUTO: 0.03 X10*3/UL (ref 0–0.7)
IMM GRANULOCYTES NFR BLD AUTO: 0.3 % (ref 0–0.9)
LYMPHOCYTES # BLD AUTO: 2.48 X10*3/UL (ref 1.2–4.8)
LYMPHOCYTES NFR BLD AUTO: 22.2 %
MCH RBC QN AUTO: 35.5 PG (ref 26–34)
MCHC RBC AUTO-ENTMCNC: 34.1 G/DL (ref 32–36)
MCV RBC AUTO: 104 FL (ref 80–100)
MONOCYTES # BLD AUTO: 0.84 X10*3/UL (ref 0.1–1)
MONOCYTES NFR BLD AUTO: 7.5 %
NEUTROPHILS # BLD AUTO: 7.65 X10*3/UL (ref 1.2–7.7)
NEUTROPHILS NFR BLD AUTO: 68.4 %
NRBC BLD-RTO: 0 /100 WBCS (ref 0–0)
PLATELET # BLD AUTO: 333 X10*3/UL (ref 150–450)
POTASSIUM SERPL-SCNC: 4.4 MMOL/L (ref 3.5–5.3)
PROT SERPL-MCNC: 8.2 G/DL (ref 6.4–8.2)
RBC # BLD AUTO: 4.42 X10*6/UL (ref 4.5–5.9)
SODIUM SERPL-SCNC: 133 MMOL/L (ref 136–145)
WBC # BLD AUTO: 11.2 X10*3/UL (ref 4.4–11.3)

## 2024-01-24 PROCEDURE — 36415 COLL VENOUS BLD VENIPUNCTURE: CPT

## 2024-01-24 PROCEDURE — 80053 COMPREHEN METABOLIC PANEL: CPT

## 2024-01-24 PROCEDURE — 85025 COMPLETE CBC W/AUTO DIFF WBC: CPT

## 2024-02-06 ENCOUNTER — APPOINTMENT (OUTPATIENT)
Dept: RADIOLOGY | Facility: HOSPITAL | Age: 64
End: 2024-02-06
Payer: MEDICAID

## 2024-02-06 ENCOUNTER — APPOINTMENT (OUTPATIENT)
Dept: CARDIOLOGY | Facility: HOSPITAL | Age: 64
End: 2024-02-06
Payer: MEDICAID

## 2024-02-06 ENCOUNTER — HOSPITAL ENCOUNTER (OUTPATIENT)
Facility: HOSPITAL | Age: 64
Setting detail: OBSERVATION
LOS: 1 days | Discharge: HOME | End: 2024-02-09
Attending: FAMILY MEDICINE | Admitting: STUDENT IN AN ORGANIZED HEALTH CARE EDUCATION/TRAINING PROGRAM
Payer: MEDICAID

## 2024-02-06 DIAGNOSIS — R11.2 NAUSEA AND VOMITING, UNSPECIFIED VOMITING TYPE: ICD-10-CM

## 2024-02-06 DIAGNOSIS — I25.5 ISCHEMIC CARDIOMYOPATHY: ICD-10-CM

## 2024-02-06 DIAGNOSIS — I25.110: ICD-10-CM

## 2024-02-06 DIAGNOSIS — I50.9 HEART FAILURE, UNSPECIFIED HF CHRONICITY, UNSPECIFIED HEART FAILURE TYPE (MULTI): ICD-10-CM

## 2024-02-06 DIAGNOSIS — I10 PRIMARY HYPERTENSION: ICD-10-CM

## 2024-02-06 DIAGNOSIS — R53.1 GENERALIZED WEAKNESS: ICD-10-CM

## 2024-02-06 DIAGNOSIS — K21.9 GASTROESOPHAGEAL REFLUX DISEASE, UNSPECIFIED WHETHER ESOPHAGITIS PRESENT: ICD-10-CM

## 2024-02-06 DIAGNOSIS — Z95.1 S/P CABG X 4: ICD-10-CM

## 2024-02-06 DIAGNOSIS — R63.4 UNINTENTIONAL WEIGHT LOSS: ICD-10-CM

## 2024-02-06 DIAGNOSIS — K85.90 RECURRENT ACUTE PANCREATITIS (HHS-HCC): Primary | ICD-10-CM

## 2024-02-06 LAB
ALBUMIN SERPL BCP-MCNC: 4.1 G/DL (ref 3.4–5)
ALP SERPL-CCNC: 142 U/L (ref 33–136)
ALT SERPL W P-5'-P-CCNC: 67 U/L (ref 10–52)
AMMONIA PLAS-SCNC: 25 UMOL/L (ref 16–53)
AMPHETAMINES UR QL SCN: ABNORMAL
ANION GAP SERPL CALC-SCNC: 20 MMOL/L (ref 10–20)
APPEARANCE UR: ABNORMAL
AST SERPL W P-5'-P-CCNC: 74 U/L (ref 9–39)
BACTERIA #/AREA URNS AUTO: ABNORMAL /HPF
BARBITURATES UR QL SCN: ABNORMAL
BASOPHILS # BLD AUTO: 0.07 X10*3/UL (ref 0–0.1)
BASOPHILS NFR BLD AUTO: 0.7 %
BENZODIAZ UR QL SCN: ABNORMAL
BILIRUB SERPL-MCNC: 1.3 MG/DL (ref 0–1.2)
BILIRUB UR STRIP.AUTO-MCNC: NEGATIVE MG/DL
BUN SERPL-MCNC: 30 MG/DL (ref 6–23)
BZE UR QL SCN: ABNORMAL
CALCIUM SERPL-MCNC: 9.6 MG/DL (ref 8.6–10.3)
CANNABINOIDS UR QL SCN: ABNORMAL
CARDIAC TROPONIN I PNL SERPL HS: 18 NG/L (ref 0–20)
CARDIAC TROPONIN I PNL SERPL HS: 19 NG/L (ref 0–20)
CHLORIDE SERPL-SCNC: 83 MMOL/L (ref 98–107)
CO2 SERPL-SCNC: 30 MMOL/L (ref 21–32)
COLOR UR: ABNORMAL
CREAT SERPL-MCNC: 2.05 MG/DL (ref 0.5–1.3)
CRP SERPL-MCNC: 6.49 MG/DL
EGFRCR SERPLBLD CKD-EPI 2021: 36 ML/MIN/1.73M*2
EOSINOPHIL # BLD AUTO: 0.18 X10*3/UL (ref 0–0.7)
EOSINOPHIL NFR BLD AUTO: 1.7 %
ERYTHROCYTE [DISTWIDTH] IN BLOOD BY AUTOMATED COUNT: 13.8 % (ref 11.5–14.5)
ERYTHROCYTE [SEDIMENTATION RATE] IN BLOOD BY WESTERGREN METHOD: 50 MM/H (ref 0–20)
ETHANOL SERPL-MCNC: <10 MG/DL
FENTANYL+NORFENTANYL UR QL SCN: ABNORMAL
FLUAV RNA RESP QL NAA+PROBE: NOT DETECTED
FLUBV RNA RESP QL NAA+PROBE: NOT DETECTED
GLUCOSE SERPL-MCNC: 93 MG/DL (ref 74–99)
GLUCOSE UR STRIP.AUTO-MCNC: NEGATIVE MG/DL
HCT VFR BLD AUTO: 49.3 % (ref 41–52)
HGB BLD-MCNC: 16.6 G/DL (ref 13.5–17.5)
HOLD SPECIMEN: NORMAL
HYALINE CASTS #/AREA URNS AUTO: ABNORMAL /LPF
IMM GRANULOCYTES # BLD AUTO: 0.02 X10*3/UL (ref 0–0.7)
IMM GRANULOCYTES NFR BLD AUTO: 0.2 % (ref 0–0.9)
KETONES UR STRIP.AUTO-MCNC: ABNORMAL MG/DL
LACTATE SERPL-SCNC: 1.4 MMOL/L (ref 0.4–2)
LEUKOCYTE ESTERASE UR QL STRIP.AUTO: NEGATIVE
LIPASE SERPL-CCNC: 138 U/L (ref 9–82)
LYMPHOCYTES # BLD AUTO: 2.08 X10*3/UL (ref 1.2–4.8)
LYMPHOCYTES NFR BLD AUTO: 19.8 %
MAGNESIUM SERPL-MCNC: 2.53 MG/DL (ref 1.6–2.4)
MCH RBC QN AUTO: 34.8 PG (ref 26–34)
MCHC RBC AUTO-ENTMCNC: 33.7 G/DL (ref 32–36)
MCV RBC AUTO: 103 FL (ref 80–100)
MONOCYTES # BLD AUTO: 1.06 X10*3/UL (ref 0.1–1)
MONOCYTES NFR BLD AUTO: 10.1 %
MUCOUS THREADS #/AREA URNS AUTO: ABNORMAL /LPF
NEUTROPHILS # BLD AUTO: 7.07 X10*3/UL (ref 1.2–7.7)
NEUTROPHILS NFR BLD AUTO: 67.5 %
NITRITE UR QL STRIP.AUTO: NEGATIVE
NRBC BLD-RTO: 0 /100 WBCS (ref 0–0)
OPIATES UR QL SCN: ABNORMAL
OXYCODONE+OXYMORPHONE UR QL SCN: ABNORMAL
PCP UR QL SCN: ABNORMAL
PH UR STRIP.AUTO: 5 [PH]
PLATELET # BLD AUTO: 169 X10*3/UL (ref 150–450)
POTASSIUM SERPL-SCNC: 3.2 MMOL/L (ref 3.5–5.3)
PROT SERPL-MCNC: 8.1 G/DL (ref 6.4–8.2)
PROT UR STRIP.AUTO-MCNC: ABNORMAL MG/DL
RBC # BLD AUTO: 4.77 X10*6/UL (ref 4.5–5.9)
RBC # UR STRIP.AUTO: ABNORMAL /UL
RBC #/AREA URNS AUTO: ABNORMAL /HPF
RBC CASTS #/AREA UR COMP ASSIST: ABNORMAL /LPF
SARS-COV-2 RNA RESP QL NAA+PROBE: NOT DETECTED
SODIUM SERPL-SCNC: 130 MMOL/L (ref 136–145)
SP GR UR STRIP.AUTO: 1.02
SQUAMOUS #/AREA URNS AUTO: ABNORMAL /HPF
TSH SERPL-ACNC: 1.78 MIU/L (ref 0.44–3.98)
UROBILINOGEN UR STRIP.AUTO-MCNC: 2 MG/DL
WBC # BLD AUTO: 10.5 X10*3/UL (ref 4.4–11.3)
WBC #/AREA URNS AUTO: ABNORMAL /HPF

## 2024-02-06 PROCEDURE — 71045 X-RAY EXAM CHEST 1 VIEW: CPT | Mod: FOREIGN READ | Performed by: RADIOLOGY

## 2024-02-06 PROCEDURE — 84484 ASSAY OF TROPONIN QUANT: CPT | Performed by: FAMILY MEDICINE

## 2024-02-06 PROCEDURE — 84443 ASSAY THYROID STIM HORMONE: CPT | Performed by: STUDENT IN AN ORGANIZED HEALTH CARE EDUCATION/TRAINING PROGRAM

## 2024-02-06 PROCEDURE — 70450 CT HEAD/BRAIN W/O DYE: CPT

## 2024-02-06 PROCEDURE — G0378 HOSPITAL OBSERVATION PER HR: HCPCS

## 2024-02-06 PROCEDURE — 85652 RBC SED RATE AUTOMATED: CPT | Performed by: STUDENT IN AN ORGANIZED HEALTH CARE EDUCATION/TRAINING PROGRAM

## 2024-02-06 PROCEDURE — 74176 CT ABD & PELVIS W/O CONTRAST: CPT | Mod: FR

## 2024-02-06 PROCEDURE — 80307 DRUG TEST PRSMV CHEM ANLYZR: CPT | Mod: IPSPLIT | Performed by: FAMILY MEDICINE

## 2024-02-06 PROCEDURE — C9113 INJ PANTOPRAZOLE SODIUM, VIA: HCPCS | Mod: IPSPLIT | Performed by: STUDENT IN AN ORGANIZED HEALTH CARE EDUCATION/TRAINING PROGRAM

## 2024-02-06 PROCEDURE — 82077 ASSAY SPEC XCP UR&BREATH IA: CPT | Performed by: FAMILY MEDICINE

## 2024-02-06 PROCEDURE — 83690 ASSAY OF LIPASE: CPT | Performed by: FAMILY MEDICINE

## 2024-02-06 PROCEDURE — 99285 EMERGENCY DEPT VISIT HI MDM: CPT | Mod: 25 | Performed by: FAMILY MEDICINE

## 2024-02-06 PROCEDURE — 80053 COMPREHEN METABOLIC PANEL: CPT | Performed by: FAMILY MEDICINE

## 2024-02-06 PROCEDURE — 83605 ASSAY OF LACTIC ACID: CPT | Performed by: FAMILY MEDICINE

## 2024-02-06 PROCEDURE — 93005 ELECTROCARDIOGRAM TRACING: CPT | Mod: 76

## 2024-02-06 PROCEDURE — 87636 SARSCOV2 & INF A&B AMP PRB: CPT | Performed by: FAMILY MEDICINE

## 2024-02-06 PROCEDURE — 93005 ELECTROCARDIOGRAM TRACING: CPT

## 2024-02-06 PROCEDURE — 82140 ASSAY OF AMMONIA: CPT | Performed by: FAMILY MEDICINE

## 2024-02-06 PROCEDURE — 81001 URINALYSIS AUTO W/SCOPE: CPT | Performed by: FAMILY MEDICINE

## 2024-02-06 PROCEDURE — 71045 X-RAY EXAM CHEST 1 VIEW: CPT | Mod: FR

## 2024-02-06 PROCEDURE — 83735 ASSAY OF MAGNESIUM: CPT | Performed by: FAMILY MEDICINE

## 2024-02-06 PROCEDURE — 1100000001 HC PRIVATE ROOM DAILY: Mod: IPSPLIT

## 2024-02-06 PROCEDURE — 36415 COLL VENOUS BLD VENIPUNCTURE: CPT | Performed by: FAMILY MEDICINE

## 2024-02-06 PROCEDURE — 74176 CT ABD & PELVIS W/O CONTRAST: CPT | Mod: FOREIGN READ | Performed by: RADIOLOGY

## 2024-02-06 PROCEDURE — 2500000004 HC RX 250 GENERAL PHARMACY W/ HCPCS (ALT 636 FOR OP/ED): Mod: SE | Performed by: FAMILY MEDICINE

## 2024-02-06 PROCEDURE — 70450 CT HEAD/BRAIN W/O DYE: CPT | Performed by: RADIOLOGY

## 2024-02-06 PROCEDURE — 2500000004 HC RX 250 GENERAL PHARMACY W/ HCPCS (ALT 636 FOR OP/ED): Mod: IPSPLIT | Performed by: STUDENT IN AN ORGANIZED HEALTH CARE EDUCATION/TRAINING PROGRAM

## 2024-02-06 PROCEDURE — 2500000004 HC RX 250 GENERAL PHARMACY W/ HCPCS (ALT 636 FOR OP/ED): Mod: SE

## 2024-02-06 PROCEDURE — 86140 C-REACTIVE PROTEIN: CPT | Performed by: STUDENT IN AN ORGANIZED HEALTH CARE EDUCATION/TRAINING PROGRAM

## 2024-02-06 PROCEDURE — 85025 COMPLETE CBC W/AUTO DIFF WBC: CPT | Performed by: FAMILY MEDICINE

## 2024-02-06 RX ORDER — POLYETHYLENE GLYCOL 3350 17 G/17G
17 POWDER, FOR SOLUTION ORAL DAILY
Status: DISCONTINUED | OUTPATIENT
Start: 2024-02-06 | End: 2024-02-09 | Stop reason: HOSPADM

## 2024-02-06 RX ORDER — SODIUM CHLORIDE, SODIUM LACTATE, POTASSIUM CHLORIDE, CALCIUM CHLORIDE 600; 310; 30; 20 MG/100ML; MG/100ML; MG/100ML; MG/100ML
125 INJECTION, SOLUTION INTRAVENOUS CONTINUOUS
Status: DISCONTINUED | OUTPATIENT
Start: 2024-02-06 | End: 2024-02-09 | Stop reason: HOSPADM

## 2024-02-06 RX ORDER — TALC
3 POWDER (GRAM) TOPICAL DAILY
Status: DISCONTINUED | OUTPATIENT
Start: 2024-02-06 | End: 2024-02-09 | Stop reason: HOSPADM

## 2024-02-06 RX ORDER — ONDANSETRON HYDROCHLORIDE 2 MG/ML
4 INJECTION, SOLUTION INTRAVENOUS ONCE
Status: COMPLETED | OUTPATIENT
Start: 2024-02-06 | End: 2024-02-06

## 2024-02-06 RX ORDER — PANTOPRAZOLE SODIUM 40 MG/10ML
40 INJECTION, POWDER, LYOPHILIZED, FOR SOLUTION INTRAVENOUS 2 TIMES DAILY
Status: DISCONTINUED | OUTPATIENT
Start: 2024-02-06 | End: 2024-02-09 | Stop reason: HOSPADM

## 2024-02-06 RX ORDER — MORPHINE SULFATE 2 MG/ML
2 INJECTION, SOLUTION INTRAMUSCULAR; INTRAVENOUS
Status: DISCONTINUED | OUTPATIENT
Start: 2024-02-06 | End: 2024-02-09 | Stop reason: HOSPADM

## 2024-02-06 RX ORDER — ONDANSETRON HYDROCHLORIDE 2 MG/ML
4 INJECTION, SOLUTION INTRAVENOUS EVERY 8 HOURS PRN
Status: DISCONTINUED | OUTPATIENT
Start: 2024-02-06 | End: 2024-02-07

## 2024-02-06 RX ORDER — ONDANSETRON HYDROCHLORIDE 2 MG/ML
INJECTION, SOLUTION INTRAVENOUS
Status: COMPLETED
Start: 2024-02-06 | End: 2024-02-06

## 2024-02-06 RX ADMIN — SODIUM CHLORIDE, POTASSIUM CHLORIDE, SODIUM LACTATE AND CALCIUM CHLORIDE 125 ML/HR: 600; 310; 30; 20 INJECTION, SOLUTION INTRAVENOUS at 20:27

## 2024-02-06 RX ADMIN — PANTOPRAZOLE SODIUM 40 MG: 40 INJECTION, POWDER, FOR SOLUTION INTRAVENOUS at 20:27

## 2024-02-06 RX ADMIN — SODIUM CHLORIDE 500 ML: 9 INJECTION, SOLUTION INTRAVENOUS at 17:27

## 2024-02-06 RX ADMIN — ONDANSETRON HYDROCHLORIDE 4 MG: 2 INJECTION, SOLUTION INTRAVENOUS at 19:35

## 2024-02-06 RX ADMIN — ONDANSETRON 4 MG: 2 INJECTION, SOLUTION INTRAMUSCULAR; INTRAVENOUS at 19:35

## 2024-02-06 SDOH — SOCIAL STABILITY: SOCIAL INSECURITY: ARE YOU OR HAVE YOU BEEN THREATENED OR ABUSED PHYSICALLY, EMOTIONALLY, OR SEXUALLY BY ANYONE?: NO

## 2024-02-06 SDOH — SOCIAL STABILITY: SOCIAL INSECURITY: DOES ANYONE TRY TO KEEP YOU FROM HAVING/CONTACTING OTHER FRIENDS OR DOING THINGS OUTSIDE YOUR HOME?: NO

## 2024-02-06 SDOH — SOCIAL STABILITY: SOCIAL INSECURITY: DO YOU FEEL ANYONE HAS EXPLOITED OR TAKEN ADVANTAGE OF YOU FINANCIALLY OR OF YOUR PERSONAL PROPERTY?: NO

## 2024-02-06 SDOH — SOCIAL STABILITY: SOCIAL INSECURITY: ARE THERE ANY APPARENT SIGNS OF INJURIES/BEHAVIORS THAT COULD BE RELATED TO ABUSE/NEGLECT?: NO

## 2024-02-06 SDOH — SOCIAL STABILITY: SOCIAL INSECURITY: DO YOU FEEL UNSAFE GOING BACK TO THE PLACE WHERE YOU ARE LIVING?: NO

## 2024-02-06 SDOH — SOCIAL STABILITY: SOCIAL INSECURITY: ABUSE: ADULT

## 2024-02-06 SDOH — SOCIAL STABILITY: SOCIAL INSECURITY: WERE YOU ABLE TO COMPLETE ALL THE BEHAVIORAL HEALTH SCREENINGS?: YES

## 2024-02-06 SDOH — SOCIAL STABILITY: SOCIAL INSECURITY: HAS ANYONE EVER THREATENED TO HURT YOUR FAMILY OR YOUR PETS?: NO

## 2024-02-06 SDOH — SOCIAL STABILITY: SOCIAL INSECURITY: HAVE YOU HAD THOUGHTS OF HARMING ANYONE ELSE?: NO

## 2024-02-06 ASSESSMENT — PAIN - FUNCTIONAL ASSESSMENT: PAIN_FUNCTIONAL_ASSESSMENT: 0-10

## 2024-02-06 ASSESSMENT — LIFESTYLE VARIABLES
HOW MANY STANDARD DRINKS CONTAINING ALCOHOL DO YOU HAVE ON A TYPICAL DAY: PATIENT DOES NOT DRINK
AUDIT-C TOTAL SCORE: 0
SKIP TO QUESTIONS 9-10: 1
AUDIT-C TOTAL SCORE: 0
HOW OFTEN DO YOU HAVE 6 OR MORE DRINKS ON ONE OCCASION: NEVER
HOW OFTEN DO YOU HAVE A DRINK CONTAINING ALCOHOL: NEVER

## 2024-02-06 ASSESSMENT — ACTIVITIES OF DAILY LIVING (ADL)
JUDGMENT_ADEQUATE_SAFELY_COMPLETE_DAILY_ACTIVITIES: YES
LACK_OF_TRANSPORTATION: NO
HEARING - RIGHT EAR: FUNCTIONAL
BATHING: INDEPENDENT
PATIENT'S MEMORY ADEQUATE TO SAFELY COMPLETE DAILY ACTIVITIES?: YES
DRESSING YOURSELF: INDEPENDENT
TOILETING: INDEPENDENT
HEARING - LEFT EAR: FUNCTIONAL
WALKS IN HOME: INDEPENDENT
GROOMING: INDEPENDENT
ADEQUATE_TO_COMPLETE_ADL: YES
FEEDING YOURSELF: INDEPENDENT

## 2024-02-06 ASSESSMENT — COGNITIVE AND FUNCTIONAL STATUS - GENERAL
DAILY ACTIVITIY SCORE: 24
MOBILITY SCORE: 24
PATIENT BASELINE BEDBOUND: NO

## 2024-02-06 ASSESSMENT — PAIN SCALES - GENERAL
PAINLEVEL_OUTOF10: 0 - NO PAIN

## 2024-02-07 LAB
ALBUMIN SERPL BCP-MCNC: 3.5 G/DL (ref 3.4–5)
ALP SERPL-CCNC: 119 U/L (ref 33–136)
ALT SERPL W P-5'-P-CCNC: 53 U/L (ref 10–52)
AST SERPL W P-5'-P-CCNC: 62 U/L (ref 9–39)
ATRIAL RATE: 67 BPM
ATRIAL RATE: 72 BPM
ATRIAL RATE: 73 BPM
BILIRUB DIRECT SERPL-MCNC: 0.3 MG/DL (ref 0–0.3)
BILIRUB SERPL-MCNC: 1.1 MG/DL (ref 0–1.2)
ERYTHROCYTE [DISTWIDTH] IN BLOOD BY AUTOMATED COUNT: 13.9 % (ref 11.5–14.5)
HCT VFR BLD AUTO: 39.9 % (ref 41–52)
HGB BLD-MCNC: 13.8 G/DL (ref 13.5–17.5)
HOLD SPECIMEN: NORMAL
MAGNESIUM SERPL-MCNC: 2.4 MG/DL (ref 1.6–2.4)
MCH RBC QN AUTO: 34.7 PG (ref 26–34)
MCHC RBC AUTO-ENTMCNC: 34.6 G/DL (ref 32–36)
MCV RBC AUTO: 100 FL (ref 80–100)
NRBC BLD-RTO: 0 /100 WBCS (ref 0–0)
P AXIS: -12 DEGREES
P AXIS: -21 DEGREES
P AXIS: 21 DEGREES
P OFFSET: 195 MS
P OFFSET: 198 MS
P OFFSET: 204 MS
P ONSET: 144 MS
P ONSET: 149 MS
P ONSET: 149 MS
PLATELET # BLD AUTO: 166 X10*3/UL (ref 150–450)
PR INTERVAL: 122 MS
PR INTERVAL: 126 MS
PR INTERVAL: 130 MS
PROT SERPL-MCNC: 7.4 G/DL (ref 6.4–8.2)
Q ONSET: 209 MS
Q ONSET: 210 MS
Q ONSET: 212 MS
QRS COUNT: 11 BEATS
QRS COUNT: 12 BEATS
QRS COUNT: 12 BEATS
QRS DURATION: 100 MS
QRS DURATION: 100 MS
QRS DURATION: 102 MS
QT INTERVAL: 452 MS
QT INTERVAL: 454 MS
QT INTERVAL: 472 MS
QTC CALCULATION(BAZETT): 494 MS
QTC CALCULATION(BAZETT): 498 MS
QTC CALCULATION(BAZETT): 500 MS
QTC FREDERICIA: 480 MS
QTC FREDERICIA: 485 MS
QTC FREDERICIA: 490 MS
R AXIS: -19 DEGREES
R AXIS: -21 DEGREES
R AXIS: -35 DEGREES
RBC # BLD AUTO: 3.98 X10*6/UL (ref 4.5–5.9)
T AXIS: 197 DEGREES
T AXIS: 201 DEGREES
T AXIS: 252 DEGREES
T OFFSET: 435 MS
T OFFSET: 437 MS
T OFFSET: 448 MS
VENTRICULAR RATE: 67 BPM
VENTRICULAR RATE: 72 BPM
VENTRICULAR RATE: 73 BPM
WBC # BLD AUTO: 9.9 X10*3/UL (ref 4.4–11.3)

## 2024-02-07 PROCEDURE — 99222 1ST HOSP IP/OBS MODERATE 55: CPT | Performed by: STUDENT IN AN ORGANIZED HEALTH CARE EDUCATION/TRAINING PROGRAM

## 2024-02-07 PROCEDURE — 2500000004 HC RX 250 GENERAL PHARMACY W/ HCPCS (ALT 636 FOR OP/ED): Mod: IPSPLIT | Performed by: STUDENT IN AN ORGANIZED HEALTH CARE EDUCATION/TRAINING PROGRAM

## 2024-02-07 PROCEDURE — 80076 HEPATIC FUNCTION PANEL: CPT | Mod: IPSPLIT | Performed by: STUDENT IN AN ORGANIZED HEALTH CARE EDUCATION/TRAINING PROGRAM

## 2024-02-07 PROCEDURE — 83735 ASSAY OF MAGNESIUM: CPT | Mod: IPSPLIT | Performed by: STUDENT IN AN ORGANIZED HEALTH CARE EDUCATION/TRAINING PROGRAM

## 2024-02-07 PROCEDURE — 85027 COMPLETE CBC AUTOMATED: CPT | Mod: IPSPLIT | Performed by: STUDENT IN AN ORGANIZED HEALTH CARE EDUCATION/TRAINING PROGRAM

## 2024-02-07 PROCEDURE — 36415 COLL VENOUS BLD VENIPUNCTURE: CPT | Mod: IPSPLIT | Performed by: STUDENT IN AN ORGANIZED HEALTH CARE EDUCATION/TRAINING PROGRAM

## 2024-02-07 PROCEDURE — G0378 HOSPITAL OBSERVATION PER HR: HCPCS

## 2024-02-07 PROCEDURE — 2500000001 HC RX 250 WO HCPCS SELF ADMINISTERED DRUGS (ALT 637 FOR MEDICARE OP): Mod: IPSPLIT | Performed by: STUDENT IN AN ORGANIZED HEALTH CARE EDUCATION/TRAINING PROGRAM

## 2024-02-07 PROCEDURE — C9113 INJ PANTOPRAZOLE SODIUM, VIA: HCPCS | Mod: IPSPLIT | Performed by: STUDENT IN AN ORGANIZED HEALTH CARE EDUCATION/TRAINING PROGRAM

## 2024-02-07 PROCEDURE — 1100000001 HC PRIVATE ROOM DAILY: Mod: IPSPLIT

## 2024-02-07 RX ORDER — ONDANSETRON HYDROCHLORIDE 2 MG/ML
4 INJECTION, SOLUTION INTRAVENOUS EVERY 6 HOURS PRN
Status: DISCONTINUED | OUTPATIENT
Start: 2024-02-07 | End: 2024-02-09 | Stop reason: HOSPADM

## 2024-02-07 RX ADMIN — SODIUM CHLORIDE, POTASSIUM CHLORIDE, SODIUM LACTATE AND CALCIUM CHLORIDE 125 ML/HR: 600; 310; 30; 20 INJECTION, SOLUTION INTRAVENOUS at 04:23

## 2024-02-07 RX ADMIN — PANTOPRAZOLE SODIUM 40 MG: 40 INJECTION, POWDER, FOR SOLUTION INTRAVENOUS at 09:00

## 2024-02-07 RX ADMIN — PANTOPRAZOLE SODIUM 40 MG: 40 INJECTION, POWDER, FOR SOLUTION INTRAVENOUS at 21:27

## 2024-02-07 RX ADMIN — SODIUM CHLORIDE, POTASSIUM CHLORIDE, SODIUM LACTATE AND CALCIUM CHLORIDE 125 ML/HR: 600; 310; 30; 20 INJECTION, SOLUTION INTRAVENOUS at 12:19

## 2024-02-07 RX ADMIN — ONDANSETRON 4 MG: 2 INJECTION INTRAMUSCULAR; INTRAVENOUS at 04:23

## 2024-02-07 RX ADMIN — ONDANSETRON 4 MG: 2 INJECTION INTRAMUSCULAR; INTRAVENOUS at 12:24

## 2024-02-07 RX ADMIN — SODIUM CHLORIDE, POTASSIUM CHLORIDE, SODIUM LACTATE AND CALCIUM CHLORIDE 125 ML/HR: 600; 310; 30; 20 INJECTION, SOLUTION INTRAVENOUS at 21:28

## 2024-02-07 RX ADMIN — PROMETHAZINE HYDROCHLORIDE 12.5 MG: 25 INJECTION INTRAMUSCULAR; INTRAVENOUS at 14:13

## 2024-02-07 RX ADMIN — MELATONIN TAB 3 MG 3 MG: 3 TAB at 21:27

## 2024-02-07 ASSESSMENT — ACTIVITIES OF DAILY LIVING (ADL): LACK_OF_TRANSPORTATION: NO

## 2024-02-07 ASSESSMENT — COGNITIVE AND FUNCTIONAL STATUS - GENERAL
MOBILITY SCORE: 24
MOBILITY SCORE: 24
DAILY ACTIVITIY SCORE: 24
DAILY ACTIVITIY SCORE: 24

## 2024-02-07 ASSESSMENT — PAIN SCALES - GENERAL
PAINLEVEL_OUTOF10: 0 - NO PAIN

## 2024-02-07 NOTE — CARE PLAN
The patient's goals for the shift include      The clinical goals for the shift include tolerate diet this shift    Patient vitals wnl. No complaints of chest pain. Medicated for nausea per MAR. Patient in bed tolerating medications and IV fluids. Lungs clear. Urinating without difficulty. Has had loose stool this shift.

## 2024-02-07 NOTE — ED PROVIDER NOTES
HPI   Chief Complaint   Patient presents with    Fatigue       63-year-old male with history of hyperlipidemia and hypertension comes to the ED complaint of generalized weakness, weight loss, poor intake, malaise/fatigue for the last several months.  Patient reports that he is not sure what is led to the symptoms however they continue to progress and worsen and he try to follow-up with his primary care doctor.  He was advised after seeing them to go to the ED for further evaluation and assessment.  Patient was brought in by family and continued to state he is not sure what brought the symptoms on but he continues to feel very rundown and weak.  Patient states he has not had much of an appetite and is continue to have persistent nausea and reports having only some intermittent liquid stool every other day and having some intermittent abdominal cramping.  Patient in the ED is alert, cooperative, appears withdrawn, uncomfortable, but in no distress.  Patient currently denies headache, blurred vision, neck pain, chest pain, back pain, shortness of breath, fevers, falls, recent travel, sick contacts, dysuria, hematuria, and dizziness.      History provided by:  Patient and medical records   used: No                        No data recorded                   Patient History   Past Medical History:   Diagnosis Date    Hyperlipemia     Hypertension     Other specified health status     No known problems     Past Surgical History:   Procedure Laterality Date    CORONARY ARTERY BYPASS GRAFT      x 4    OTHER SURGICAL HISTORY  09/06/2016    Leg Incision And Drainage     No family history on file.  Social History     Tobacco Use    Smoking status: Former     Types: Cigarettes    Smokeless tobacco: Never   Vaping Use    Vaping Use: Never used   Substance Use Topics    Alcohol use: Not Currently    Drug use: Yes     Types: Marijuana     Comment: weekly       Physical Exam   ED Triage Vitals   Temp Heart Rate  Resp BP   02/06/24 1523 02/06/24 1523 02/06/24 1523 02/06/24 1523   36.4 °C (97.6 °F) 68 18 (!) 110/91      SpO2 Temp Source Heart Rate Source Patient Position   02/06/24 1523 02/06/24 1956 02/06/24 1956 02/06/24 1956   99 % Temporal Monitor Sitting      BP Location FiO2 (%)     02/06/24 1956 --     Left arm        Physical Exam  Vitals and nursing note reviewed.   Constitutional:       General: He is not in acute distress.     Appearance: He is well-developed.   HENT:      Head: Normocephalic and atraumatic.   Eyes:      Conjunctiva/sclera: Conjunctivae normal.   Cardiovascular:      Rate and Rhythm: Normal rate and regular rhythm.      Pulses: Normal pulses.      Heart sounds: Normal heart sounds, S1 normal and S2 normal. No murmur heard.  Pulmonary:      Effort: Pulmonary effort is normal. No respiratory distress.      Breath sounds: Normal breath sounds.   Abdominal:      General: Abdomen is flat.      Palpations: Abdomen is soft.      Tenderness: There is abdominal tenderness in the periumbilical area. There is no right CVA tenderness, left CVA tenderness, guarding or rebound.      Hernia: No hernia is present.       Musculoskeletal:         General: No swelling.      Cervical back: Neck supple.   Skin:     General: Skin is warm and dry.      Capillary Refill: Capillary refill takes less than 2 seconds.      Coloration: Skin is pale.   Neurological:      General: No focal deficit present.      Mental Status: He is alert and oriented to person, place, and time.      GCS: GCS eye subscore is 4. GCS verbal subscore is 5. GCS motor subscore is 6.      Cranial Nerves: Cranial nerves 2-12 are intact.      Sensory: Sensation is intact.      Motor: Motor function is intact.      Comments: Generalized weakness in lower extremities with a 3/5 bilaterally  Per patient persistent for several months   Psychiatric:         Mood and Affect: Mood normal.       CT abdomen pelvis wo IV contrast   Final Result   There is mild  edema surrounding the body and tail of the pancreas.   This may represent pancreatitis.   Signed by Jeronimo Jacobs MD      CT head wo IV contrast   Final Result   No evidence of acute cortical infarct or intracranial hemorrhage.        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        MACRO:   None        Signed by: Bin Vasquez 2/6/2024 5:28 PM   Dictation workstation:   [id number]      XR chest 1 view   Final Result   No acute process.   Signed by Mark Nieves MD        Labs Reviewed   CBC WITH AUTO DIFFERENTIAL - Abnormal       Result Value    WBC 10.5      nRBC 0.0      RBC 4.77      Hemoglobin 16.6      Hematocrit 49.3       (*)     MCH 34.8 (*)     MCHC 33.7      RDW 13.8      Platelets 169      Neutrophils % 67.5      Immature Granulocytes %, Automated 0.2      Lymphocytes % 19.8      Monocytes % 10.1      Eosinophils % 1.7      Basophils % 0.7      Neutrophils Absolute 7.07      Immature Granulocytes Absolute, Automated 0.02      Lymphocytes Absolute 2.08      Monocytes Absolute 1.06 (*)     Eosinophils Absolute 0.18      Basophils Absolute 0.07     COMPREHENSIVE METABOLIC PANEL - Abnormal    Glucose 93      Sodium 130 (*)     Potassium 3.2 (*)     Chloride 83 (*)     Bicarbonate 30      Anion Gap 20      Urea Nitrogen 30 (*)     Creatinine 2.05 (*)     eGFR 36 (*)     Calcium 9.6      Albumin 4.1      Alkaline Phosphatase 142 (*)     Total Protein 8.1      AST 74 (*)     Bilirubin, Total 1.3 (*)     ALT 67 (*)    LIPASE - Abnormal    Lipase 138 (*)     Narrative:     Venipuncture immediately after or during the administration of Metamizole may lead to falsely low results. Testing should be performed immediately prior to Metamizole dosing.   DRUG SCREEN,URINE - Abnormal    Amphetamine Screen, Urine Presumptive Negative      Barbiturate Screen, Urine Presumptive Negative      Benzodiazepines Screen, Urine Presumptive Negative      Cannabinoid Screen, Urine Presumptive Positive (*)     Cocaine  Metabolite Screen, Urine Presumptive Negative      Fentanyl Screen, Urine Presumptive Negative      Opiate Screen, Urine Presumptive Negative      Oxycodone Screen, Urine Presumptive Negative      PCP Screen, Urine Presumptive Negative      Narrative:     Drug screen results are presumptive and should not be used to assess   compliance with prescribed medication. Contact the performing [facility] laboratory   to add-on definitive confirmatory testing if clinically indicated.    Toxicology screening results are reported qualitatively. The concentration must   be greater than or equal to the cutoff to be reported as positive. The concentration   at which the screening test can detect an individual drug or metabolite varies.   The absence of expected drug(s) and/or drug metabolite(s) may indicate non-compliance,   inappropriate timing of specimen collection relative to drug administration, poor drug   absorption, diluted/adulterated urine, or limitations of testing. For medical purposes   only; not valid for forensic use.    Interpretive questions should be directed to the laboratory medical directors.   URINALYSIS WITH REFLEX CULTURE AND MICROSCOPIC - Abnormal    Color, Urine Rocio (*)     Appearance, Urine Hazy (*)     Specific Gravity, Urine 1.018      pH, Urine 5.0      Protein, Urine 100 (2+) (*)     Glucose, Urine NEGATIVE      Blood, Urine SMALL (1+) (*)     Ketones, Urine 5 (TRACE) (*)     Bilirubin, Urine NEGATIVE      Urobilinogen, Urine 2.0 (*)     Nitrite, Urine NEGATIVE      Leukocyte Esterase, Urine NEGATIVE     MAGNESIUM - Abnormal    Magnesium 2.53 (*)    URINALYSIS MICROSCOPIC WITH REFLEX CULTURE - Abnormal    WBC, Urine 1-5      RBC, Urine 6-10 (*)     Squamous Epithelial Cells, Urine 1-9 (SPARSE)      Bacteria, Urine 1+ (*)     Mucus, Urine FEW      Hyaline Casts, Urine OCCASIONAL (*)     RBC Casts, Urine 2+ (*)    INFLUENZA A AND B PCR - Normal    Flu A Result Not Detected      Flu B Result Not Detected       Narrative:     This assay is an in vitro diagnostic multiplex nucleic acid amplification test for the detection and discrimination of Influenza A & B from nasopharyngeal specimens, and has been validated for use at [facility]. Negative results do not preclude Influenza A/B infections, and should not be used as the sole basis for diagnosis, treatment, or other management decisions. If Influenza A/B and RSV PCR results are negative, testing for Parainfluenza virus, Adenovirus and Metapneumovirus is routinely performed for [facility] and intensive care inpatients, and is available on other patients by placing an add-on request.   SARS-COV-2 PCR - Normal    Coronavirus 2019, PCR Not Detected      Narrative:     This assay has received FDA Emergency Use Authorization (EUA) and is only authorized for the duration of time that circumstances exist to justify the authorization of the emergency use of in vitro diagnostic tests for the detection of SARS-CoV-2 virus and/or diagnosis of COVID-19 infection under section 564(b)(1) of the Act, 21 U.S.C. 360bbb-3(b)(1). This assay is an in vitro diagnostic nucleic acid amplification test for the qualitative detection of SARS-CoV-2 from nasopharyngeal specimens and has been validated for use at [facility]. Negative results do not preclude COVID-19 infections and should not be used as the sole basis for diagnosis, treatment, or other management decisions.     SERIAL TROPONIN-INITIAL - Normal    Troponin I, High Sensitivity 18      Narrative:     Less than 99th percentile of normal range cutoff-  Female and children under 18 years old <14 ng/L; Male <21 ng/L: Negative  Repeat testing should be performed if clinically indicated.     Female and children under 18 years old 14-50 ng/L; Male 21-50 ng/L:  Consistent with possible cardiac damage and possible increased clinical   risk. Serial measurements may help to assess extent  of myocardial damage.     >50 ng/L: Consistent with cardiac damage, increased clinical risk and  myocardial infarction. Serial measurements may help assess extent of   myocardial damage.      NOTE: Children less than 1 year old may have higher baseline troponin   levels and results should be interpreted in conjunction with the overall   clinical context.     NOTE: Troponin I testing is performed using a different   testing methodology at [facility] than at other   [facility]. Direct result comparisons should only   be made within the same method.   AMMONIA - Normal    Ammonia 25     LACTATE - Normal    Lactate 1.4      Narrative:     Venipuncture immediately after or during the administration of Metamizole may lead to falsely low results. Testing should be performed immediately  prior to Metamizole dosing.   ALCOHOL - Normal    Alcohol <10     SERIAL TROPONIN, 1 HOUR - Normal    Troponin I, High Sensitivity 19      Narrative:     Less than 99th percentile of normal range cutoff-  Female and children under 18 years old <14 ng/L; Male <21 ng/L: Negative  Repeat testing should be performed if clinically indicated.     Female and children under 18 years old 14-50 ng/L; Male 21-50 ng/L:  Consistent with possible cardiac damage and possible increased clinical   risk. Serial measurements may help to assess extent of myocardial damage.     >50 ng/L: Consistent with cardiac damage, increased clinical risk and  myocardial infarction. Serial measurements may help assess extent of   myocardial damage.      NOTE: Children less than 1 year old may have higher baseline troponin   levels and results should be interpreted in conjunction with the overall   clinical context.     NOTE: Troponin I testing is performed using a different   testing methodology at [facility] than at other   [facility]. Direct result comparisons should only   be made within the same method.   TROPONIN SERIES- (INITIAL,  1 HR)    Narrative:     The following orders were created for panel order Troponin Series, (0, 1 HR).  Procedure                               Abnormality         Status                     ---------                               -----------         ------                     Troponin I, High Sensiti...[[id number]]  Normal              Final result               Troponin, High Sensitivi...[[id number]]  Normal              Final result                 Please view results for these tests on the individual orders.   URINALYSIS WITH REFLEX CULTURE AND MICROSCOPIC    Narrative:     The following orders were created for panel order Urinalysis with Reflex Culture and Microscopic.  Procedure                               Abnormality         Status                     ---------                               -----------         ------                     Urinalysis with Reflex C...[[id number]]  Abnormal            Final result               Extra Urine Gray Tube[[id number]]                            In process                   Please view results for these tests on the individual orders.   EXTRA URINE GRAY TUBE   C-REACTIVE PROTEIN   SEDIMENTATION RATE, AUTOMATED   TSH WITH REFLEX TO FREE T4 IF ABNORMAL   CBC   MAGNESIUM   HEPATIC FUNCTION PANEL   PHOSPHORUS   BASIC METABOLIC PANEL     1520 -- NSR rate 73.  Normal axis.  Normal intervals.   ST-T wave changes noted across V1 through V5.  no findings of STEMI. Unchanged compared to old EKG last performed.    1526 -- right-sided EKG NSR rate 67.  Normal axis.  Normal intervals.  ST-T wave changes noted across V1 through V5. no findings of STEMI. Unchanged compared to old EKG    ED Course & MDM   Diagnoses as of 02/06/24 2004   Recurrent acute pancreatitis   Generalized weakness   Nausea and vomiting, unspecified vomiting type       Medical Decision Making  Patient upon arrival to ED appeared to be withdrawn and uncomfortable but in no distress stable vital signs were discussed  with patient presenting complaints and clinical findings.  Reviewed with patient his epic chart and counseled patient on generalized weakness and appropriate approach to management/treatments.  After assessment and evaluation IV fluids started, labs sent, imaging ordered, EKG reviewed, given IV Zofran, placed on cardiac monitor, and observed.  At this time after review EKG there is concerns of depressed ST and T wave across V1 through V5 and right-sided EKG was performed and reviewed and documented.  At this time findings were consistent with previous EKG showing the abnormality.  Additional labs were ordered and sent and patient was observed.  After period of time patient was reassessed and appeared to be a little more comfortable, continue to be IV hydrated, given further IV nausea medication, and vital signs continue to be appropriate.  Final results were reviewed/discussed with patient at this time discussion was had with patient regarding need for admission and continued care.  Patient was agreeable to plan of care and on-call internal medicine at [facility] was contacted and case was discussed.  After discussion was agreed patient require admission to the floor for continued care and treatment of the findings.  Patient stable and transferred to the floor.    Amount and/or Complexity of Data Reviewed  External Data Reviewed: labs, radiology, ECG and notes.  Labs: ordered. Decision-making details documented in ED Course.  Radiology: ordered. Decision-making details documented in ED Course.  ECG/medicine tests: ordered and independent interpretation performed. Decision-making details documented in ED Course.    Risk  Decision regarding hospitalization.        Procedure  Procedures     Adna Lynch MD  02/06/24 2019

## 2024-02-07 NOTE — PROGRESS NOTES
02/07/24 1103   Discharge Planning   Living Arrangements Alone   Support Systems Family members   Assistance Needed Son/dtr assist with transport for meds and groceries. He uses [facility] under his insruance for transport to/from medical appointments   Type of Residence Private residence   Do you have animals or pets at home? No   Patient expects to be discharged to: home   Financial Resource Strain   How hard is it for you to pay for the very basics like food, housing, medical care, and heating? Not hard   Housing Stability   In the last 12 months, was there a time when you were not able to pay the mortgage or rent on time? N   In the last 12 months, how many places have you lived? 1   In the last 12 months, was there a time when you did not have a steady place to sleep or slept in a shelter (including now)? N   Transportation Needs   In the past 12 months, has lack of transportation kept you from medical appointments or from getting medications? no   In the past 12 months, has lack of transportation kept you from meetings, work, or from getting things needed for daily living? No     Met with pt in room. He clarified that if he calls [facility] at least 24 hrs prior, he can get transport to/from appointments. [facility]. He is expected to be discharged to home within 1-2 days. CHERELLE Henry

## 2024-02-07 NOTE — NURSING NOTE
Patient had a small emesis 30 minutes ago. Patient is now resting in bed, no s/s of distress noted. Call light in reach, nursing student in room.

## 2024-02-07 NOTE — CONSULTS
Nutrition Assessment Note  Nutrition Assessment      Reason for Assessment  Reason for Assessment: Admission nursing screening (poor appetite and unintentional wt loss)    HPI: Pt came to the ED complaining of generalized weakness, weight loss, poor intake, malaise/fatigue for the last several months. Pt admitted for acute pancreatitis, generalized weakness, nausea and vomiting.    PMH: HLD, HTN    History:  Food and Nutrient History  Energy Intake: Poor < 50 %  Food and Nutrient History: Pt visited in room. Pt reports no difficulties chewing or swallowing. States he has had symptoms of nausea, poor appetite, occasional emesis, and taste changes for ~5 weeks (before [place]). States he has also been experiencing loose stools, sometimes 2-4x/day. Pt states due to his poor intake he has been feeling extremely weak. He use to take long walks or stand outside for long periods of time, but now he just sits in the recliner. Pt reports a UBW of 240 lbs. Pt states he has adequate help at home from his son and daughter. Per pt's nurse, pt tried to eat breakfast today, but had emesis. RDN educated and encourage diet of clear liquids. Appropriate clear liquids discussed. Pt has order for Ensure plus high protein TID. Pt amendable to changing supplement to Ensure Clear TID PRN his request and educated to sip liquids.       Food Allergies/Intolerances:  None  Energy intake: Energy Intake: Poor < 50 %  GI Symptoms: Diarrhea, Nausea, and Vomiting     Oral Problems: None      Objective Data:  Nutrition Significant Labs:  02/06/24:   Sodium 130v  Potassium 3.2v  BUN 30^  Creat 2.05^  eGFR 36v  Lipase 138^      Nutrition Specific Mediations:  Zofran  Protonix  Miralax      I/O:     Intake/Output Summary (Last 24 hours) at 2/7/2024 1518  Last data filed at 2/7/2024 0941  Gross per 24 hour   Intake 598.33 ml   Output --   Net 598.33 ml       Dietary Orders (From admission, onward)       Start     Ordered    02/07/24 1247  Oral  "nutritional supplements  Until discontinued        Comments: Ensure clear with trays per pt request only; serve with a cup of ice   Question Answer Comment   Deliver with All meals    Select supplement: Ensure Clear        02/07/24 1248    02/06/24 2031  Adult diet Regular  Diet effective now        Question:  Diet type  Answer:  Regular    02/06/24 2030 02/06/24 2002  May Participate in Room Service  Once        Question:  .  Answer:  Yes    02/06/24 2002                      Anthropometrics:  Height: 177.8 cm (5' 10\")  Weight: 91.6 kg (201 lb 15.1 oz)  BMI (Calculated): 28.98       Wt Readings from Last 10 Encounters:   02/06/24 91.6 kg (201 lb 15.1 oz)   01/17/24 100 kg (221 lb)   01/08/24 101 kg (222 lb 0.1 oz)   12/22/23 110 kg (241 lb 13.5 oz)   12/21/23 113 kg (250 lb)   12/17/23 112 kg (246 lb 14.6 oz)   06/09/23 104 kg (229 lb)   03/20/23 110 kg (243 lb 4 oz)   11/21/22 111 kg (245 lb 1 oz)   10/14/22 107 kg (235 lb)       Weight Change  Weight History / % Weight Change: 2/22/23 - 110 kg; 18.4kg / 16.7% wt loss X 5 weeks  Significant Weight Loss: Yes  Interpretation of Weight Loss: >5% in 1 month         IBW/kg (Dietitian Calculated): 75.5 kg  Percent of IBW: 121 %       Energy Needs:  Calculated Energy Needs Using Equations  Height: 177.8 cm (5' 10\")  Weight Used for Equation Calculations: 91.6 kg (201 lb 15.1 oz)  Cheryle Allison Equation (Overweight or Obese Patients): 1722  Equation Chosen to Use by RD: Zofia Danielle  Activity Factor: 1.2  Stress Factor: 1.2  Total Energy Needs: 2475  Temp: 36.8 °C (98.3 °F)    Estimated Energy Needs  Total Energy Estimated Needs (kCal): 2475 kCal  Method for Estimating Needs: MSJ x AF 1.2 X IF 1.2    Estimated Protein Needs  Total Protein Estimated Needs (g): 95 g (90-98 gm/day)  Method for Estimating Needs: 1.2-1.3 gm/kg IBW    Estimated Fluid Needs  Total Fluid Estimated Needs (mL): 2475 mL  Method for Estimating Needs: 1 mL/Kcal or per team         Nutrition " Focused Physical Findings:  Subcutaneous Fat Loss  Orbital Fat Pads: Mild-Moderate (slight dark circles and slight hollowing)  Buccal Fat Pads: Well nourished (full, rounded cheeks)  Triceps: Well nourished (ample fat tissue)    Muscle Wasting  Temporalis: Mild-Moderate (slight depression)  Pectoralis (Clavicular Region): Well nourished (clavicle not visible)  Deltoid/Trapezius: Well nourished (rounded appearance at arm, shoulder, neck)  Interosseous: Mild-Moderate (slightly depressed area between thumb and forefinger)  Trapezius/Infraspinatus/Supraspinatus (Scapular Region): Defer  Quadriceps: Mild-moderate (mild depression on inner and outer thigh)  Gastrocnemius: Mild-Moderate (not well developed muscle)    Edema  Edema: none       Physical Findings (Nutrition Deficiency/Toxicity)  Hair: Negative  Eyes: Negative  Mouth: Negative  Nails: Negative  Skin: Positive (dry)       Nutrition Diagnosis   Malnutrition Diagnosis  Patient has Malnutrition Diagnosis: Yes  Diagnosis Status: New  Malnutrition Diagnosis: Mild malnutrition related to acute disease or injury  As Evidenced by: Estimated intake <50% of estimated energy needs for >1 month; Significant unintentional wt loss of 18.4kg (16.7%) in the past 5 weeks; mild-moderate muscle wasting (temporal, quadricep, and gastrocnemius); mild-moderate subcutaneous fat wasting (orbital); complaints of profound weakness; acute pancreatitis      Nutrition Interventions/Recommendations   Nutrition Prescription  Individualized Nutrition Prescription Provided for : diet, fluids, ONS    Food and/or Nutrient Delivery Interventions  Meals and Snacks: Other (Comment)  Goal: Clear liquid diet, advance as tolerated       Medical Food Supplement: Commercial beverage  Goal: D/C Ensure plus high protein and send Ensure Clear TID (350 Kcal and 20 gm protein per 237 mL serving)       Coordination of Nutrition Care by a Nutrition Professional  Collaboration and Referral of Nutrition Care:  Collaboration by nutrition professional with other providers  Goal: Pt's care discussed at IDT and with pt's nurse Nelida VORA      Nutrition Monitoring and Evaluation   Food and Nutrient Related History  Energy Intake: Estimated energy intake  Criteria: Pt will consume and tolerate >75% of Clear liquid diet      Anthropometrics: Body Composition/Growth/Weight History  Weight: Measured weight  Criteria: Maintain weight     Biochemical Data, Medical Tests and Procedures  Electrolyte and Renal Panel: Sodium, Potassium, Creatinine, BUN  Criteria: WNL       Evaluate nutrition intervention as compared to nutrition goal(s) and estimated nutrient need criteria.       Follow Up  Time Spent (min): 60 minutes  Last Date of Nutrition Visit: 02/07/24  Nutrition Follow-Up Needed?: Dietitian to reassess per policy  Follow up Comment: Pt with mild malnutrition; high risk

## 2024-02-07 NOTE — CARE PLAN
The patient's goals for the shift include      The clinical goals for the shift include Patient will not have any vomiting through 1900    Over the shift, the patient did not make progress, patient had 2 episodes of small emesis this shift. A new order of Phenergan infused with good results. Call light in reach.

## 2024-02-08 LAB
ALBUMIN SERPL BCP-MCNC: 3.3 G/DL (ref 3.4–5)
ALP SERPL-CCNC: 111 U/L (ref 33–136)
ALT SERPL W P-5'-P-CCNC: 44 U/L (ref 10–52)
AST SERPL W P-5'-P-CCNC: 53 U/L (ref 9–39)
BILIRUB DIRECT SERPL-MCNC: 0.4 MG/DL (ref 0–0.3)
BILIRUB SERPL-MCNC: 1.1 MG/DL (ref 0–1.2)
ERYTHROCYTE [DISTWIDTH] IN BLOOD BY AUTOMATED COUNT: 13.9 % (ref 11.5–14.5)
HCT VFR BLD AUTO: 39.1 % (ref 41–52)
HGB BLD-MCNC: 13.4 G/DL (ref 13.5–17.5)
MAGNESIUM SERPL-MCNC: 2.12 MG/DL (ref 1.6–2.4)
MCH RBC QN AUTO: 34.8 PG (ref 26–34)
MCHC RBC AUTO-ENTMCNC: 34.3 G/DL (ref 32–36)
MCV RBC AUTO: 102 FL (ref 80–100)
NRBC BLD-RTO: 0 /100 WBCS (ref 0–0)
PLATELET # BLD AUTO: 156 X10*3/UL (ref 150–450)
PROT SERPL-MCNC: 6.6 G/DL (ref 6.4–8.2)
RBC # BLD AUTO: 3.85 X10*6/UL (ref 4.5–5.9)
WBC # BLD AUTO: 9.4 X10*3/UL (ref 4.4–11.3)

## 2024-02-08 PROCEDURE — 83735 ASSAY OF MAGNESIUM: CPT | Mod: IPSPLIT | Performed by: STUDENT IN AN ORGANIZED HEALTH CARE EDUCATION/TRAINING PROGRAM

## 2024-02-08 PROCEDURE — 36415 COLL VENOUS BLD VENIPUNCTURE: CPT | Mod: IPSPLIT | Performed by: STUDENT IN AN ORGANIZED HEALTH CARE EDUCATION/TRAINING PROGRAM

## 2024-02-08 PROCEDURE — 1100000001 HC PRIVATE ROOM DAILY: Mod: IPSPLIT

## 2024-02-08 PROCEDURE — 2500000001 HC RX 250 WO HCPCS SELF ADMINISTERED DRUGS (ALT 637 FOR MEDICARE OP): Mod: IPSPLIT | Performed by: STUDENT IN AN ORGANIZED HEALTH CARE EDUCATION/TRAINING PROGRAM

## 2024-02-08 PROCEDURE — 2500000004 HC RX 250 GENERAL PHARMACY W/ HCPCS (ALT 636 FOR OP/ED): Mod: IPSPLIT | Performed by: STUDENT IN AN ORGANIZED HEALTH CARE EDUCATION/TRAINING PROGRAM

## 2024-02-08 PROCEDURE — G0378 HOSPITAL OBSERVATION PER HR: HCPCS

## 2024-02-08 PROCEDURE — 85027 COMPLETE CBC AUTOMATED: CPT | Mod: IPSPLIT | Performed by: STUDENT IN AN ORGANIZED HEALTH CARE EDUCATION/TRAINING PROGRAM

## 2024-02-08 PROCEDURE — 80076 HEPATIC FUNCTION PANEL: CPT | Mod: IPSPLIT | Performed by: STUDENT IN AN ORGANIZED HEALTH CARE EDUCATION/TRAINING PROGRAM

## 2024-02-08 PROCEDURE — C9113 INJ PANTOPRAZOLE SODIUM, VIA: HCPCS | Mod: IPSPLIT | Performed by: STUDENT IN AN ORGANIZED HEALTH CARE EDUCATION/TRAINING PROGRAM

## 2024-02-08 RX ORDER — ONDANSETRON 4 MG/1
4 TABLET, FILM COATED ORAL EVERY 6 HOURS PRN
Qty: 60 TABLET | Refills: 2 | Status: SHIPPED | OUTPATIENT
Start: 2024-02-08

## 2024-02-08 RX ORDER — METOPROLOL SUCCINATE 25 MG/1
25 TABLET, EXTENDED RELEASE ORAL DAILY
Qty: 30 TABLET | Refills: 2 | Status: SHIPPED | OUTPATIENT
Start: 2024-02-08 | End: 2024-04-27

## 2024-02-08 RX ORDER — ASPIRIN 81 MG/1
81 TABLET ORAL DAILY
Qty: 30 TABLET | Refills: 2 | Status: SHIPPED | OUTPATIENT
Start: 2024-02-08 | End: 2024-04-27

## 2024-02-08 RX ORDER — NITROGLYCERIN 0.4 MG/1
0.4 TABLET SUBLINGUAL EVERY 5 MIN PRN
Qty: 90 TABLET | Refills: 3 | Status: SHIPPED | OUTPATIENT
Start: 2024-02-08

## 2024-02-08 RX ORDER — PANTOPRAZOLE SODIUM 40 MG/1
40 TABLET, DELAYED RELEASE ORAL DAILY
Qty: 30 TABLET | Refills: 2 | Status: SHIPPED | OUTPATIENT
Start: 2024-02-08 | End: 2024-04-27

## 2024-02-08 RX ORDER — PANCRELIPASE 24000; 76000; 120000 [USP'U]/1; [USP'U]/1; [USP'U]/1
CAPSULE, DELAYED RELEASE PELLETS ORAL
Qty: 240 CAPSULE | Refills: 3 | Status: SHIPPED | OUTPATIENT
Start: 2024-02-08 | End: 2024-05-20

## 2024-02-08 RX ORDER — ATORVASTATIN CALCIUM 40 MG/1
40 TABLET, FILM COATED ORAL DAILY
Qty: 30 TABLET | Refills: 2 | Status: SHIPPED | OUTPATIENT
Start: 2024-02-08 | End: 2024-04-27

## 2024-02-08 RX ADMIN — ONDANSETRON 4 MG: 2 INJECTION INTRAMUSCULAR; INTRAVENOUS at 02:12

## 2024-02-08 RX ADMIN — PANTOPRAZOLE SODIUM 40 MG: 40 INJECTION, POWDER, FOR SOLUTION INTRAVENOUS at 08:31

## 2024-02-08 RX ADMIN — ONDANSETRON 4 MG: 2 INJECTION INTRAMUSCULAR; INTRAVENOUS at 17:19

## 2024-02-08 RX ADMIN — SODIUM CHLORIDE, POTASSIUM CHLORIDE, SODIUM LACTATE AND CALCIUM CHLORIDE 125 ML/HR: 600; 310; 30; 20 INJECTION, SOLUTION INTRAVENOUS at 14:57

## 2024-02-08 RX ADMIN — SODIUM CHLORIDE, POTASSIUM CHLORIDE, SODIUM LACTATE AND CALCIUM CHLORIDE 125 ML/HR: 600; 310; 30; 20 INJECTION, SOLUTION INTRAVENOUS at 23:00

## 2024-02-08 RX ADMIN — PANTOPRAZOLE SODIUM 40 MG: 40 INJECTION, POWDER, FOR SOLUTION INTRAVENOUS at 23:00

## 2024-02-08 RX ADMIN — MELATONIN TAB 3 MG 3 MG: 3 TAB at 23:00

## 2024-02-08 RX ADMIN — SODIUM CHLORIDE, POTASSIUM CHLORIDE, SODIUM LACTATE AND CALCIUM CHLORIDE 125 ML/HR: 600; 310; 30; 20 INJECTION, SOLUTION INTRAVENOUS at 05:44

## 2024-02-08 ASSESSMENT — PAIN SCALES - GENERAL
PAINLEVEL_OUTOF10: 0 - NO PAIN

## 2024-02-08 ASSESSMENT — PAIN - FUNCTIONAL ASSESSMENT: PAIN_FUNCTIONAL_ASSESSMENT: 0-10

## 2024-02-08 NOTE — CARE PLAN
The patient's goals for the shift include      The clinical goals for the shift include Patient will tolerate eating and drinking through 1900    Over the shift, the patient did eat some jello and drank some water. Patient had some gagging at dinner time, zofran given per order. Patient denies any discomfort at this time. Call light in reach.     Medical Necessity Clause: This procedure was medically necessary because the lesions that were treated were: Include Z78.9 (Other Specified Conditions Influencing Health Status) As An Associated Diagnosis?: Yes Consent: The patient's consent was obtained including but not limited to risks of crusting, scabbing, blistering, scarring, darker or lighter pigmentary change, recurrence, incomplete removal and infection. Medical Necessity Information: It is in your best interest to select a reason for this procedure from the list below. All of these items fulfill various CMS LCD requirements except the new and changing color options. Add 52 Modifier (Optional): no Post-Care Instructions: I reviewed with the patient in detail post-care instructions. Patient is to wear sunprotection, and avoid picking at any of the treated lesions. Pt may apply Vaseline to crusted or scabbing areas. Detail Level: Detailed

## 2024-02-08 NOTE — NURSING NOTE
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 Patient has steady gait and denies discomfort at this time.

## 2024-02-08 NOTE — NURSING NOTE
2030- Patient in bed. No complaints of nausea or vomitting, chest pain, shortness of breath. Vitals wnl. No complaints of pain just mild abdominal discomfort. Call bell in reach.

## 2024-02-08 NOTE — CARE PLAN
The patient's goals for the shift include      The clinical goals for the shift include patient will tolerate diet this shift    Patient in bed. No complaints of shortness of breath and chest pain. Some abdominal discomfort noted that patient states is similar to gas pain. Patient medicated for nausea. Vomitted x1. Urinating and had bowel movement this shift. Tolerating IV fluid and medications. Patient still having trouble with taking medication without getting nauseated. Vitals wnl. Call bell in reach. Patient concerned about declining health.

## 2024-02-08 NOTE — H&P
"HPI    Luis Bucio \"Hernán\" is a 63 y.o. male who presented to [facility] ED due to nausea, vomiting, and abdominal pain. Patient states that he has been dealing with nausea and vomiting on and off for months now. His abdomen is usually bloated and feels full, leading to mild to moderate pain/discomfort throughout. Eating makes him feel worse at times and has noticed a decrease in appetite. He states he can't taste food as well but isn't sure why. Patient admits to a near 20 lb weight loss in the past 6-8 months but denies night sweats, fevers, and chills. He had a recent hospitalization for treatment of pancreatitis in December. Patient also admits to running out of all of his medications and has gone without them since being discharged from [facility] in December. Currently feels very nauseous and has thrown up 2-3 times overnight into the morning while here. He states to have attempted to eat his breakfast and drink Ensure but it made him feel worse. Denies headaches, vision changes, dizziness, palpitations, chest pain, but does admit to feeling short of breath and fatigued. No recent sick contacts.     12 Point ROS negative unless noted in above \[facility]\""    ED Course   Vitals - BP 99/73 (BP Location: Left arm, Patient Position: Lying)   Pulse 82   Temp 35.7 °C (96.3 °F) (Temporal)   Resp 16   Wt 91.6 kg (201 lb 15.1 oz)   SpO2 95%   Labs -   Results for orders placed or performed during the hospital encounter of 02/06/24 (from the past 24 hour(s))   CBC   Result Value Ref Range    WBC 9.9 4.4 - 11.3 x10*3/uL    nRBC 0.0 0.0 - 0.0 /100 WBCs    RBC 3.98 (L) 4.50 - 5.90 x10*6/uL    Hemoglobin 13.8 13.5 - 17.5 g/dL    Hematocrit 39.9 (L) 41.0 - 52.0 %     80 - 100 fL    MCH 34.7 (H) 26.0 - 34.0 pg    MCHC 34.6 32.0 - 36.0 g/dL    RDW 13.9 11.5 - 14.5 %    Platelets 166 150 - 450 x10*3/uL   Magnesium   Result Value Ref Range    Magnesium 2.40 1.60 - 2.40 mg/dL   Hepatic Function Panel   Result Value Ref Range "    Albumin 3.5 3.4 - 5.0 g/dL    Bilirubin, Total 1.1 0.0 - 1.2 mg/dL    Bilirubin, Direct 0.3 0.0 - 0.3 mg/dL    Alkaline Phosphatase 119 33 - 136 U/L    ALT 53 (H) 10 - 52 U/L    AST 62 (H) 9 - 39 U/L    Total Protein 7.4 6.4 - 8.2 g/dL     Interventions -   Medications   pantoprazole (ProtoNix) injection 40 mg (40 mg intravenous Given 2/7/24 2127)   lactated Ringer's infusion (125 mL/hr intravenous Rate/Dose Verify 2/7/24 2213)   melatonin tablet 3 mg (3 mg oral Given 2/7/24 2127)   polyethylene glycol (Glycolax, Miralax) packet 17 g (17 g oral Not Given 2/7/24 0900)   morphine injection 2 mg (0 mg intravenous Return to Cabinet 2/7/24 0415)   ondansetron (Zofran) injection 4 mg (has no administration in time range)   promethazine (Phenergan) 12.5 mg in sodium chloride 0.9% 50 mL IV (0 mg intravenous Stopped 2/7/24 1428)   sodium chloride 0.9 % bolus 500 mL (0 mL intravenous Stopped 2/6/24 1827)   ondansetron (Zofran) injection 4 mg (4 mg intravenous Given 2/6/24 1935)       Past Medical History     Past Medical History:   Diagnosis Date    Hyperlipemia     Hypertension     Other specified health status     No known problems        Surgical History     Past Surgical History:   Procedure Laterality Date    CORONARY ARTERY BYPASS GRAFT      x 4    OTHER SURGICAL HISTORY  09/06/2016    Leg Incision And Drainage       Family History   No family history on file.    Social History   He reports that he has quit smoking. His smoking use included cigarettes. He has never used smokeless tobacco. He reports that he does not currently use alcohol. He reports current drug use. Drug: Marijuana.    Allergies   Patient has no known allergies.    Medications     Current Facility-Administered Medications:     lactated Ringer's infusion, 125 mL/hr, intravenous, Continuous, Abundio Garrett DO, Last Rate: 125 mL/hr at 02/07/24 2213, 125 mL/hr at 02/07/24 2213    melatonin tablet 3 mg, 3 mg, oral, Daily, Abundio Garrett DO, 3 mg at  "02/07/24 2127    morphine injection 2 mg, 2 mg, intravenous, q3h PRN, Abundio Garrett DO    ondansetron (Zofran) injection 4 mg, 4 mg, intravenous, q6h PRN, Abundio Garrett DO    pantoprazole (ProtoNix) injection 40 mg, 40 mg, intravenous, BID, Abundio Garrett DO, 40 mg at 02/07/24 2127    polyethylene glycol (Glycolax, Miralax) packet 17 g, 17 g, oral, Daily, Abundio Garrett DO    promethazine (Phenergan) 12.5 mg in sodium chloride 0.9% 50 mL IV, 12.5 mg, intravenous, q6h PRN, Abundio Garrett DO, Stopped at 02/07/24 1428    Objective   Vital Signs:  Blood pressure 99/73, pulse 82, temperature 35.7 °C (96.3 °F), temperature source Temporal, resp. rate 16, height 1.778 m (5' 10\"), weight 91.6 kg (201 lb 15.1 oz), SpO2 95 %.    Physical Exam  Constitutional:       Appearance: He is ill-appearing.   HENT:      Head: Normocephalic and atraumatic.      Mouth/Throat:      Mouth: Mucous membranes are moist.   Eyes:      Extraocular Movements: Extraocular movements intact.      Conjunctiva/sclera: Conjunctivae normal.   Cardiovascular:      Rate and Rhythm: Normal rate and regular rhythm.      Pulses: Normal pulses.      Heart sounds: Normal heart sounds.   Pulmonary:      Effort: Pulmonary effort is normal. No respiratory distress.      Breath sounds: Decreased breath sounds present. No wheezing.      Comments: On 2L o2  Abdominal:      General: Bowel sounds are normal. There is distension.      Palpations: Abdomen is soft.   Musculoskeletal:         General: Normal range of motion.   Skin:     General: Skin is warm.   Neurological:      General: No focal deficit present.      Mental Status: He is alert and oriented to person, place, and time. Mental status is at baseline.   Psychiatric:         Mood and Affect: Mood normal.         Behavior: Behavior normal.         Thought Content: Thought content normal.         Judgment: Judgment normal.         Wt Readings from Last 6 Encounters:   02/06/24 91.6 kg (201 lb 15.1 oz) "   01/17/24 100 kg (221 lb)   01/08/24 101 kg (222 lb 0.1 oz)   12/22/23 110 kg (241 lb 13.5 oz)   12/21/23 113 kg (250 lb)   12/17/23 112 kg (246 lb 14.6 oz)       I/Os:    Intake/Output Summary (Last 24 hours) at 2/8/2024 0140  Last data filed at 2/7/2024 2213  Gross per 24 hour   Intake 2752.5 ml   Output --   Net 2752.5 ml       Labs:   Results for orders placed or performed during the hospital encounter of 02/06/24 (from the past 24 hour(s))   CBC   Result Value Ref Range    WBC 9.9 4.4 - 11.3 x10*3/uL    nRBC 0.0 0.0 - 0.0 /100 WBCs    RBC 3.98 (L) 4.50 - 5.90 x10*6/uL    Hemoglobin 13.8 13.5 - 17.5 g/dL    Hematocrit 39.9 (L) 41.0 - 52.0 %     80 - 100 fL    MCH 34.7 (H) 26.0 - 34.0 pg    MCHC 34.6 32.0 - 36.0 g/dL    RDW 13.9 11.5 - 14.5 %    Platelets 166 150 - 450 x10*3/uL   Magnesium   Result Value Ref Range    Magnesium 2.40 1.60 - 2.40 mg/dL   Hepatic Function Panel   Result Value Ref Range    Albumin 3.5 3.4 - 5.0 g/dL    Bilirubin, Total 1.1 0.0 - 1.2 mg/dL    Bilirubin, Direct 0.3 0.0 - 0.3 mg/dL    Alkaline Phosphatase 119 33 - 136 U/L    ALT 53 (H) 10 - 52 U/L    AST 62 (H) 9 - 39 U/L    Total Protein 7.4 6.4 - 8.2 g/dL       Imaging:  ECG 12 lead    Result Date: 2/7/2024  Normal sinus rhythm ST & T wave abnormality, consider inferior ischemia ST & T wave abnormality, consider anterolateral ischemia Prolonged QT Abnormal ECG When compared with ECG of 17-JAN-2024 11:57, T wave inversion now evident in Inferior leads T wave inversion more evident in Lateral leads    ECG 12 lead    Result Date: 2/7/2024  Normal sinus rhythm Possible Anterolateral infarct , age undetermined ST & T wave abnormality, consider inferior ischemia Abnormal ECG When compared with ECG of 06-FEB-2024 15:20, (unconfirmed) Borderline criteria for Anterolateral infarct are now Present ST no longer depressed in Anterolateral leads T wave inversion less evident in Inferior leads T wave inversion less evident in Lateral  leads    ECG 12 lead    Result Date: 2/7/2024  Normal sinus rhythm Left axis deviation ST & T wave abnormality, consider inferior ischemia ST & T wave abnormality, consider anterolateral ischemia Prolonged QT Abnormal ECG When compared with ECG of 06-FEB-2024 15:26, (unconfirmed) Borderline criteria for Anterolateral infarct are no longer Present ST now depressed in Anterolateral leads T wave inversion more evident in Lateral leads    CT abdomen pelvis wo IV contrast    Result Date: 2/6/2024  STUDY: CT Abdomen and Pelvis without IV Contrast; 2/6/2024 5:24 PM INDICATION: Pain, unspecified. COMPARISON: US right upper quadrant 12/22/2023.  CT AP 12/21/2023. ACCESSION NUMBER(S): [id number] ORDERING CLINICIAN: SHO LONGORIA TECHNIQUE: CT of the abdomen and pelvis was performed.  Contiguous axial images were obtained at 3 mm slice thickness through the abdomen and pelvis. Coronal and sagittal reconstructions at 3 mm slice thickness were performed. No intravenous contrast was administered.  Automated mA/kV exposure control was utilized and patient examination was performed in strict accordance with principles of ALARA. FINDINGS: Please note that the evaluation of vessels, lymph nodes and organs is limited without intravenous contrast.  LOWER CHEST: No cardiomegaly.  No pericardial effusion.  Lung bases are clear.  ABDOMEN:  LIVER: No hepatomegaly.  Smooth surface contour.  Normal attenuation.  BILE DUCTS: No intrahepatic or extrahepatic biliary ductal dilatation.  GALLBLADDER: The gallbladder is unremarkable. STOMACH: No abnormalities identified.  PANCREAS: No masses or ductal dilatation. There is mild edema surrounding the body and tail of the pancreas.  SPLEEN: No splenomegaly or focal splenic lesion.  ADRENAL GLANDS: No thickening or nodules.  KIDNEYS AND URETERS: Kidneys are normal in size and location.  No renal or ureteral calculi.  PELVIS:  BLADDER: No abnormalities identified.  REPRODUCTIVE ORGANS: No  abnormalities identified.  BOWEL: No abnormalities identified. The appendix is identified and is unremarkable.  VESSELS: No abnormalities identified.  Abdominal aorta is normal in caliber.  PERITONEUM/RETROPERITONEUM/LYMPH NODES: No free fluid.  No pneumoperitoneum. No lymphadenopathy.  ABDOMINAL WALL: No abnormalities identified. SOFT TISSUES: No abnormalities identified.  BONES: No acute fracture or aggressive osseous lesion.    There is mild edema surrounding the body and tail of the pancreas. This may represent pancreatitis. Signed by Jeronimo Jacobs MD    CT head wo IV contrast    Result Date: 2/6/2024  Interpreted By:  Bin Vasquez, STUDY: CT HEAD WO IV CONTRAST;  2/6/2024 5:19 pm   INDICATION: Signs/Symptoms:weakness.   COMPARISON: None.   ACCESSION NUMBER(S): [id number]   ORDERING CLINICIAN: SHO LONGORIA   TECHNIQUE: Noncontrast axial CT scan of head was performed. Angled reformats in brain and bone windows and coronal and sagittal reconstructions were generated. The images were reviewed in bone, brain, blood and soft tissue windows.   FINDINGS: CSF Spaces: The ventricles, sulci and basal cisterns are within normal limits. There is no extraaxial fluid collection.   Parenchyma: A roughly 1.5 cm in AP dimension and height and 0.5 cm wide low-attenuation infarct in the left corona radiata extends into the upper posterior external capsule adjacent to the superolateral posterior putamen. Mild-to-moderate bilateral carotid siphon calcification is noted. There is no midline shift, other mass effect or intracranial hemorrhage.   Calvarium: The calvarium is unremarkable.   Paranasal sinuses and mastoids: The well developed mastoid air cells, tympanic cavities and visualized portions of the sinuses are clear.       No evidence of acute cortical infarct or intracranial hemorrhage.   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   MACRO: None   Signed by: Bin Vasquez 2/6/2024 5:28 PM Dictation workstation:    [id number]    XR chest 1 view    Result Date: 2/6/2024  STUDY: Chest Radiograph;  2/6/2024 at 3:42 PM. INDICATION: Chest pain. COMPARISON: XR chest 1/2/2024, 12/25/2023. ACCESSION NUMBER(S): [id number] ORDERING CLINICIAN: SHO LONGORIA TECHNIQUE:  Frontal chest was obtained at 15:42 hours. FINDINGS: CARDIOMEDIASTINAL SILHOUETTE: Cardiomediastinal silhouette is normal in size and configuration. Evidence of prior median sternotomy.  LUNGS: Lungs are clear.  ABDOMEN: No remarkable upper abdominal findings.  BONES: No acute osseous changes.    No acute process. Signed by Mark Nieves MD      Assessment & Plan    Luis Bucio is a 63 year old male admitted for management of intractable N/V and abdominal pain due to acute pancreatitis.    Acute pancreatitis c/b intractable nausea  Unintentional weight loss  Decreased appetite  Acute hypoxic respiratory failure    Plan:   - Admits to 20 lb weight loss and constant abdominal bloating  - Hospitalized at [facility] on 12/25/23 for pancreatitis  - CT A/P revealed mild edema surrounding pancreas suggestive of pancreatitis  - Would benefit from Creon script at discharge. Lower atorvastatin dose to 40 mg.  - Requiring 2L o2 -> wean as tolerated. CXR unremarkable  - Continue aggressive IVF and IV pain + nausea control  - Needs to establish care with GI and may need heme/onc evaluation?  - Unable to afford medications, poor outpatient follow-up. Will enroll into [facility] for assistance    Chronic Medical Conditions: Continue home meds  Ischemic cardiomyopathy, CAD s/p CABG 2022  CHF  Hematemesis  JENNIFER (noncompliant with CPAP)  HTN  HLD    Disposition: Anticipate discharge home tomorrow if N/V resolves and weaned from supplemental o2, otherwise expected LOS > 2 midnights.    I spent a total of 60 minutes on the date of the service which included preparing to see the patient, face-to-face patient care, completing clinical documentation, obtaining and/or reviewing separately obtained  history, performing a medically appropriate examination, counseling and educating the patient/family/caregiver, ordering medications, tests, or procedures, independently interpreting results (not separately reported), and communicating results to the patient/family/caregiver.     Abundio Garrett, DO  Internal Medicine

## 2024-02-09 ENCOUNTER — APPOINTMENT (OUTPATIENT)
Dept: RADIOLOGY | Facility: HOSPITAL | Age: 64
End: 2024-02-09
Payer: MEDICAID

## 2024-02-09 VITALS
HEART RATE: 75 BPM | WEIGHT: 201.28 LBS | RESPIRATION RATE: 18 BRPM | TEMPERATURE: 98.6 F | OXYGEN SATURATION: 95 % | DIASTOLIC BLOOD PRESSURE: 67 MMHG | BODY MASS INDEX: 28.82 KG/M2 | HEIGHT: 70 IN | SYSTOLIC BLOOD PRESSURE: 104 MMHG

## 2024-02-09 LAB
ALBUMIN SERPL BCP-MCNC: 2.9 G/DL (ref 3.4–5)
ALP SERPL-CCNC: 88 U/L (ref 33–136)
ALT SERPL W P-5'-P-CCNC: 34 U/L (ref 10–52)
ANION GAP SERPL CALC-SCNC: 10 MMOL/L (ref 10–20)
AST SERPL W P-5'-P-CCNC: 43 U/L (ref 9–39)
BILIRUB DIRECT SERPL-MCNC: 0.4 MG/DL (ref 0–0.3)
BILIRUB SERPL-MCNC: 0.9 MG/DL (ref 0–1.2)
BUN SERPL-MCNC: 11 MG/DL (ref 6–23)
CALCIUM SERPL-MCNC: 8.3 MG/DL (ref 8.6–10.3)
CHLORIDE SERPL-SCNC: 96 MMOL/L (ref 98–107)
CO2 SERPL-SCNC: 26 MMOL/L (ref 21–32)
CREAT SERPL-MCNC: 1.15 MG/DL (ref 0.5–1.3)
EGFRCR SERPLBLD CKD-EPI 2021: 72 ML/MIN/1.73M*2
ERYTHROCYTE [DISTWIDTH] IN BLOOD BY AUTOMATED COUNT: 14 % (ref 11.5–14.5)
GLUCOSE SERPL-MCNC: 83 MG/DL (ref 74–99)
HCT VFR BLD AUTO: 37.2 % (ref 41–52)
HGB BLD-MCNC: 12.8 G/DL (ref 13.5–17.5)
LIPASE SERPL-CCNC: 77 U/L (ref 9–82)
MAGNESIUM SERPL-MCNC: 1.91 MG/DL (ref 1.6–2.4)
MCH RBC QN AUTO: 35 PG (ref 26–34)
MCHC RBC AUTO-ENTMCNC: 34.4 G/DL (ref 32–36)
MCV RBC AUTO: 102 FL (ref 80–100)
NRBC BLD-RTO: 0 /100 WBCS (ref 0–0)
PHOSPHATE SERPL-MCNC: 1.9 MG/DL (ref 2.5–4.9)
PLATELET # BLD AUTO: 130 X10*3/UL (ref 150–450)
POTASSIUM SERPL-SCNC: 3.3 MMOL/L (ref 3.5–5.3)
PROT SERPL-MCNC: 6.3 G/DL (ref 6.4–8.2)
RBC # BLD AUTO: 3.66 X10*6/UL (ref 4.5–5.9)
SODIUM SERPL-SCNC: 129 MMOL/L (ref 136–145)
WBC # BLD AUTO: 8.5 X10*3/UL (ref 4.4–11.3)

## 2024-02-09 PROCEDURE — 99238 HOSP IP/OBS DSCHRG MGMT 30/<: CPT | Performed by: INTERNAL MEDICINE

## 2024-02-09 PROCEDURE — 99232 SBSQ HOSP IP/OBS MODERATE 35: CPT | Performed by: NURSE PRACTITIONER

## 2024-02-09 PROCEDURE — 2500000001 HC RX 250 WO HCPCS SELF ADMINISTERED DRUGS (ALT 637 FOR MEDICARE OP): Mod: IPSPLIT | Performed by: NURSE PRACTITIONER

## 2024-02-09 PROCEDURE — 85027 COMPLETE CBC AUTOMATED: CPT | Mod: IPSPLIT | Performed by: STUDENT IN AN ORGANIZED HEALTH CARE EDUCATION/TRAINING PROGRAM

## 2024-02-09 PROCEDURE — 2550000001 HC RX 255 CONTRASTS: Mod: IPSPLIT | Performed by: STUDENT IN AN ORGANIZED HEALTH CARE EDUCATION/TRAINING PROGRAM

## 2024-02-09 PROCEDURE — 83690 ASSAY OF LIPASE: CPT | Mod: IPSPLIT | Performed by: NURSE PRACTITIONER

## 2024-02-09 PROCEDURE — 82248 BILIRUBIN DIRECT: CPT | Mod: IPSPLIT | Performed by: STUDENT IN AN ORGANIZED HEALTH CARE EDUCATION/TRAINING PROGRAM

## 2024-02-09 PROCEDURE — 2500000004 HC RX 250 GENERAL PHARMACY W/ HCPCS (ALT 636 FOR OP/ED): Mod: IPSPLIT | Performed by: STUDENT IN AN ORGANIZED HEALTH CARE EDUCATION/TRAINING PROGRAM

## 2024-02-09 PROCEDURE — 74177 CT ABD & PELVIS W/CONTRAST: CPT | Mod: IPSPLIT

## 2024-02-09 PROCEDURE — C9113 INJ PANTOPRAZOLE SODIUM, VIA: HCPCS | Mod: IPSPLIT | Performed by: STUDENT IN AN ORGANIZED HEALTH CARE EDUCATION/TRAINING PROGRAM

## 2024-02-09 PROCEDURE — 36415 COLL VENOUS BLD VENIPUNCTURE: CPT | Mod: IPSPLIT | Performed by: STUDENT IN AN ORGANIZED HEALTH CARE EDUCATION/TRAINING PROGRAM

## 2024-02-09 PROCEDURE — 84100 ASSAY OF PHOSPHORUS: CPT | Mod: IPSPLIT | Performed by: STUDENT IN AN ORGANIZED HEALTH CARE EDUCATION/TRAINING PROGRAM

## 2024-02-09 PROCEDURE — 83735 ASSAY OF MAGNESIUM: CPT | Mod: IPSPLIT | Performed by: STUDENT IN AN ORGANIZED HEALTH CARE EDUCATION/TRAINING PROGRAM

## 2024-02-09 PROCEDURE — G0378 HOSPITAL OBSERVATION PER HR: HCPCS

## 2024-02-09 PROCEDURE — 74177 CT ABD & PELVIS W/CONTRAST: CPT | Performed by: RADIOLOGY

## 2024-02-09 PROCEDURE — 80053 COMPREHEN METABOLIC PANEL: CPT | Mod: IPSPLIT | Performed by: STUDENT IN AN ORGANIZED HEALTH CARE EDUCATION/TRAINING PROGRAM

## 2024-02-09 RX ORDER — SODIUM,POTASSIUM PHOSPHATES 280-250MG
1 POWDER IN PACKET (EA) ORAL 4 TIMES DAILY
Status: DISCONTINUED | OUTPATIENT
Start: 2024-02-09 | End: 2024-02-09 | Stop reason: HOSPADM

## 2024-02-09 RX ADMIN — POTASSIUM & SODIUM PHOSPHATES POWDER PACK 280-160-250 MG 1 PACKET: 280-160-250 PACK at 13:00

## 2024-02-09 RX ADMIN — PANTOPRAZOLE SODIUM 40 MG: 40 INJECTION, POWDER, FOR SOLUTION INTRAVENOUS at 08:25

## 2024-02-09 RX ADMIN — ONDANSETRON 4 MG: 2 INJECTION INTRAMUSCULAR; INTRAVENOUS at 08:26

## 2024-02-09 RX ADMIN — POLYETHYLENE GLYCOL 3350 17 G: 17 POWDER, FOR SOLUTION ORAL at 08:25

## 2024-02-09 RX ADMIN — IOHEXOL 75 ML: 350 INJECTION, SOLUTION INTRAVENOUS at 12:15

## 2024-02-09 RX ADMIN — SODIUM CHLORIDE, POTASSIUM CHLORIDE, SODIUM LACTATE AND CALCIUM CHLORIDE 125 ML/HR: 600; 310; 30; 20 INJECTION, SOLUTION INTRAVENOUS at 06:50

## 2024-02-09 ASSESSMENT — COGNITIVE AND FUNCTIONAL STATUS - GENERAL
MOBILITY SCORE: 24
DAILY ACTIVITIY SCORE: 24

## 2024-02-09 ASSESSMENT — PAIN SCALES - GENERAL
PAINLEVEL_OUTOF10: 0 - NO PAIN
PAINLEVEL_OUTOF10: 0 - NO PAIN

## 2024-02-09 ASSESSMENT — PAIN - FUNCTIONAL ASSESSMENT
PAIN_FUNCTIONAL_ASSESSMENT: 0-10
PAIN_FUNCTIONAL_ASSESSMENT: 0-10

## 2024-02-09 NOTE — DISCHARGE SUMMARY
"Discharge Diagnosis  Recurrent acute pancreatitis    Issues Requiring Follow-Up  GI  Contrast CT of the abdomen    Test Results Pending At Discharge  Pending Labs       No current pending labs.            Hospital Course  Luis Bucio \"Hernán\" is a 63 y.o. male who presented to [facility] ED due to nausea, vomiting, and abdominal pain. Patient states that he has been dealing with nausea and vomiting on and off for months now. His abdomen is usually bloated and feels full, leading to mild to moderate pain/discomfort throughout. Eating makes him feel worse at times and has noticed a decrease in appetite. He states he can't taste food as well but isn't sure why. Patient admits to a near 20 lb weight loss in the past 6-8 months but denies night sweats, fevers, and chills. He had a recent hospitalization for treatment of pancreatitis in December. Patient also admits to running out of all of his medications and has gone without them since being discharged from [facility] in December. Currently feels very nauseous and has thrown up 2-3 times overnight into the morning while here. He states to have attempted to eat his breakfast and drink Ensure but it made him feel worse. Denies headaches, vision changes, dizziness, palpitations, chest pain, but does admit to feeling short of breath and fatigued. No recent sick contacts.       Today, patient is alert and oriented male in no acute distress eating a clear liquid diet without issues. He admits that he has some generalized abdominal pain in the epigastric area. He is unable to remember if he has had anything for pain. He is unable to remember any of his medications previous to this admission and had a difficult time remembering the frequent visits to the ED for abdominal issues including acute pancreatitis in December and GI bleeding. Patient expressed that he is aware that he needs to follow up with GI as OP but has not done so as of yet. He is currently on IV protonix. IVF " continue . Will plan to advance his diet. Will replace electrolytes with oral k-phos. Will plan for discharge later today if tolerating a regular diet.      Will also obtain contrast abdominal CT if he can tolerate the oral contrast    Pertinent Physical Exam At Time of Discharge  Physical Exam  Constitutional:       General: He is not in acute distress.     Appearance: He is normal weight. He is not ill-appearing, toxic-appearing or diaphoretic.   HENT:      Head: Normocephalic and atraumatic.      Nose: Nose normal.      Mouth/Throat:      Mouth: Mucous membranes are moist.      Pharynx: Oropharynx is clear.   Cardiovascular:      Rate and Rhythm: Normal rate and regular rhythm.      Heart sounds: No murmur heard.     No friction rub. No gallop.   Pulmonary:      Effort: Pulmonary effort is normal. No respiratory distress.      Breath sounds: No stridor. No wheezing, rhonchi or rales.   Chest:      Chest wall: No tenderness.   Abdominal:      General: Abdomen is flat. Bowel sounds are normal. There is no distension.      Palpations: Abdomen is soft. There is no mass.      Tenderness: There is no abdominal tenderness.   Musculoskeletal:         General: Normal range of motion.   Skin:     General: Skin is warm and dry.      Capillary Refill: Capillary refill takes less than 2 seconds.   Neurological:      General: No focal deficit present.      Mental Status: He is alert.   Psychiatric:      Comments: Slow mentation; forgetful; but judgment largely intact         Home Medications     Medication List      START taking these medications     Creon 24,000-76,000 -120,000 unit capsule; Generic drug:   lipase-protease-amylase; Take 2 capsules by mouth 3 times a day with   meals. May also take 1 capsule with snacks for snacks.   metoprolol succinate XL 25 mg 24 hr tablet; Commonly known as:   Toprol-XL; Take 1 tablet (25 mg) by mouth once daily. Do not crush or   chew.   ondansetron 4 mg tablet; Commonly known as:  Zofran; Take 1 tablet (4 mg)   by mouth every 6 hours if needed for nausea or vomiting.   pantoprazole 40 mg EC tablet; Commonly known as: ProtoNix; Take 1 tablet   (40 mg) by mouth once daily. Do not crush, chew, or split.     CHANGE how you take these medications     aspirin 81 mg EC tablet; Commonly known as: Adult Low Dose Aspirin; Take   1 tablet (81 mg) by mouth once daily.; What changed: See the new   instructions.   atorvastatin 40 mg tablet; Commonly known as: Lipitor; Take 1 tablet (40   mg) by mouth once daily.; What changed: medication strength, how much to   take   nitroglycerin 0.4 mg SL tablet; Commonly known as: Nitrostat; Place 1   tablet (0.4 mg) under the tongue every 5 minutes if needed for chest   pain.; What changed: how much to take, when to take this, reasons to take   this     STOP taking these medications     amLODIPine 5 mg tablet; Commonly known as: Norvasc   famotidine 20 mg tablet; Commonly known as: Pepcid   furosemide 20 mg tablet; Commonly known as: Lasix   metoprolol tartrate 25 mg tablet; Commonly known as: Lopressor   ondansetron ODT 4 mg disintegrating tablet; Commonly known as:   Zofran-ODT     Acute pancreatitis c/b intractable nausea~improving  Unintentional weight loss~protein-calorie malnutrition +/- occult process- needs to follow up with GI, which he has failed to do previously.  I explained to the patient that it is imperative that he follow up and he said he would call if he has any difficulty obtaining appointment  Decreased appetite  Acute hypoxic respiratory failure     Plan:   - Admits to 20 lb weight loss and constant abdominal bloating  - Hospitalized at [facility] on 12/25/23 for pancreatitis  - CT A/P revealed mild edema surrounding pancreas suggestive of pancreatitis  - Would benefit from Creon script at discharge. Lower atorvastatin dose to 40 mg.  - Requiring 2L o2 -> wean as tolerated. CXR unremarkable  - Continue aggressive IVF and IV pain + nausea control  -  Needs to establish care with GI and may need heme/onc evaluation?  - Unable to afford medications, poor outpatient follow-up. Will enroll into [facility] for assistance     Chronic Medical Conditions: Continue home meds  Ischemic cardiomyopathy, CAD s/p CABG 2022  CHF  Hematemesis  JENNIFER (noncompliant with CPAP)  HTN  HLD  Outpatient Follow-Up  Future Appointments   Date Time Provider Department Center   7/8/2024  2:00 PM TORI Gutierrez-CNP [id number] [facility]       Oscar Hernandez MD

## 2024-02-09 NOTE — NURSING NOTE
Pt states he has not slept tonight (upon hourly rounds his eyes have been closed). Pt states he has sleep apnea and was diagnosed greater than a year ago and never picked up his CPAP machine. He states he wakes up because he's short of breath. RN explained that with his frequent vomiting he wouldn't be able to wear the CPAP mask but 2 liters nasal cannula was placed to help him rest till morning.

## 2024-02-09 NOTE — CARE PLAN
Problem: Pain  Goal: My pain/discomfort is manageable  Outcome: Met     Problem: Safety  Goal: Patient will be injury free during hospitalization  Outcome: Met  Goal: I will remain free of falls  Outcome: Met     Problem: Daily Care  Goal: Daily care needs are met  Outcome: Met   The patient's goals for the shift include      The clinical goals for the shift include Patient will tolerate eating and drinking through 1900    Over the shift, the patient did not make progress toward the following goals. Barriers to progression include . Recommendations to address these barriers include .

## 2024-02-09 NOTE — CARE PLAN
The patient's goals for the shift include  able to eat/drink without nausea/vomiting    The clinical goals for the shift include less nausea and vomiting    Over the shift, the patient did not make progress toward the following goals. Barriers to progression include continued vomiting after medication or sips of water.

## 2024-02-09 NOTE — PROGRESS NOTES
Hernán Bucio is a 63 y.o. male on day 3 of admission presenting with Recurrent acute pancreatitis.      Subjective   Alert oriented male in no acute distress eating a clear liquid diet without issues. He admits that he has some generalized abdominal pain in the epigastric area. He is unable to remember if he has had anything for pain. He is unable to remember any of his medications previous to this admission and had a difficult time remembering the frequent visits to the ED for abdominal issues including acute pancreatitis in December and GI bleeding. Patient expressed that he is aware that he needs to follow up with GI as OP but has not done so as of yet. He is currently on IV protonix. IVF continue . Will plan to advance his diet. Will replace electrolytes with oral k-phos. Will plan for discharge later today if tolerating a regular diet.        Objective     Last Recorded Vitals  /67 (BP Location: Right arm, Patient Position: Lying)   Pulse 75   Temp 37 °C (98.6 °F) (Temporal)   Resp 18   Wt 91.3 kg (201 lb 4.5 oz)   SpO2 95%   Intake/Output last 3 Shifts:    Intake/Output Summary (Last 24 hours) at 2/9/2024 0914  Last data filed at 2/8/2024 1710  Gross per 24 hour   Intake 1445.84 ml   Output --   Net 1445.84 ml       Admission Weight  Weight: 91.6 kg (201 lb 15.1 oz) (02/06/24 1523)    Daily Weight  02/08/24 : 91.3 kg (201 lb 4.5 oz)    Image Results  ECG 12 lead  Normal sinus rhythm  Possible Anterolateral infarct , age undetermined  ST & T wave abnormality, consider inferior ischemia  Abnormal ECG  When compared with ECG of 06-FEB-2024 15:20, (unconfirmed)  Borderline criteria for Anterolateral infarct are now Present  ST no longer depressed in Anterolateral leads  T wave inversion less evident in Inferior leads  T wave inversion less evident in Lateral leads  ECG 12 lead  Normal sinus rhythm  ST & T wave abnormality, consider inferior ischemia  ST & T wave abnormality, consider anterolateral  ischemia  Prolonged QT  Abnormal ECG  When compared with ECG of 17-JAN-2024 11:57,  T wave inversion now evident in Inferior leads  T wave inversion more evident in Lateral leads  ECG 12 lead  Normal sinus rhythm  Left axis deviation  ST & T wave abnormality, consider inferior ischemia  ST & T wave abnormality, consider anterolateral ischemia  Prolonged QT  Abnormal ECG  When compared with ECG of 06-FEB-2024 15:26, (unconfirmed)  Borderline criteria for Anterolateral infarct are no longer Present  ST now depressed in Anterolateral leads  T wave inversion more evident in Lateral leads    Results for orders placed or performed during the hospital encounter of 02/06/24 (from the past 96 hour(s))   Influenza A, and B PCR   Result Value Ref Range    Flu A Result Not Detected Not Detected    Flu B Result Not Detected Not Detected   Sars-CoV-2 PCR   Result Value Ref Range    Coronavirus 2019, PCR Not Detected Not Detected   ECG 12 lead   Result Value Ref Range    Ventricular Rate 73 BPM    Atrial Rate 73 BPM    [place] Interval 122 ms    QRS Duration 100 ms    QT Interval 454 ms    QTC Calculation(Bazett) 500 ms    P Axis -21 degrees    R Axis -19 degrees    T Axis 252 degrees    QRS Count 12 beats    Q Onset 210 ms    P Onset 149 ms    P Offset 198 ms    T Offset 437 ms    QTC Fredericia 485 ms   ECG 12 lead   Result Value Ref Range    Ventricular Rate 67 BPM    Atrial Rate 67 BPM    [place] Interval 126 ms    QRS Duration 102 ms    QT Interval 472 ms    QTC Calculation(Bazett) 498 ms    P Axis -12 degrees    R Axis -21 degrees    T Axis 197 degrees    QRS Count 11 beats    Q Onset 212 ms    P Onset 149 ms    P Offset 204 ms    T Offset 448 ms    QTC Fredericia 490 ms   CBC and Auto Differential   Result Value Ref Range    WBC 10.5 4.4 - 11.3 x10*3/uL    nRBC 0.0 0.0 - 0.0 /100 WBCs    RBC 4.77 4.50 - 5.90 x10*6/uL    Hemoglobin 16.6 13.5 - 17.5 g/dL    Hematocrit 49.3 41.0 - 52.0 %     (H) 80 - 100 fL    MCH 34.8 (H) 26.0 -  34.0 pg    MCHC 33.7 32.0 - 36.0 g/dL    RDW 13.8 11.5 - 14.5 %    Platelets 169 150 - 450 x10*3/uL    Neutrophils % 67.5 40.0 - 80.0 %    Immature Granulocytes %, Automated 0.2 0.0 - 0.9 %    Lymphocytes % 19.8 13.0 - 44.0 %    Monocytes % 10.1 2.0 - 10.0 %    Eosinophils % 1.7 0.0 - 6.0 %    Basophils % 0.7 0.0 - 2.0 %    Neutrophils Absolute 7.07 1.20 - 7.70 x10*3/uL    Immature Granulocytes Absolute, Automated 0.02 0.00 - 0.70 x10*3/uL    Lymphocytes Absolute 2.08 1.20 - 4.80 x10*3/uL    Monocytes Absolute 1.06 (H) 0.10 - 1.00 x10*3/uL    Eosinophils Absolute 0.18 0.00 - 0.70 x10*3/uL    Basophils Absolute 0.07 0.00 - 0.10 x10*3/uL   Troponin I, High Sensitivity, Initial   Result Value Ref Range    Troponin I, High Sensitivity 18 0 - 20 ng/L   Lipase   Result Value Ref Range    Lipase 138 (H) 9 - 82 U/L   Ammonia   Result Value Ref Range    Ammonia 25 16 - 53 umol/L   Lactate   Result Value Ref Range    Lactate 1.4 0.4 - 2.0 mmol/L   Ethanol   Result Value Ref Range    Alcohol <10 <=10 mg/dL   Sedimentation rate, automated   Result Value Ref Range    Sedimentation Rate 50 (H) 0 - 20 mm/h   Light Blue Top   Result Value Ref Range    Extra Tube Hold for add-ons.    ECG 12 lead   Result Value Ref Range    Ventricular Rate 72 BPM    Atrial Rate 72 BPM    MS Interval 130 ms    QRS Duration 100 ms    QT Interval 452 ms    QTC Calculation(Bazett) 494 ms    P Axis 21 degrees    R Axis -35 degrees    T Axis 201 degrees    QRS Count 12 beats    Q Onset 209 ms    P Onset 144 ms    P Offset 195 ms    T Offset 435 ms    QTC Fredericia 480 ms   Comprehensive metabolic panel   Result Value Ref Range    Glucose 93 74 - 99 mg/dL    Sodium 130 (L) 136 - 145 mmol/L    Potassium 3.2 (L) 3.5 - 5.3 mmol/L    Chloride 83 (L) 98 - 107 mmol/L    Bicarbonate 30 21 - 32 mmol/L    Anion Gap 20 10 - 20 mmol/L    Urea Nitrogen 30 (H) 6 - 23 mg/dL    Creatinine 2.05 (H) 0.50 - 1.30 mg/dL    eGFR 36 (L) >60 mL/min/1.73m*2    Calcium 9.6 8.6 -  10.3 mg/dL    Albumin 4.1 3.4 - 5.0 g/dL    Alkaline Phosphatase 142 (H) 33 - 136 U/L    Total Protein 8.1 6.4 - 8.2 g/dL    AST 74 (H) 9 - 39 U/L    Bilirubin, Total 1.3 (H) 0.0 - 1.2 mg/dL    ALT 67 (H) 10 - 52 U/L   Magnesium   Result Value Ref Range    Magnesium 2.53 (H) 1.60 - 2.40 mg/dL   Troponin, High Sensitivity, 1 Hour   Result Value Ref Range    Troponin I, High Sensitivity 19 0 - 20 ng/L   C-reactive protein   Result Value Ref Range    C-Reactive Protein 6.49 (H) <1.00 mg/dL   TSH with reflex to Free T4 if abnormal   Result Value Ref Range    Thyroid Stimulating Hormone 1.78 0.44 - 3.98 mIU/L   Urinalysis with Reflex Culture and Microscopic   Result Value Ref Range    Color, Urine Rocio (N) Straw, Yellow    Appearance, Urine Hazy (N) Clear    Specific Gravity, Urine 1.018 1.005 - 1.035    pH, Urine 5.0 5.0, 5.5, 6.0, 6.5, 7.0, 7.5, 8.0    Protein, Urine 100 (2+) (N) NEGATIVE mg/dL    Glucose, Urine NEGATIVE NEGATIVE mg/dL    Blood, Urine SMALL (1+) (A) NEGATIVE    Ketones, Urine 5 (TRACE) (A) NEGATIVE mg/dL    Bilirubin, Urine NEGATIVE NEGATIVE    Urobilinogen, Urine 2.0 (N) <2.0 mg/dL    Nitrite, Urine NEGATIVE NEGATIVE    Leukocyte Esterase, Urine NEGATIVE NEGATIVE   Extra Urine Gray Tube   Result Value Ref Range    Extra Tube Hold for add-ons.    Urinalysis Microscopic   Result Value Ref Range    WBC, Urine 1-5 1-5, NONE /HPF    RBC, Urine 6-10 (A) NONE, 1-2, 3-5 /HPF    Squamous Epithelial Cells, Urine 1-9 (SPARSE) Reference range not established. /HPF    Bacteria, Urine 1+ (A) NONE SEEN /HPF    Mucus, Urine FEW Reference range not established. /LPF    Hyaline Casts, Urine OCCASIONAL (A) NONE /LPF    RBC Casts, Urine 2+ (A) NONE /LPF   Drug Screen, Urine   Result Value Ref Range    Amphetamine Screen, Urine Presumptive Negative Presumptive Negative    Barbiturate Screen, Urine Presumptive Negative Presumptive Negative    Benzodiazepines Screen, Urine Presumptive Negative Presumptive Negative     Cannabinoid Screen, Urine Presumptive Positive (A) Presumptive Negative    Cocaine Metabolite Screen, Urine Presumptive Negative Presumptive Negative    Fentanyl Screen, Urine Presumptive Negative Presumptive Negative    Opiate Screen, Urine Presumptive Negative Presumptive Negative    Oxycodone Screen, Urine Presumptive Negative Presumptive Negative    PCP Screen, Urine Presumptive Negative Presumptive Negative   CBC   Result Value Ref Range    WBC 9.9 4.4 - 11.3 x10*3/uL    nRBC 0.0 0.0 - 0.0 /100 WBCs    RBC 3.98 (L) 4.50 - 5.90 x10*6/uL    Hemoglobin 13.8 13.5 - 17.5 g/dL    Hematocrit 39.9 (L) 41.0 - 52.0 %     80 - 100 fL    MCH 34.7 (H) 26.0 - 34.0 pg    MCHC 34.6 32.0 - 36.0 g/dL    RDW 13.9 11.5 - 14.5 %    Platelets 166 150 - 450 x10*3/uL   Magnesium   Result Value Ref Range    Magnesium 2.40 1.60 - 2.40 mg/dL   Hepatic Function Panel   Result Value Ref Range    Albumin 3.5 3.4 - 5.0 g/dL    Bilirubin, Total 1.1 0.0 - 1.2 mg/dL    Bilirubin, Direct 0.3 0.0 - 0.3 mg/dL    Alkaline Phosphatase 119 33 - 136 U/L    ALT 53 (H) 10 - 52 U/L    AST 62 (H) 9 - 39 U/L    Total Protein 7.4 6.4 - 8.2 g/dL   CBC   Result Value Ref Range    WBC 9.4 4.4 - 11.3 x10*3/uL    nRBC 0.0 0.0 - 0.0 /100 WBCs    RBC 3.85 (L) 4.50 - 5.90 x10*6/uL    Hemoglobin 13.4 (L) 13.5 - 17.5 g/dL    Hematocrit 39.1 (L) 41.0 - 52.0 %     (H) 80 - 100 fL    MCH 34.8 (H) 26.0 - 34.0 pg    MCHC 34.3 32.0 - 36.0 g/dL    RDW 13.9 11.5 - 14.5 %    Platelets 156 150 - 450 x10*3/uL   Magnesium   Result Value Ref Range    Magnesium 2.12 1.60 - 2.40 mg/dL   Hepatic Function Panel   Result Value Ref Range    Albumin 3.3 (L) 3.4 - 5.0 g/dL    Bilirubin, Total 1.1 0.0 - 1.2 mg/dL    Bilirubin, Direct 0.4 (H) 0.0 - 0.3 mg/dL    Alkaline Phosphatase 111 33 - 136 U/L    ALT 44 10 - 52 U/L    AST 53 (H) 9 - 39 U/L    Total Protein 6.6 6.4 - 8.2 g/dL   CBC   Result Value Ref Range    WBC 8.5 4.4 - 11.3 x10*3/uL    nRBC 0.0 0.0 - 0.0 /100 WBCs     RBC 3.66 (L) 4.50 - 5.90 x10*6/uL    Hemoglobin 12.8 (L) 13.5 - 17.5 g/dL    Hematocrit 37.2 (L) 41.0 - 52.0 %     (H) 80 - 100 fL    MCH 35.0 (H) 26.0 - 34.0 pg    MCHC 34.4 32.0 - 36.0 g/dL    RDW 14.0 11.5 - 14.5 %    Platelets 130 (L) 150 - 450 x10*3/uL   Magnesium   Result Value Ref Range    Magnesium 1.91 1.60 - 2.40 mg/dL   Hepatic Function Panel   Result Value Ref Range    Albumin 2.9 (L) 3.4 - 5.0 g/dL    Bilirubin, Total 0.9 0.0 - 1.2 mg/dL    Bilirubin, Direct 0.4 (H) 0.0 - 0.3 mg/dL    Alkaline Phosphatase 88 33 - 136 U/L    ALT 34 10 - 52 U/L    AST 43 (H) 9 - 39 U/L    Total Protein 6.3 (L) 6.4 - 8.2 g/dL   Phosphorus   Result Value Ref Range    Phosphorus 1.9 (L) 2.5 - 4.9 mg/dL   Basic Metabolic Panel   Result Value Ref Range    Glucose 83 74 - 99 mg/dL    Sodium 129 (L) 136 - 145 mmol/L    Potassium 3.3 (L) 3.5 - 5.3 mmol/L    Chloride 96 (L) 98 - 107 mmol/L    Bicarbonate 26 21 - 32 mmol/L    Anion Gap 10 10 - 20 mmol/L    Urea Nitrogen 11 6 - 23 mg/dL    Creatinine 1.15 0.50 - 1.30 mg/dL    eGFR 72 >60 mL/min/1.73m*2    Calcium 8.3 (L) 8.6 - 10.3 mg/dL         Physical Exam  Constitutional:       General: He is not in acute distress.     Appearance: Normal appearance. He is normal weight.   HENT:      Head: Normocephalic.      Nose: Nose normal.      Mouth/Throat:      Mouth: Mucous membranes are moist.   Eyes:      Extraocular Movements: Extraocular movements intact.      Pupils: Pupils are equal, round, and reactive to light.   Cardiovascular:      Rate and Rhythm: Normal rate and regular rhythm.      Pulses: Normal pulses.      Heart sounds: Normal heart sounds.   Pulmonary:      Effort: Pulmonary effort is normal.      Breath sounds: Normal breath sounds.   Abdominal:      General: Bowel sounds are normal. There is no distension.      Palpations: Abdomen is soft.      Tenderness: There is no abdominal tenderness. There is no guarding.   Musculoskeletal:         General: Normal range of  motion.      Cervical back: Normal range of motion.      Right lower leg: No edema.      Left lower leg: No edema.   Skin:     General: Skin is warm.      Capillary Refill: Capillary refill takes less than 2 seconds.      Comments: Dry flaky skin  Skin of BLE is thick and with evidence of previous fluid   Neurological:      Mental Status: He is alert and oriented to person, place, and time.      Comments: Unusual forgetfulness  ?early dementia?   Psychiatric:         Mood and Affect: Mood normal.         Behavior: Behavior normal.     Scheduled medications  melatonin, 3 mg, oral, Daily  pantoprazole, 40 mg, intravenous, BID  polyethylene glycol, 17 g, oral, Daily      Continuous medications  lactated Ringer's, 125 mL/hr, Last Rate: 125 mL/hr (02/09/24 0650)      PRN medications  PRN medications: morphine, ondansetron, promethazine (Phenergan) 12.5 mg in sodium chloride 0.9% 50 mL IV      Assessment/Plan      Alert oriented male in no acute distress eating a clear liquid diet without issues. He admits that he has some generalized abdominal pain in the epigastric area. He is unable to remember if he has had anything for pain. He is unable to remember any of his medications previous to this admission and had a difficult time remembering the frequent visits to the ED for abdominal issues including acute pancreatitis in December and GI bleeding. Patient expressed that he is aware that he needs to follow up with GI as OP but has not done so as of yet. He is currently on IV protonix. IVF continue . Will plan to advance his diet. Will replace electrolytes with oral k-phos. Will plan for discharge later today if tolerating a regular diet.     Acute pancreatitis c/b intractable nausea~improving  Unintentional weight loss~protein-calorie malnutrition  Decreased appetite  Acute hypoxic respiratory failure     Plan:   - Admits to 20 lb weight loss and constant abdominal bloating  - Hospitalized at [facility] on 12/25/23 for  pancreatitis  - CT A/P revealed mild edema surrounding pancreas suggestive of pancreatitis  - Would benefit from Creon script at discharge. Lower atorvastatin dose to 40 mg.  - Requiring 2L o2 -> wean as tolerated. CXR unremarkable  - Continue aggressive IVF and IV pain + nausea control  - Needs to establish care with GI and may need heme/onc evaluation?  - Unable to afford medications, poor outpatient follow-up. Will enroll into [facility] for assistance     Chronic Medical Conditions: Continue home meds  Ischemic cardiomyopathy, CAD s/p CABG 2022  CHF  Hematemesis  JENNIFER (noncompliant with CPAP)  HTN  HLD        Malnutrition Diagnosis Status: New  Malnutrition Diagnosis: Mild malnutrition related to acute disease or injury  As Evidenced by: Estimated intake <50% of estimated energy needs for >1 month; Significant unintentional wt loss of 18.4kg (16.7%) in the past 5 weeks; mild-moderate muscle wasting (temporal, quadricep, and gastrocnemius); mild-moderate subcutaneous fat wasting (orbital); complaints of profound weakness; acute pancreatitis  I agree with the dietitian's malnutrition diagnosis.         Quiana Lock, APRPORSCHE-CNP

## 2024-02-11 ENCOUNTER — PATIENT OUTREACH (OUTPATIENT)
Dept: HOME HEALTH SERVICES | Age: 64
End: 2024-02-11
Payer: MEDICAID

## 2024-03-14 DIAGNOSIS — R60.9 EDEMA, UNSPECIFIED TYPE: Primary | ICD-10-CM

## 2024-03-15 RX ORDER — FUROSEMIDE 20 MG/1
20 TABLET ORAL DAILY
Qty: 30 TABLET | Refills: 3 | Status: SHIPPED | OUTPATIENT
Start: 2024-03-15

## 2024-04-24 DIAGNOSIS — Z95.1 S/P CABG X 4: ICD-10-CM

## 2024-04-24 DIAGNOSIS — K21.9 GASTROESOPHAGEAL REFLUX DISEASE, UNSPECIFIED WHETHER ESOPHAGITIS PRESENT: ICD-10-CM

## 2024-04-24 DIAGNOSIS — I25.110: ICD-10-CM

## 2024-04-24 DIAGNOSIS — I25.5 ISCHEMIC CARDIOMYOPATHY: ICD-10-CM

## 2024-04-24 DIAGNOSIS — R11.2 NAUSEA AND VOMITING, UNSPECIFIED VOMITING TYPE: ICD-10-CM

## 2024-04-27 RX ORDER — PANTOPRAZOLE SODIUM 40 MG/1
40 TABLET, DELAYED RELEASE ORAL DAILY
Qty: 30 TABLET | Refills: 2 | Status: SHIPPED | OUTPATIENT
Start: 2024-04-27

## 2024-04-27 RX ORDER — ASPIRIN 81 MG/1
81 TABLET ORAL DAILY
Qty: 30 TABLET | Refills: 2 | Status: SHIPPED | OUTPATIENT
Start: 2024-04-27

## 2024-04-27 RX ORDER — ATORVASTATIN CALCIUM 40 MG/1
40 TABLET, FILM COATED ORAL DAILY
Qty: 30 TABLET | Refills: 2 | Status: SHIPPED | OUTPATIENT
Start: 2024-04-27

## 2024-04-27 RX ORDER — METOPROLOL SUCCINATE 25 MG/1
25 TABLET, EXTENDED RELEASE ORAL DAILY
Qty: 30 TABLET | Refills: 2 | Status: SHIPPED | OUTPATIENT
Start: 2024-04-27

## 2024-05-09 ENCOUNTER — APPOINTMENT (OUTPATIENT)
Dept: CARDIOLOGY | Facility: HOSPITAL | Age: 64
End: 2024-05-09
Payer: MEDICAID

## 2024-05-09 ENCOUNTER — APPOINTMENT (OUTPATIENT)
Dept: RADIOLOGY | Facility: HOSPITAL | Age: 64
End: 2024-05-09
Payer: MEDICAID

## 2024-05-09 ENCOUNTER — HOSPITAL ENCOUNTER (EMERGENCY)
Facility: HOSPITAL | Age: 64
Discharge: HOME | End: 2024-05-09
Attending: EMERGENCY MEDICINE
Payer: MEDICAID

## 2024-05-09 VITALS
HEIGHT: 71 IN | WEIGHT: 230 LBS | SYSTOLIC BLOOD PRESSURE: 132 MMHG | OXYGEN SATURATION: 97 % | RESPIRATION RATE: 15 BRPM | DIASTOLIC BLOOD PRESSURE: 85 MMHG | BODY MASS INDEX: 32.2 KG/M2 | HEART RATE: 71 BPM | TEMPERATURE: 97.9 F

## 2024-05-09 DIAGNOSIS — R60.9 DEPENDENT EDEMA: Primary | ICD-10-CM

## 2024-05-09 LAB
ALBUMIN SERPL BCP-MCNC: 4.5 G/DL (ref 3.4–5)
ALP SERPL-CCNC: 73 U/L (ref 33–136)
ALT SERPL W P-5'-P-CCNC: 13 U/L (ref 10–52)
ANION GAP SERPL CALC-SCNC: 11 MMOL/L (ref 10–20)
AST SERPL W P-5'-P-CCNC: 15 U/L (ref 9–39)
ATRIAL RATE: 67 BPM
BASOPHILS # BLD AUTO: 0.07 X10*3/UL (ref 0–0.1)
BASOPHILS NFR BLD AUTO: 0.8 %
BILIRUB SERPL-MCNC: 0.7 MG/DL (ref 0–1.2)
BNP SERPL-MCNC: 90 PG/ML (ref 0–99)
BUN SERPL-MCNC: 17 MG/DL (ref 6–23)
CALCIUM SERPL-MCNC: 9.4 MG/DL (ref 8.6–10.3)
CARDIAC TROPONIN I PNL SERPL HS: 3 NG/L (ref 0–20)
CARDIAC TROPONIN I PNL SERPL HS: 4 NG/L (ref 0–20)
CHLORIDE SERPL-SCNC: 104 MMOL/L (ref 98–107)
CO2 SERPL-SCNC: 26 MMOL/L (ref 21–32)
CREAT SERPL-MCNC: 1.18 MG/DL (ref 0.5–1.3)
D DIMER PPP FEU-MCNC: 485 NG/ML FEU
EGFRCR SERPLBLD CKD-EPI 2021: 70 ML/MIN/1.73M*2
EOSINOPHIL # BLD AUTO: 0.2 X10*3/UL (ref 0–0.7)
EOSINOPHIL NFR BLD AUTO: 2.2 %
ERYTHROCYTE [DISTWIDTH] IN BLOOD BY AUTOMATED COUNT: 17.3 % (ref 11.5–14.5)
GLUCOSE SERPL-MCNC: 104 MG/DL (ref 74–99)
HCT VFR BLD AUTO: 38.5 % (ref 41–52)
HGB BLD-MCNC: 13.3 G/DL (ref 13.5–17.5)
HOLD SPECIMEN: NORMAL
IMM GRANULOCYTES # BLD AUTO: 0.02 X10*3/UL (ref 0–0.7)
IMM GRANULOCYTES NFR BLD AUTO: 0.2 % (ref 0–0.9)
LYMPHOCYTES # BLD AUTO: 3.27 X10*3/UL (ref 1.2–4.8)
LYMPHOCYTES NFR BLD AUTO: 36.2 %
MCH RBC QN AUTO: 37.8 PG (ref 26–34)
MCHC RBC AUTO-ENTMCNC: 34.5 G/DL (ref 32–36)
MCV RBC AUTO: 109 FL (ref 80–100)
MONOCYTES # BLD AUTO: 0.48 X10*3/UL (ref 0.1–1)
MONOCYTES NFR BLD AUTO: 5.3 %
NEUTROPHILS # BLD AUTO: 4.99 X10*3/UL (ref 1.2–7.7)
NEUTROPHILS NFR BLD AUTO: 55.3 %
NRBC BLD-RTO: 0 /100 WBCS (ref 0–0)
P AXIS: 56 DEGREES
P OFFSET: 194 MS
P ONSET: 129 MS
PLATELET # BLD AUTO: 295 X10*3/UL (ref 150–450)
POTASSIUM SERPL-SCNC: 4.1 MMOL/L (ref 3.5–5.3)
PR INTERVAL: 168 MS
PROT SERPL-MCNC: 7.9 G/DL (ref 6.4–8.2)
Q ONSET: 213 MS
QRS COUNT: 11 BEATS
QRS DURATION: 92 MS
QT INTERVAL: 448 MS
QTC CALCULATION(BAZETT): 473 MS
QTC FREDERICIA: 465 MS
R AXIS: -19 DEGREES
RBC # BLD AUTO: 3.52 X10*6/UL (ref 4.5–5.9)
SODIUM SERPL-SCNC: 137 MMOL/L (ref 136–145)
T AXIS: 83 DEGREES
T OFFSET: 437 MS
VENTRICULAR RATE: 67 BPM
WBC # BLD AUTO: 9 X10*3/UL (ref 4.4–11.3)

## 2024-05-09 PROCEDURE — 36415 COLL VENOUS BLD VENIPUNCTURE: CPT | Performed by: EMERGENCY MEDICINE

## 2024-05-09 PROCEDURE — 84075 ASSAY ALKALINE PHOSPHATASE: CPT | Performed by: EMERGENCY MEDICINE

## 2024-05-09 PROCEDURE — 83880 ASSAY OF NATRIURETIC PEPTIDE: CPT | Performed by: EMERGENCY MEDICINE

## 2024-05-09 PROCEDURE — 71045 X-RAY EXAM CHEST 1 VIEW: CPT | Performed by: RADIOLOGY

## 2024-05-09 PROCEDURE — 84484 ASSAY OF TROPONIN QUANT: CPT | Performed by: EMERGENCY MEDICINE

## 2024-05-09 PROCEDURE — 71045 X-RAY EXAM CHEST 1 VIEW: CPT

## 2024-05-09 PROCEDURE — 85379 FIBRIN DEGRADATION QUANT: CPT | Performed by: EMERGENCY MEDICINE

## 2024-05-09 PROCEDURE — 85025 COMPLETE CBC W/AUTO DIFF WBC: CPT | Performed by: EMERGENCY MEDICINE

## 2024-05-09 PROCEDURE — 93005 ELECTROCARDIOGRAM TRACING: CPT

## 2024-05-09 PROCEDURE — 99283 EMERGENCY DEPT VISIT LOW MDM: CPT | Mod: 25

## 2024-05-09 RX ORDER — FUROSEMIDE 20 MG/1
20 TABLET ORAL DAILY
Qty: 5 TABLET | Refills: 0 | Status: SHIPPED | OUTPATIENT
Start: 2024-05-09 | End: 2024-05-14

## 2024-05-09 ASSESSMENT — PAIN SCALES - GENERAL
PAINLEVEL_OUTOF10: 0 - NO PAIN
PAINLEVEL_OUTOF10: 0 - NO PAIN

## 2024-05-09 ASSESSMENT — PAIN - FUNCTIONAL ASSESSMENT: PAIN_FUNCTIONAL_ASSESSMENT: 0-10

## 2024-05-09 ASSESSMENT — PAIN DESCRIPTION - PROGRESSION: CLINICAL_PROGRESSION: NOT CHANGED

## 2024-05-09 NOTE — DISCHARGE INSTRUCTIONS
Take one half of the Lasix pill extra every morning for 5 days.    Follow-up with your primary care doctor in 7 days for recheck.    Return for worsening symptoms or concerns.

## 2024-05-09 NOTE — ED PROVIDER NOTES
[facility]   ED  Provider Note  5/9/2024 12:01 PM  AC03/AC03      Chief Complaint   Patient presents with    Leg Swelling     Pt reports swelling to bilat lower ext and feet.  Slight pitting edema noted bilat.          History of Present Illness:   Luis Bucio is a 63 y.o. male presenting to the ED for bilateral pedal edema, beginning 2 days ago.  The complaint has been persistent, mild in severity, and worsened by nothing.  Patient denies any change in medication or diet.  He denies leg pain.  He does have bilateral leg swelling slightly more left than on the right.  He has history of coronary disease and previous bypass surgery.  He denies any history of DVT or PE.  He is not currently taking blood thinners.  He is on Lasix 40 mg daily.      Review of Systems:   Pertinent positives and review of systems as noted above.  Remaining 10 review of systems is negative or noncontributory to today's episode of care.  Review of Systems       --------------------------------------------- PAST HISTORY ---------------------------------------------  Past Medical History:   Past Medical History:   Diagnosis Date    Hyperlipemia     Hypertension     Other specified health status     No known problems        Past Surgical History:   Past Surgical History:   Procedure Laterality Date    CORONARY ARTERY BYPASS GRAFT      x 4    OTHER SURGICAL HISTORY  09/06/2016    Leg Incision And Drainage        Social History:   Social History     Social History Narrative    Not on file        Family History: family history is not on file. Unless otherwise noted, family history is non contributory    Patient's Medications   New Prescriptions    No medications on file   Previous Medications    ASPIRIN 81 MG EC TABLET    take 1 tablet by mouth once daily    ATORVASTATIN (LIPITOR) 40 MG TABLET    take 1 tablet by mouth once daily    FUROSEMIDE (LASIX) 20 MG TABLET    take 1 tablet by mouth once daily    LIPASE-PROTEASE-AMYLASE (CREON)  24,000-76,000 -120,000 UNIT CAPSULE    Take 2 capsules by mouth 3 times a day with meals. May also take 1 capsule with snacks for snacks.    METOPROLOL SUCCINATE XL (TOPROL-XL) 25 MG 24 HR TABLET    take 1 tablet by mouth once daily (DO NOT CRUSH OR CHEW)    NITROGLYCERIN (NITROSTAT) 0.4 MG SL TABLET    Place 1 tablet (0.4 mg) under the tongue every 5 minutes if needed for chest pain.    ONDANSETRON (ZOFRAN) 4 MG TABLET    Take 1 tablet (4 mg) by mouth every 6 hours if needed for nausea or vomiting.    PANTOPRAZOLE (PROTONIX) 40 MG EC TABLET    take 1 tablet by mouth once daily (DO NOT CRUSH, CHEW, OR SPLIT)   Modified Medications    No medications on file   Discontinued Medications    No medications on file      The patient’s home medications have been reviewed.    Allergies: Patient has no known allergies.    -------------------------------------------------- RESULTS -------------------------------------------------  All laboratory and radiology results have been personally reviewed by myself   LABS:  Labs Reviewed   CBC WITH AUTO DIFFERENTIAL - Abnormal       Result Value    WBC 9.0      nRBC 0.0      RBC 3.52 (*)     Hemoglobin 13.3 (*)     Hematocrit 38.5 (*)      (*)     MCH 37.8 (*)     MCHC 34.5      RDW 17.3 (*)     Platelets 295      Neutrophils % 55.3      Immature Granulocytes %, Automated 0.2      Lymphocytes % 36.2      Monocytes % 5.3      Eosinophils % 2.2      Basophils % 0.8      Neutrophils Absolute 4.99      Immature Granulocytes Absolute, Automated 0.02      Lymphocytes Absolute 3.27      Monocytes Absolute 0.48      Eosinophils Absolute 0.20      Basophils Absolute 0.07     COMPREHENSIVE METABOLIC PANEL - Abnormal    Glucose 104 (*)     Sodium 137      Potassium 4.1      Chloride 104      Bicarbonate 26      Anion Gap 11      Urea Nitrogen 17      Creatinine 1.18      eGFR 70      Calcium 9.4      Albumin 4.5      Alkaline Phosphatase 73      Total Protein 7.9      AST 15      Bilirubin,  Total 0.7      ALT 13     B-TYPE NATRIURETIC PEPTIDE - Normal    BNP 90      Narrative:        <100 pg/mL - Heart failure unlikely  100-299 pg/mL - Intermediate probability of acute heart                  failure exacerbation. Correlate with clinical                  context and patient history.    >=300 pg/mL - Heart Failure likely. Correlate with clinical                  context and patient history.    BNP testing is performed using different testing methodology at [facility] than at other [facility]. Direct result comparisons should only be made within the same method.      SERIAL TROPONIN-INITIAL - Normal    Troponin I, High Sensitivity 4      Narrative:     Less than 99th percentile of normal range cutoff-  Female and children under 18 years old <14 ng/L; Male <21 ng/L: Negative  Repeat testing should be performed if clinically indicated.     Female and children under 18 years old 14-50 ng/L; Male 21-50 ng/L:  Consistent with possible cardiac damage and possible increased clinical   risk. Serial measurements may help to assess extent of myocardial damage.     >50 ng/L: Consistent with cardiac damage, increased clinical risk and  myocardial infarction. Serial measurements may help assess extent of   myocardial damage.      NOTE: Children less than 1 year old may have higher baseline troponin   levels and results should be interpreted in conjunction with the overall   clinical context.     NOTE: Troponin I testing is performed using a different   testing methodology at [facility] than at other   [facility]. Direct result comparisons should only   be made within the same method.   SERIAL TROPONIN, 1 HOUR - Normal    Troponin I, High Sensitivity 3      Narrative:     Less than 99th percentile of normal range cutoff-  Female and children under 18 years old <14 ng/L; Male <21 ng/L: Negative  Repeat testing should be performed if clinically indicated.     Female and children  under 18 years old 14-50 ng/L; Male 21-50 ng/L:  Consistent with possible cardiac damage and possible increased clinical   risk. Serial measurements may help to assess extent of myocardial damage.     >50 ng/L: Consistent with cardiac damage, increased clinical risk and  myocardial infarction. Serial measurements may help assess extent of   myocardial damage.      NOTE: Children less than 1 year old may have higher baseline troponin   levels and results should be interpreted in conjunction with the overall   clinical context.     NOTE: Troponin I testing is performed using a different   testing methodology at [facility] than at other   [facility]. Direct result comparisons should only   be made within the same method.   D-DIMER, VTE EXCLUSION - Normal    D-Dimer, Quantitative VTE Exclusion 485      Narrative:     The VTE Exclusion D-Dimer assay is reported in ng/mL Fibrinogen Equivalent Units (FEU).    Per 's instructions for use, a value of less than 500 ng/mL (FEU) may help to exclude DVT or PE in outpatients when the assay is used with a clinical pretest probability assessment.(AE must utilize and document eCalc 'Wells Score Deep Vein Thrombosis Risk' for DVT exclusion only. Emergency Department should utilize  Guidelines for Emergency Department Use of the VTE Exclusion D-Dimer and Clinical Pretest probability assessment model for DVT or PE exclusion.)   TROPONIN SERIES- (INITIAL, 1 HR)    Narrative:     The following orders were created for panel order Troponin I Series, High Sensitivity (0, 1 HR).  Procedure                               Abnormality         Status                     ---------                               -----------         ------                     Troponin I, High Sensiti...[[id number]]  Normal              Final result               Troponin, High Sensitivi...[[id number]]  Normal              Final result                 Please view results for these  "tests on the individual orders.   GRAY TOP    Extra Tube Hold for add-ons.       EKG: Sinus rhythm at 67 bpm, normal axis, prolonged QT interval of 4 and 73 ms corrected, nonspecific ST flattening, no acute ST elevations.  Interpreted by CATRACHO Mead MD    RADIOLOGY:  Interpreted by Radiologist.  XR chest 1 view   Final Result   No evidence of acute intrathoracic abnormality.        Signed by: Adan Bunn 5/9/2024 12:32 PM   Dictation workstation:   [id number]          Encounter Date: 05/09/24   ECG 12 lead   Result Value    Ventricular Rate 67    Atrial Rate 67    FL Interval 168    QRS Duration 92    QT Interval 448    QTC Calculation(Bazett) 473    P Axis 56    R Axis -19    T Axis 83    QRS Count 11    Q Onset 213    P Onset 129    P Offset 194    T Offset 437    QTC Fredericia 465    Narrative    Normal sinus rhythm  Nonspecific T wave abnormality  Prolonged QT  Abnormal ECG  When compared with ECG of 06-FEB-2024 16:28,  ST no longer depressed in Anterior leads  T wave inversion no longer evident in Inferior leads  T wave inversion no longer evident in Anterolateral leads  See ED provider note for full interpretation and clinical correlation  Confirmed by Luisa Hill ([id number]) on 5/9/2024 2:32:50 PM     ------------------------- NURSING NOTES AND VITALS REVIEWED ---------------------------   The nursing notes within the ED encounter and vital signs as below have been reviewed.   /80   Pulse 74   Temp 36.6 °C (97.9 °F)   Resp 18   Ht 1.803 m (5' 11\")   Wt 104 kg (230 lb)   SpO2 94%   BMI 32.08 kg/m²   Oxygen Saturation Interpretation: Normal      ---------------------------------------------------PHYSICAL EXAM--------------------------------------  Physical Exam   Constitutional/General: Alert and oriented x3, well appearing, non toxic in NAD  Head: Normocephalic and atraumatic  Eyes: PERRL, EOMI, conjunctiva normal, sclera non icteric  Mouth: Oropharynx clear, handling secretions, no " trismus, no asymmetry of the posterior oropharynx or uvular edema  Neck: Supple, full ROM, non tender to palpation in the midline, no stridor, no crepitus, no meningeal signs  Respiratory: Lungs clear to auscultation bilaterally, no wheezes, rales, or rhonchi. Not in respiratory distress  Cardiovascular:  Regular rate. Regular rhythm. No murmurs, gallops, or rubs. 2+ distal pulses, mild 2+ bilateral lower extremity edema without cord or Homans' sign  Chest: No chest wall tenderness  GI:  Abdomen Soft, Non tender, Non distended.  +BS. No organomegaly, no palpable masses,  No rebound, guarding, or rigidity.   Musculoskeletal: Moves all extremities x 4. Warm and well perfused, no clubbing, cyanosis, or edema. Capillary refill <3 seconds  Integument: skin warm and dry. No rashes.   Lymphatic: no lymphadenopathy noted  Neurologic: No focal deficits, symmetric strength 5/5 in the upper and lower extremities bilaterally  Psychiatric: Normal Affect    Procedures    ------------------------------ ED COURSE/MEDICAL DECISION MAKING----------------------  Diagnoses as of 05/09/24 1528   Dependent edema      Patient complains of increasing pedal edema over the past several days.  He denies any change in medication or diet.  He denies any fever or chills.  He has no leg pain.  There is no history of DVT or PE.  He does take Lasix routinely.  His exam reveals mild swelling of both lower extremities below the knee.  There is no cord or Homans' sign.  His hearing enzymes are normal.  His D-dimer testing is normal.  His chest x-ray is clear.  Patient has dependent edema and is stable for outpatient management.      Medical Decision Making:   Discharged to home  Diagnoses as of 05/09/24 1528   Dependent edema      Counseling:   The emergency provider has spoken with the patient and discussed today’s results, in addition to providing specific details for the plan of care and counseling regarding the diagnosis and prognosis.  Questions  are answered at this time and they are agreeable with the plan.      --------------------------------- IMPRESSION AND DISPOSITION ---------------------------------        IMPRESSION  1. Dependent edema        DISPOSITION  Disposition: Discharge to home  Patient condition is fair      Billing Provider Critical Care Time: 0 minutes     Bobby Mead MD  05/09/24 1523

## 2024-07-08 ENCOUNTER — APPOINTMENT (OUTPATIENT)
Dept: CARDIOLOGY | Facility: CLINIC | Age: 64
End: 2024-07-08
Payer: MEDICAID

## 2024-07-08 VITALS
OXYGEN SATURATION: 97 % | BODY MASS INDEX: 34.62 KG/M2 | WEIGHT: 248.24 LBS | DIASTOLIC BLOOD PRESSURE: 83 MMHG | SYSTOLIC BLOOD PRESSURE: 127 MMHG | HEART RATE: 79 BPM

## 2024-07-08 DIAGNOSIS — I25.5 ISCHEMIC CARDIOMYOPATHY: ICD-10-CM

## 2024-07-08 DIAGNOSIS — Z95.1 S/P CABG X 4: ICD-10-CM

## 2024-07-08 DIAGNOSIS — I25.110: ICD-10-CM

## 2024-07-08 DIAGNOSIS — R11.2 NAUSEA AND VOMITING, UNSPECIFIED VOMITING TYPE: ICD-10-CM

## 2024-07-08 DIAGNOSIS — I10 PRIMARY HYPERTENSION: Primary | ICD-10-CM

## 2024-07-08 PROCEDURE — 3079F DIAST BP 80-89 MM HG: CPT | Performed by: NURSE PRACTITIONER

## 2024-07-08 PROCEDURE — 1036F TOBACCO NON-USER: CPT | Performed by: NURSE PRACTITIONER

## 2024-07-08 PROCEDURE — 99214 OFFICE O/P EST MOD 30 MIN: CPT | Performed by: NURSE PRACTITIONER

## 2024-07-08 PROCEDURE — 3074F SYST BP LT 130 MM HG: CPT | Performed by: NURSE PRACTITIONER

## 2024-07-08 RX ORDER — AMLODIPINE BESYLATE 5 MG/1
1 TABLET ORAL
COMMUNITY
Start: 2024-06-27 | End: 2024-07-08 | Stop reason: SDUPTHER

## 2024-07-08 RX ORDER — AMLODIPINE BESYLATE 5 MG/1
5 TABLET ORAL
Qty: 90 TABLET | Refills: 3 | Status: SHIPPED | OUTPATIENT
Start: 2024-07-08 | End: 2025-07-08

## 2024-07-08 RX ORDER — ATORVASTATIN CALCIUM 80 MG/1
80 TABLET, FILM COATED ORAL NIGHTLY
COMMUNITY
Start: 2024-04-18 | End: 2024-07-08 | Stop reason: ALTCHOICE

## 2024-07-08 RX ORDER — ASPIRIN 81 MG/1
81 TABLET ORAL DAILY
Qty: 90 TABLET | Refills: 3 | Status: SHIPPED | OUTPATIENT
Start: 2024-07-08 | End: 2025-07-08

## 2024-07-08 RX ORDER — METOPROLOL SUCCINATE 25 MG/1
25 TABLET, EXTENDED RELEASE ORAL DAILY
Qty: 90 TABLET | Refills: 3 | Status: SHIPPED | OUTPATIENT
Start: 2024-07-08 | End: 2025-07-08

## 2024-07-08 RX ORDER — ATORVASTATIN CALCIUM 40 MG/1
40 TABLET, FILM COATED ORAL DAILY
Qty: 90 TABLET | Refills: 3 | Status: SHIPPED | OUTPATIENT
Start: 2024-07-08 | End: 2025-07-08

## 2024-07-08 NOTE — PROGRESS NOTES
Primary Care Physician: Michaelle Knight DO  Primary Cardiologist:       Date of Visit: 07/08/2024  2:00 PM EDT  Location of visit:  [facility]   Type of Visit: Follow up             Chief Complaint   Patient presents with    Follow-up     6 month follow up          HPI / Summary:   Luis Bucio is a 64 y.o. male  with   CAD s/p CABG x4 (LIMA to LAD, radial sequential to obtuse marginal 1 and obtuse marginal 2, and saphenous vein graft to PDA.Endoscopic vein and radial artery harvest. Left atrial appendage exclusion with AtriClip 40) 7/22/2022, Dr. Maier, HTN, HLD, moderately reduced LV systolic function 45-50%, post op AFib --> NSR.   who returns for  hospital follow up         No angina or unusual dyspnea.  He walks frequently,  develops some lower extremity edema when standing for long periods L > R   Main C/o abdominal discomfort       12 system review is negative except as noted above         Vitals:    07/08/24 1351   Weight: 113 kg (248 lb 3.8 oz)       Medical History:   Past Medical History:   Diagnosis Date    Hyperlipemia     Hypertension     Other specified health status     No known problems       Social History:   Tobacco Use: Medium Risk (7/8/2024)    Patient History     Smoking Tobacco Use: Former     Smokeless Tobacco Use: Never     Passive Exposure: Not on file         MEDICATIONS:   Current Outpatient Medications   Medication Instructions    amLODIPine (NORVASC) 5 mg, oral, Daily (0630)    aspirin 81 mg, oral, Daily    atorvastatin (LIPITOR) 40 mg, oral, Daily    lipase-protease-amylase (Creon) 24,000-76,000 -120,000 unit capsule Take 2 capsules by mouth 3 times a day with meals. May also take 1 capsule with snacks for snacks.    metoprolol succinate XL (TOPROL-XL) 25 mg, oral, Daily, Do not crush, split or chew    nitroglycerin (NITROSTAT) 0.4 mg, sublingual, Every 5 min PRN    pantoprazole (PROTONIX) 40 mg, oral, Daily, DO NOT CRUSH CHEW OR SPLIT         IMAGING REVIEWED:      ECHOCARDIOGRAM  5/24/2023  CONCLUSIONS:  1. Left ventricular systolic function is normal with a 60-65% estimated  ejection fraction.  2. Spectral Doppler shows an impaired relaxation pattern of left ventricular  diastolic filling.  3. There is moderate mitral annular calcification.  4. RVSP within normal limits.  5. Aortic valve appears abnormal.  6. Mild to moderate aortic valve stenosis.  7. There is moderate aortic valve cusp calcification.        Cardiovascular Catheterization Report 6/30/2022  CONCLUSIONS:  1. Left main: no significant angiographic disease.  2. LAD: 70% prox-mid disease.  3. LCx: 70% mid-vessel disease, 100% proximal thrombotic OM1 that fills via left to left collaterals.  4. RCA: 80% distal stenosis.  5. LVEDP 12mmHg, mild aortic stenosis on LV-Ao gradient.      LABS:  CBC with Differential:    Lab Results   Component Value Date    WBC 9.0 05/09/2024    RBC 3.52 (L) 05/09/2024    HGB 13.3 (L) 05/09/2024    HCT 38.5 (L) 05/09/2024     05/09/2024     (H) 05/09/2024    MCH 37.8 (H) 05/09/2024    MCHC 34.5 05/09/2024    RDW 17.3 (H) 05/09/2024    NRBC 0.0 05/09/2024    LYMPHOPCT 36.2 05/09/2024    MONOPCT 5.3 05/09/2024    EOSPCT 2.2 05/09/2024    BASOPCT 0.8 05/09/2024    MONOSABS 0.48 05/09/2024    LYMPHSABS 3.27 05/09/2024    EOSABS 0.20 05/09/2024    BASOSABS 0.07 05/09/2024     CMP:    Lab Results   Component Value Date     05/09/2024    K 4.1 05/09/2024     05/09/2024    CO2 26 05/09/2024    BUN 17 05/09/2024    CREATININE 1.18 05/09/2024    GLUCOSE 104 (H) 05/09/2024    PROT 7.9 05/09/2024    CALCIUM 9.4 05/09/2024    BILITOT 0.7 05/09/2024    ALKPHOS 73 05/09/2024    AST 15 05/09/2024    ALT 13 05/09/2024     BMP:    Lab Results   Component Value Date     05/09/2024    K 4.1 05/09/2024     05/09/2024    CO2 26 05/09/2024    BUN 17 05/09/2024    CREATININE 1.18 05/09/2024    CALCIUM 9.4 05/09/2024    GLUCOSE 104 (H) 05/09/2024     Magnesium:  Lab Results   Component  Value Date    MG 1.91 02/09/2024     Troponin:    Lab Results   Component Value Date    TROPHS 3 05/09/2024    TROPHS 4 05/09/2024    TROPHS 19 02/06/2024     BNP:   Lab Results   Component Value Date    BNP 90 05/09/2024         Lipid Panel:  Lab Results   Component Value Date    HDL 24.6 12/21/2023    CHHDL 8.3 12/21/2023    VLDL 24 12/21/2023    TRIG 119 12/21/2023    NHDL 180 (H) 12/21/2023        Lab work and imaging results independently reviewed by me     Visit Vitals  /83   Pulse 79   Wt 113 kg (248 lb 3.8 oz)   SpO2 97%   BMI 34.62 kg/m²   Smoking Status Former   BSA 2.38 m²         ECG dated 12/17/2023 independently reviewed   SR 62, LVH       Constitutional:       Appearance: Healthy appearance. Not in distress.   Eyes:      Conjunctiva/sclera: Conjunctivae normal.   Neck:      Vascular: JVD normal.   Pulmonary:      Effort: Pulmonary effort is normal.      Breath sounds: Normal breath sounds.   Cardiovascular:      PMI at left midclavicular line. Normal rate. Regular rhythm. Normal S1. Normal S2.       Murmurs: There is no murmur.      No rub.   Pulses:     Intact distal pulses.   Edema:     Peripheral edema absent.   Abdominal:      General: Bowel sounds are normal.   Musculoskeletal:      Cervical back: Neck supple. Skin:     General: Skin is warm and dry.   Neurological:      Mental Status: Alert and oriented to person, place and time.           Problem List Items Addressed This Visit             ICD-10-CM    Athscl heart disease of native cor art w unstable ang pctrs (Multi) I25.110    Relevant Medications    amLODIPine (Norvasc) 5 mg tablet    atorvastatin (Lipitor) 40 mg tablet    metoprolol succinate XL (Toprol-XL) 25 mg 24 hr tablet    aspirin 81 mg EC tablet    Ischemic cardiomyopathy I25.5    Relevant Medications    amLODIPine (Norvasc) 5 mg tablet    atorvastatin (Lipitor) 40 mg tablet    metoprolol succinate XL (Toprol-XL) 25 mg 24 hr tablet    aspirin 81 mg EC tablet    Hypertension -  Primary I10    Relevant Medications    amLODIPine (Norvasc) 5 mg tablet    S/P CABG x 4 Z95.1    Relevant Medications    atorvastatin (Lipitor) 40 mg tablet     Other Visit Diagnoses         Codes    Nausea and vomiting, unspecified vomiting type     R11.2    Relevant Medications    metoprolol succinate XL (Toprol-XL) 25 mg 24 hr tablet          No angina or symptoms suggestive of HF   Continue current cardiac Rx which was refilled       Encouraged F/U with PCP for abdominal discomfort with hx of pancreatitis         07/08/24 at 2:18 PM - YUNIEL Gutierrez      Orders:  No orders of the defined types were placed in this encounter.        Followup Appts:  Future Appointments   Date Time Provider Department Center   1/13/2025  2:00 PM YUNIEL Gutierrez [id number]

## 2024-07-15 DIAGNOSIS — K21.9 GASTROESOPHAGEAL REFLUX DISEASE, UNSPECIFIED WHETHER ESOPHAGITIS PRESENT: ICD-10-CM

## 2024-07-17 ENCOUNTER — LAB (OUTPATIENT)
Dept: LAB | Facility: LAB | Age: 64
End: 2024-07-17
Payer: MEDICAID

## 2024-07-17 DIAGNOSIS — I10 ESSENTIAL (PRIMARY) HYPERTENSION: Primary | ICD-10-CM

## 2024-07-17 DIAGNOSIS — E78.2 MIXED HYPERLIPIDEMIA: ICD-10-CM

## 2024-07-17 DIAGNOSIS — R73.9 HYPERGLYCEMIA, UNSPECIFIED: ICD-10-CM

## 2024-07-17 LAB
ALBUMIN SERPL BCP-MCNC: 4.4 G/DL (ref 3.4–5)
ALP SERPL-CCNC: 105 U/L (ref 33–136)
ALT SERPL W P-5'-P-CCNC: 27 U/L (ref 10–52)
ANION GAP SERPL CALC-SCNC: 11 MMOL/L (ref 10–20)
AST SERPL W P-5'-P-CCNC: 20 U/L (ref 9–39)
BASOPHILS # BLD AUTO: 0.08 X10*3/UL (ref 0–0.1)
BASOPHILS NFR BLD AUTO: 0.9 %
BILIRUB SERPL-MCNC: 0.9 MG/DL (ref 0–1.2)
BUN SERPL-MCNC: 21 MG/DL (ref 6–23)
CALCIUM SERPL-MCNC: 9.5 MG/DL (ref 8.6–10.3)
CHLORIDE SERPL-SCNC: 104 MMOL/L (ref 98–107)
CHOLEST SERPL-MCNC: 152 MG/DL (ref 0–199)
CHOLESTEROL/HDL RATIO: 5.6
CO2 SERPL-SCNC: 28 MMOL/L (ref 21–32)
CREAT SERPL-MCNC: 1.28 MG/DL (ref 0.5–1.3)
EGFRCR SERPLBLD CKD-EPI 2021: 63 ML/MIN/1.73M*2
EOSINOPHIL # BLD AUTO: 0.13 X10*3/UL (ref 0–0.7)
EOSINOPHIL NFR BLD AUTO: 1.4 %
ERYTHROCYTE [DISTWIDTH] IN BLOOD BY AUTOMATED COUNT: 11.7 % (ref 11.5–14.5)
EST. AVERAGE GLUCOSE BLD GHB EST-MCNC: 117 MG/DL
FERRITIN SERPL-MCNC: 117 NG/ML (ref 20–300)
GLUCOSE SERPL-MCNC: 118 MG/DL (ref 74–99)
HBA1C MFR BLD: 5.7 %
HCT VFR BLD AUTO: 42 % (ref 41–52)
HDLC SERPL-MCNC: 27.1 MG/DL
HGB BLD-MCNC: 14.2 G/DL (ref 13.5–17.5)
IMM GRANULOCYTES # BLD AUTO: 0.02 X10*3/UL (ref 0–0.7)
IMM GRANULOCYTES NFR BLD AUTO: 0.2 % (ref 0–0.9)
IRON SATN MFR SERPL: 34 % (ref 25–45)
IRON SERPL-MCNC: 123 UG/DL (ref 35–150)
LDLC SERPL CALC-MCNC: 106 MG/DL
LYMPHOCYTES # BLD AUTO: 2.9 X10*3/UL (ref 1.2–4.8)
LYMPHOCYTES NFR BLD AUTO: 32.3 %
MCH RBC QN AUTO: 36.9 PG (ref 26–34)
MCHC RBC AUTO-ENTMCNC: 33.8 G/DL (ref 32–36)
MCV RBC AUTO: 109 FL (ref 80–100)
MONOCYTES # BLD AUTO: 0.62 X10*3/UL (ref 0.1–1)
MONOCYTES NFR BLD AUTO: 6.9 %
NEUTROPHILS # BLD AUTO: 5.23 X10*3/UL (ref 1.2–7.7)
NEUTROPHILS NFR BLD AUTO: 58.3 %
NON HDL CHOLESTEROL: 125 MG/DL (ref 0–149)
NRBC BLD-RTO: 0 /100 WBCS (ref 0–0)
PLATELET # BLD AUTO: 267 X10*3/UL (ref 150–450)
POTASSIUM SERPL-SCNC: 4.5 MMOL/L (ref 3.5–5.3)
PROT SERPL-MCNC: 7.3 G/DL (ref 6.4–8.2)
RBC # BLD AUTO: 3.85 X10*6/UL (ref 4.5–5.9)
SODIUM SERPL-SCNC: 138 MMOL/L (ref 136–145)
TIBC SERPL-MCNC: 365 UG/DL (ref 240–445)
TRIGL SERPL-MCNC: 97 MG/DL (ref 0–149)
UIBC SERPL-MCNC: 242 UG/DL (ref 110–370)
VLDL: 19 MG/DL (ref 0–40)
WBC # BLD AUTO: 9 X10*3/UL (ref 4.4–11.3)

## 2024-07-17 PROCEDURE — 85025 COMPLETE CBC W/AUTO DIFF WBC: CPT

## 2024-07-17 PROCEDURE — 80061 LIPID PANEL: CPT

## 2024-07-17 PROCEDURE — 83036 HEMOGLOBIN GLYCOSYLATED A1C: CPT

## 2024-07-17 PROCEDURE — 36415 COLL VENOUS BLD VENIPUNCTURE: CPT

## 2024-07-17 PROCEDURE — 80053 COMPREHEN METABOLIC PANEL: CPT

## 2024-07-17 PROCEDURE — 82728 ASSAY OF FERRITIN: CPT

## 2024-07-17 PROCEDURE — 83540 ASSAY OF IRON: CPT

## 2024-07-17 PROCEDURE — 83550 IRON BINDING TEST: CPT

## 2024-07-17 RX ORDER — PANTOPRAZOLE SODIUM 40 MG/1
40 TABLET, DELAYED RELEASE ORAL DAILY
Qty: 30 TABLET | Refills: 2 | Status: SHIPPED | OUTPATIENT
Start: 2024-07-17

## 2024-09-04 ENCOUNTER — LAB (OUTPATIENT)
Dept: LAB | Facility: LAB | Age: 64
End: 2024-09-04
Payer: MEDICAID

## 2024-09-04 ENCOUNTER — TELEPHONE (OUTPATIENT)
Dept: CARDIOLOGY | Facility: CLINIC | Age: 64
End: 2024-09-04

## 2024-09-04 DIAGNOSIS — I50.42 CHRONIC COMBINED SYSTOLIC AND DIASTOLIC HEART FAILURE (MULTI): ICD-10-CM

## 2024-09-04 DIAGNOSIS — R60.0 EDEMA LEG: ICD-10-CM

## 2024-09-04 DIAGNOSIS — R06.09 DYSPNEA ON EXERTION: ICD-10-CM

## 2024-09-04 LAB
ALBUMIN SERPL BCP-MCNC: 4.1 G/DL (ref 3.4–5)
ALP SERPL-CCNC: 79 U/L (ref 33–136)
ALT SERPL W P-5'-P-CCNC: 19 U/L (ref 10–52)
ANION GAP SERPL CALC-SCNC: 10 MMOL/L (ref 10–20)
AST SERPL W P-5'-P-CCNC: 17 U/L (ref 9–39)
BASOPHILS # BLD AUTO: 0.07 X10*3/UL (ref 0–0.1)
BASOPHILS NFR BLD AUTO: 0.7 %
BILIRUB SERPL-MCNC: 0.7 MG/DL (ref 0–1.2)
BNP SERPL-MCNC: 52 PG/ML (ref 0–99)
BUN SERPL-MCNC: 14 MG/DL (ref 6–23)
CALCIUM SERPL-MCNC: 9.1 MG/DL (ref 8.6–10.3)
CHLORIDE SERPL-SCNC: 107 MMOL/L (ref 98–107)
CO2 SERPL-SCNC: 26 MMOL/L (ref 21–32)
CREAT SERPL-MCNC: 1.27 MG/DL (ref 0.5–1.3)
EGFRCR SERPLBLD CKD-EPI 2021: 63 ML/MIN/1.73M*2
EOSINOPHIL # BLD AUTO: 0.1 X10*3/UL (ref 0–0.7)
EOSINOPHIL NFR BLD AUTO: 1 %
ERYTHROCYTE [DISTWIDTH] IN BLOOD BY AUTOMATED COUNT: 13.1 % (ref 11.5–14.5)
GLUCOSE SERPL-MCNC: 178 MG/DL (ref 74–99)
HCT VFR BLD AUTO: 41.3 % (ref 41–52)
HGB BLD-MCNC: 14 G/DL (ref 13.5–17.5)
IMM GRANULOCYTES NFR BLD AUTO: 0.3 % (ref 0–0.9)
LYMPHOCYTES # BLD AUTO: 2.68 X10*3/UL (ref 1.2–4.8)
LYMPHOCYTES NFR BLD AUTO: 26.9 %
MCHC RBC AUTO-ENTMCNC: 36 G/DL (ref 32–36)
MCV RBC AUTO: 106 FL (ref 80–100)
MONOCYTES # BLD AUTO: 0.57 X10*3/UL (ref 0.1–1)
MONOCYTES NFR BLD AUTO: 53.7 %
NEUTROPHILS # BLD AUTO: 6.51 X10*3/UL (ref 1.2–7.7)
NEUTROPHILS NFR BLD AUTO: 65.4 %
PLATELET # BLD AUTO: 262 X10*3/UL (ref 150–450)
POTASSIUM SERPL-SCNC: 4.1 MMOL/L (ref 3.5–5.3)
PROT SERPL-MCNC: 7 G/DL (ref 6.4–8.2)
RBC # BLD AUTO: 3.89 X10*6/UL (ref 4.5–5.9)
SODIUM SERPL-SCNC: 139 MMOL/L (ref 136–145)
WBC # BLD AUTO: 10 X10*3/UL (ref 4.4–11.3)

## 2024-09-04 PROCEDURE — 83880 ASSAY OF NATRIURETIC PEPTIDE: CPT

## 2024-09-04 PROCEDURE — 80053 COMPREHEN METABOLIC PANEL: CPT

## 2024-09-04 PROCEDURE — 85025 COMPLETE CBC W/AUTO DIFF WBC: CPT

## 2024-09-04 PROCEDURE — 36415 COLL VENOUS BLD VENIPUNCTURE: CPT

## 2024-10-06 ENCOUNTER — APPOINTMENT (OUTPATIENT)
Dept: CARDIOLOGY | Facility: HOSPITAL | Age: 64
End: 2024-10-06
Payer: MEDICAID

## 2024-10-06 ENCOUNTER — APPOINTMENT (OUTPATIENT)
Dept: RADIOLOGY | Facility: HOSPITAL | Age: 64
End: 2024-10-06
Payer: MEDICAID

## 2024-10-06 ENCOUNTER — HOSPITAL ENCOUNTER (EMERGENCY)
Facility: HOSPITAL | Age: 64
Discharge: HOME | End: 2024-10-06
Attending: EMERGENCY MEDICINE
Payer: MEDICAID

## 2024-10-06 VITALS
OXYGEN SATURATION: 94 % | RESPIRATION RATE: 18 BRPM | WEIGHT: 246.91 LBS | HEART RATE: 81 BPM | TEMPERATURE: 97.5 F | DIASTOLIC BLOOD PRESSURE: 98 MMHG | BODY MASS INDEX: 35.35 KG/M2 | HEIGHT: 70 IN | SYSTOLIC BLOOD PRESSURE: 141 MMHG

## 2024-10-06 DIAGNOSIS — R10.11 RIGHT UPPER QUADRANT ABDOMINAL PAIN: Primary | ICD-10-CM

## 2024-10-06 DIAGNOSIS — K80.20 CALCULUS OF GALLBLADDER WITHOUT CHOLECYSTITIS WITHOUT OBSTRUCTION: ICD-10-CM

## 2024-10-06 DIAGNOSIS — K80.50 BILIARY COLIC: ICD-10-CM

## 2024-10-06 DIAGNOSIS — R11.2 NAUSEA AND VOMITING, UNSPECIFIED VOMITING TYPE: ICD-10-CM

## 2024-10-06 LAB
ALBUMIN SERPL BCP-MCNC: 4.1 G/DL (ref 3.4–5)
ALP SERPL-CCNC: 89 U/L (ref 33–136)
ALT SERPL W P-5'-P-CCNC: 21 U/L (ref 10–52)
ANION GAP SERPL CALC-SCNC: 11 MMOL/L (ref 10–20)
AST SERPL W P-5'-P-CCNC: 18 U/L (ref 9–39)
BASOPHILS # BLD AUTO: 0.08 X10*3/UL (ref 0–0.1)
BASOPHILS NFR BLD AUTO: 0.7 %
BILIRUB SERPL-MCNC: 0.5 MG/DL (ref 0–1.2)
BUN SERPL-MCNC: 16 MG/DL (ref 6–23)
CALCIUM SERPL-MCNC: 9 MG/DL (ref 8.6–10.3)
CARDIAC TROPONIN I PNL SERPL HS: 5 NG/L (ref 0–20)
CARDIAC TROPONIN I PNL SERPL HS: 5 NG/L (ref 0–20)
CHLORIDE SERPL-SCNC: 103 MMOL/L (ref 98–107)
CO2 SERPL-SCNC: 28 MMOL/L (ref 21–32)
CREAT SERPL-MCNC: 1.24 MG/DL (ref 0.5–1.3)
EGFRCR SERPLBLD CKD-EPI 2021: 65 ML/MIN/1.73M*2
EOSINOPHIL # BLD AUTO: 0.12 X10*3/UL (ref 0–0.7)
EOSINOPHIL NFR BLD AUTO: 1.1 %
ERYTHROCYTE [DISTWIDTH] IN BLOOD BY AUTOMATED COUNT: 14 % (ref 11.5–14.5)
GLUCOSE SERPL-MCNC: 135 MG/DL (ref 74–99)
HCT VFR BLD AUTO: 41.1 % (ref 41–52)
HGB BLD-MCNC: 14.1 G/DL (ref 13.5–17.5)
HOLD SPECIMEN: NORMAL
IMM GRANULOCYTES # BLD AUTO: 0.04 X10*3/UL (ref 0–0.7)
IMM GRANULOCYTES NFR BLD AUTO: 0.4 % (ref 0–0.9)
LIPASE SERPL-CCNC: 52 U/L (ref 9–82)
LYMPHOCYTES # BLD AUTO: 2.45 X10*3/UL (ref 1.2–4.8)
LYMPHOCYTES NFR BLD AUTO: 21.5 %
MAGNESIUM SERPL-MCNC: 2.03 MG/DL (ref 1.6–2.4)
MCH RBC QN AUTO: 35.7 PG (ref 26–34)
MCHC RBC AUTO-ENTMCNC: 34.3 G/DL (ref 32–36)
MCV RBC AUTO: 104 FL (ref 80–100)
MONOCYTES # BLD AUTO: 0.79 X10*3/UL (ref 0.1–1)
MONOCYTES NFR BLD AUTO: 6.9 %
NEUTROPHILS # BLD AUTO: 7.93 X10*3/UL (ref 1.2–7.7)
NEUTROPHILS NFR BLD AUTO: 69.4 %
NRBC BLD-RTO: 0 /100 WBCS (ref 0–0)
PLATELET # BLD AUTO: 223 X10*3/UL (ref 150–450)
POTASSIUM SERPL-SCNC: 3.9 MMOL/L (ref 3.5–5.3)
PROT SERPL-MCNC: 7.5 G/DL (ref 6.4–8.2)
RBC # BLD AUTO: 3.95 X10*6/UL (ref 4.5–5.9)
SODIUM SERPL-SCNC: 138 MMOL/L (ref 136–145)
WBC # BLD AUTO: 11.4 X10*3/UL (ref 4.4–11.3)

## 2024-10-06 PROCEDURE — 83690 ASSAY OF LIPASE: CPT | Performed by: EMERGENCY MEDICINE

## 2024-10-06 PROCEDURE — 96375 TX/PRO/DX INJ NEW DRUG ADDON: CPT

## 2024-10-06 PROCEDURE — 96374 THER/PROPH/DIAG INJ IV PUSH: CPT

## 2024-10-06 PROCEDURE — 36415 COLL VENOUS BLD VENIPUNCTURE: CPT | Performed by: EMERGENCY MEDICINE

## 2024-10-06 PROCEDURE — 76705 ECHO EXAM OF ABDOMEN: CPT

## 2024-10-06 PROCEDURE — 74176 CT ABD & PELVIS W/O CONTRAST: CPT | Performed by: RADIOLOGY

## 2024-10-06 PROCEDURE — 93005 ELECTROCARDIOGRAM TRACING: CPT

## 2024-10-06 PROCEDURE — 80053 COMPREHEN METABOLIC PANEL: CPT | Performed by: EMERGENCY MEDICINE

## 2024-10-06 PROCEDURE — 84484 ASSAY OF TROPONIN QUANT: CPT | Performed by: EMERGENCY MEDICINE

## 2024-10-06 PROCEDURE — 2500000004 HC RX 250 GENERAL PHARMACY W/ HCPCS (ALT 636 FOR OP/ED): Mod: SE | Performed by: EMERGENCY MEDICINE

## 2024-10-06 PROCEDURE — 85025 COMPLETE CBC W/AUTO DIFF WBC: CPT | Performed by: EMERGENCY MEDICINE

## 2024-10-06 PROCEDURE — 99285 EMERGENCY DEPT VISIT HI MDM: CPT | Mod: 25

## 2024-10-06 PROCEDURE — 83735 ASSAY OF MAGNESIUM: CPT | Performed by: EMERGENCY MEDICINE

## 2024-10-06 PROCEDURE — 74176 CT ABD & PELVIS W/O CONTRAST: CPT

## 2024-10-06 PROCEDURE — 93005 ELECTROCARDIOGRAM TRACING: CPT | Mod: 59

## 2024-10-06 PROCEDURE — 84484 ASSAY OF TROPONIN QUANT: CPT | Mod: 91 | Performed by: EMERGENCY MEDICINE

## 2024-10-06 PROCEDURE — 76705 ECHO EXAM OF ABDOMEN: CPT | Performed by: RADIOLOGY

## 2024-10-06 RX ORDER — MORPHINE SULFATE 4 MG/ML
4 INJECTION INTRAVENOUS ONCE
Status: COMPLETED | OUTPATIENT
Start: 2024-10-06 | End: 2024-10-06

## 2024-10-06 RX ORDER — ONDANSETRON HYDROCHLORIDE 2 MG/ML
4 INJECTION, SOLUTION INTRAVENOUS ONCE
Status: COMPLETED | OUTPATIENT
Start: 2024-10-06 | End: 2024-10-06

## 2024-10-06 RX ORDER — PANTOPRAZOLE SODIUM 40 MG/1
40 TABLET, DELAYED RELEASE ORAL DAILY
Qty: 30 TABLET | Refills: 0 | Status: SHIPPED | OUTPATIENT
Start: 2024-10-06 | End: 2024-11-05

## 2024-10-06 RX ORDER — MORPHINE SULFATE 4 MG/ML
4 INJECTION INTRAVENOUS ONCE
Status: DISCONTINUED | OUTPATIENT
Start: 2024-10-06 | End: 2024-10-06 | Stop reason: HOSPADM

## 2024-10-06 RX ORDER — ONDANSETRON 4 MG/1
4 TABLET, ORALLY DISINTEGRATING ORAL EVERY 8 HOURS PRN
Qty: 15 TABLET | Refills: 0 | Status: SHIPPED | OUTPATIENT
Start: 2024-10-06

## 2024-10-06 ASSESSMENT — PAIN SCALES - GENERAL
PAINLEVEL_OUTOF10: 7
PAINLEVEL_OUTOF10: 0 - NO PAIN
PAINLEVEL_OUTOF10: 4
PAINLEVEL_OUTOF10: 0 - NO PAIN
PAINLEVEL_OUTOF10: 0 - NO PAIN

## 2024-10-06 ASSESSMENT — PAIN DESCRIPTION - LOCATION
LOCATION: ABDOMEN
LOCATION: ABDOMEN

## 2024-10-06 ASSESSMENT — PAIN DESCRIPTION - PAIN TYPE: TYPE: ACUTE PAIN

## 2024-10-06 ASSESSMENT — COLUMBIA-SUICIDE SEVERITY RATING SCALE - C-SSRS
6. HAVE YOU EVER DONE ANYTHING, STARTED TO DO ANYTHING, OR PREPARED TO DO ANYTHING TO END YOUR LIFE?: NO
1. IN THE PAST MONTH, HAVE YOU WISHED YOU WERE DEAD OR WISHED YOU COULD GO TO SLEEP AND NOT WAKE UP?: NO
2. HAVE YOU ACTUALLY HAD ANY THOUGHTS OF KILLING YOURSELF?: NO

## 2024-10-06 ASSESSMENT — PAIN DESCRIPTION - ORIENTATION: ORIENTATION: RIGHT

## 2024-10-06 ASSESSMENT — PAIN - FUNCTIONAL ASSESSMENT: PAIN_FUNCTIONAL_ASSESSMENT: 0-10

## 2024-10-06 NOTE — ED NOTES
Patient states he woke with RUQ pain 4/10. The pain moved to epigastric, patient having nausea and bloating     Alonso Day RN  10/06/24 7730

## 2024-10-06 NOTE — ED PROVIDER NOTES
HPI   Chief Complaint   Patient presents with    Abdominal Pain         History provided by:  Medical records and patient      Patient presents emergency department ambulatory via private vehicle for evaluation of right upper quadrant abdominal pain.  Patient says initially it was a discomfort in this area now has intermittent episodes of sharp stabbing pain and radiates up to epigastric.  No radiation to back, flanks, chest.  No fever, chills, coughs, URI symptoms.  No nausea, vomiting, diarrhea.    Patient History   Past Medical History:   Diagnosis Date    Hyperlipemia     Hypertension     Other specified health status     No known problems     Past Surgical History:   Procedure Laterality Date    CORONARY ARTERY BYPASS GRAFT      x 4    OTHER SURGICAL HISTORY  09/06/2016    Leg Incision And Drainage     No family history on file.  Social History     Tobacco Use    Smoking status: Former     Types: Cigarettes    Smokeless tobacco: Never   Vaping Use    Vaping status: Never Used   Substance Use Topics    Alcohol use: Not Currently    Drug use: Yes     Types: Marijuana     Comment: weekly       Physical Exam   ED Triage Vitals [10/06/24 0617]   Temperature Heart Rate Respirations BP   36.4 °C (97.5 °F) 74 18 (!) 163/97      SpO2 Temp src Heart Rate Source Patient Position   99 % -- -- --      BP Location FiO2 (%)     -- --       Physical Exam  Vitals reviewed.   Constitutional:       Appearance: He is obese.   HENT:      Head: Normocephalic and atraumatic.   Eyes:      General: No scleral icterus.     Extraocular Movements: Extraocular movements intact.      Pupils: Pupils are equal, round, and reactive to light.   Cardiovascular:      Rate and Rhythm: Normal rate and regular rhythm.   Pulmonary:      Effort: Pulmonary effort is normal.      Breath sounds: Normal breath sounds.   Abdominal:      General: Abdomen is flat. Bowel sounds are normal.      Palpations: Abdomen is soft.      Tenderness: There is abdominal  tenderness in the right upper quadrant. There is no guarding or rebound. Negative signs include Rangel's sign.   Skin:     General: Skin is warm.      Coloration: Skin is jaundiced.   Neurological:      General: No focal deficit present.      Mental Status: He is alert and oriented to person, place, and time.           ED Course & MDM   ED Course as of 10/06/24 0824   Sun Oct 06, 2024   0651 Patient reassessed, feeling better after meds     [MN]      ED Course User Index  [MN] Guerline Gonzalez MD         Diagnoses as of 10/06/24 0824   Right upper quadrant abdominal pain     Labs Reviewed   CBC WITH AUTO DIFFERENTIAL - Abnormal       Result Value    WBC 11.4 (*)     nRBC 0.0      RBC 3.95 (*)     Hemoglobin 14.1      Hematocrit 41.1       (*)     MCH 35.7 (*)     MCHC 34.3      RDW 14.0      Platelets 223      Neutrophils % 69.4      Immature Granulocytes %, Automated 0.4      Lymphocytes % 21.5      Monocytes % 6.9      Eosinophils % 1.1      Basophils % 0.7      Neutrophils Absolute 7.93 (*)     Immature Granulocytes Absolute, Automated 0.04      Lymphocytes Absolute 2.45      Monocytes Absolute 0.79      Eosinophils Absolute 0.12      Basophils Absolute 0.08     COMPREHENSIVE METABOLIC PANEL - Abnormal    Glucose 135 (*)     Sodium 138      Potassium 3.9      Chloride 103      Bicarbonate 28      Anion Gap 11      Urea Nitrogen 16      Creatinine 1.24      eGFR 65      Calcium 9.0      Albumin 4.1      Alkaline Phosphatase 89      Total Protein 7.5      AST 18      Bilirubin, Total 0.5      ALT 21     MAGNESIUM - Normal    Magnesium 2.03     LIPASE - Normal    Lipase 52      Narrative:     Venipuncture immediately after or during the administration of Metamizole may lead to falsely low results. Testing should be performed immediately prior to Metamizole dosing.   SERIAL TROPONIN-INITIAL - Normal    Troponin I, High Sensitivity 5      Narrative:     Less than 99th percentile of normal range  cutoff-  Female and children under 18 years old <14 ng/L; Male <21 ng/L: Negative  Repeat testing should be performed if clinically indicated.     Female and children under 18 years old 14-50 ng/L; Male 21-50 ng/L:  Consistent with possible cardiac damage and possible increased clinical   risk. Serial measurements may help to assess extent of myocardial damage.     >50 ng/L: Consistent with cardiac damage, increased clinical risk and  myocardial infarction. Serial measurements may help assess extent of   myocardial damage.      NOTE: Children less than 1 year old may have higher baseline troponin   levels and results should be interpreted in conjunction with the overall   clinical context.     NOTE: Troponin I testing is performed using a different   testing methodology at [facility] than at other   [facility]. Direct result comparisons should only   be made within the same method.   TROPONIN SERIES- (INITIAL, 1 HR)    Narrative:     The following orders were created for panel order Troponin I Series, High Sensitivity (0, 1 HR).  Procedure                               Abnormality         Status                     ---------                               -----------         ------                     Troponin I, High Sensiti...[[id number]]  Normal              Final result               Troponin, High Sensitivi...[[id number]]                      In process                   Please view results for these tests on the individual orders.   SERIAL TROPONIN, 1 HOUR   GRAY TOP     ED Medication Administration from 10/06/2024 0607 to 10/06/2024 0648         Date/Time Order Dose Route Action Action by     10/06/2024 0645 EDT morphine injection 4 mg 4 mg intravenous Given Rice, C     10/06/2024 0645 EDT ondansetron (Zofran) injection 4 mg 4 mg intravenous Given Rice, C          CT abdomen pelvis wo IV contrast   Final Result   1. Gallbladder contains layering sludge with minimal pericholecystic    stranding. No gross wall thickening or pericholecystic fluid.   Correlate clinically for early cholecystitis.   2. No acute process otherwise identified within the abdomen or pelvis.        MACRO:   None.        Signed by: James Mason 10/6/2024 7:40 AM   Dictation workstation:   [id number]                    No data recorded     [place] Coma Scale Score: 15 (10/06/24 0619 : Alonso Day RN)                   EKG --   0603--twelve-lead EKG was obtained and read by me. This demonstrates normal sinus rhythm with a rate of 75, l QTc 483, eft axis deviation, diffuse T wave flattening without associate ST segment elevation, but no ectopy, no ischemia, no pericarditis. There was no change compared to most recent prior EKG. 5/9/26 0728 --twelve-lead EKG was obtained and read by me. This demonstrates no normal sinus rhythm with a rate of 78, prolonged QTc, poor R wave progression, but ectopy, no ischemia, no pericarditis. There was no change compared to most recent prior EKG. arrival    Diagnoses as of 10/06/24 0824   Right upper quadrant abdominal pain       Medical Decision Making  Presents emergency department with above history and physical.  No signs of sepsis, dehydration or acute abdomen but he does have some localizing tenderness right upper quadrant epigastrium.  On review of the electronic medical records patient has had previous similar admissions with pancreatitis documented radiographically.    Patient treated with morphine, Zofran, IV fluids with some improvement in pain and no interval development of peritoneal signs.  Laboratory evaluation includes a mild peripheral leukocytosis of 11.4.  Normal lipase.  Troponin of 5.  CT scan abdomen pelvis obtained and read by cardiology demonstrating some pancreatic stranding but improved from previous, gallbladder sludge with possibly some pericholecystic fluid; therefore, right upper quadrant ultrasound obtained to exclude acute cholecystitis.    0800 --  Case signed out to Dr. Ruth at the [facility] to follow-up on test results, response to therapy and make final disposition.    Procedure  Procedures     Guerline Gonzalez MD  10/06/24 0650       Guerline Gonzalez MD  10/06/24 0848

## 2024-10-06 NOTE — DISCHARGE INSTRUCTIONS
Pancreas is ok.  Gallbladder has gallstones.  Pain is due to Biliary colic (gallbladder pain/attack)  Avoid Fried/greasy/spicy foods/fatty foods  SEE General Surgeon of your choice in 3-5 days  IF ACUTELY WORSE OR NEW WORRISOME SYMPTOMS, PLEASE RETURN

## 2024-10-06 NOTE — PROGRESS NOTES
Emergency Medicine Transition of Care Note.    I received Luis Bucio in signout from Dr. THERESA Gonzalez.  Please see the previous ED provider note for all HPI, PE and MDM up to the time of signout at 0800. This is in addition to the primary record.    In brief Luis Bucio is an 64 y.o. male presenting for   Chief Complaint   Patient presents with    Abdominal Pain     At the time of signout we were awaiting: GB Ultrasound and reassessment    EKG --   0603--twelve-lead EKG was obtained and read by me. This demonstrates normal sinus rhythm with a rate of 75, l QTc 483, eft axis deviation, diffuse T wave flattening without associate ST segment elevation, but no ectopy, no ischemia, no pericarditis. There was no change compared to most recent prior EKG. 5/9/26  0728 --twelve-lead EKG was obtained and read by me. This demonstrates no normal sinus rhythm with a rate of 78, prolonged QTc, poor R wave progression, but ectopy, no ischemia, no pericarditis. There was no change compared to most recent prior EKG. arrival     Diagnoses as of 10/06/24 0824   Right upper quadrant abdominal pain         Medical Decision Making  Presents emergency department with above history and physical.  No signs of sepsis, dehydration or acute abdomen but he does have some localizing tenderness right upper quadrant epigastrium.  On review of the electronic medical records patient has had previous similar admissions with pancreatitis documented radiographically.     Patient treated with morphine, Zofran, IV fluids with some improvement in pain and no interval development of peritoneal signs.  Laboratory evaluation includes a mild peripheral leukocytosis of 11.4.  Normal lipase.  Troponin of 5.  CT scan abdomen pelvis obtained and read by cardiology demonstrating some pancreatic stranding but improved from previous, gallbladder sludge with possibly some pericholecystic fluid; therefore, right upper quadrant ultrasound obtained to exclude acute  cholecystitis.     0800 -- Case signed out to Dr. Ruth at the [facility] physician to follow-up on test results, response to therapy and make final disposition.         ED Course as of 10/06/24 1259   Sun Oct 06, 2024   0651 Patient reassessed, feeling better after meds     [MN]   1232 CBC and Auto Differential(!)  White blood cell count 11.4, hemoglobin 14.1, hematocrit 41.1, platelet count 223,000 [EC]   1232 Comprehensive Metabolic Panel(!)  Comprehensive metabolic panel is unremarkable [EC]   1232 Troponin I Series, High Sensitivity (0, 1 HR)  First troponin is normal at 5  Second troponin is normal at 5 [EC]   1232 Lipase  Lipase is normal at 52 [EC]   1232 Magnesium  Is normal at 2.03 [EC]   1233 CT of the abdomen pelvis  IMPRESSION:  1. Gallbladder contains layering sludge with minimal pericholecystic  stranding. No gross wall thickening or pericholecystic fluid.  Correlate clinically for early cholecystitis.  2. No acute process otherwise identified within the abdomen or pelvis.       [EC]   1234 Gallbladder ultrasound  IMPRESSION:  Hepatomegaly.  Nonspecific increased echogenicity throughout the  liver, most likely fatty infiltration.      Obscuration of the pancreas by bowel gas.      Cholelithiasis, currently without specific sonographic evidence of  acute cholecystitis. [EC]   1253 On repeat exam the patient is resting comfortably here.  Is not having any further right upper quadrant abdominal pain at this time.  Gallbladder ultrasound is negative for evidence of acute cholecystitis.  I do believe he had an episode of biliary colic and this has resolved.    The patient be discharged home on pantoprazole 40 mg once a day.  Zofran 4 mg ODT 1 tablet every 6-8 hours as needed for nausea and vomiting.    Patient encouraged to follow-up with general surgery as an outpatient this week.  Patient encouraged to return if acutely worsening worrisome symptoms. [EC]      ED Course User Index  [EC] Elder RAYA  DO Dorian  [MN] Guerline Gonzlaez MD         Diagnoses as of 10/06/24 1259   Right upper quadrant abdominal pain   Calculus of gallbladder without cholecystitis without obstruction   Nausea and vomiting, unspecified vomiting type   Biliary colic       Medical Decision Making      Final diagnoses:   [R10.11] Right upper quadrant abdominal pain   [K80.20] Calculus of gallbladder without cholecystitis without obstruction   [R11.2] Nausea and vomiting, unspecified vomiting type   [K80.50] Biliary colic           Procedure  Procedures    Elder Alarcon DO

## 2024-10-07 LAB
ATRIAL RATE: 75 BPM
ATRIAL RATE: 78 BPM
P AXIS: 55 DEGREES
P AXIS: 55 DEGREES
P OFFSET: 189 MS
P OFFSET: 190 MS
P ONSET: 124 MS
P ONSET: 128 MS
PR INTERVAL: 164 MS
PR INTERVAL: 176 MS
Q ONSET: 210 MS
Q ONSET: 212 MS
QRS COUNT: 12 BEATS
QRS COUNT: 12 BEATS
QRS DURATION: 94 MS
QRS DURATION: 96 MS
QT INTERVAL: 424 MS
QT INTERVAL: 428 MS
QTC CALCULATION(BAZETT): 477 MS
QTC CALCULATION(BAZETT): 483 MS
QTC FREDERICIA: 460 MS
QTC FREDERICIA: 462 MS
R AXIS: -28 DEGREES
R AXIS: -30 DEGREES
T AXIS: 115 DEGREES
T AXIS: 123 DEGREES
T OFFSET: 424 MS
T OFFSET: 424 MS
VENTRICULAR RATE: 75 BPM
VENTRICULAR RATE: 78 BPM

## 2024-10-10 ENCOUNTER — HOSPITAL ENCOUNTER (OUTPATIENT)
Facility: HOSPITAL | Age: 64
Setting detail: OUTPATIENT SURGERY
End: 2024-10-10
Attending: SURGERY | Admitting: SURGERY
Payer: MEDICAID

## 2024-10-10 ENCOUNTER — PRE-ADMISSION TESTING (OUTPATIENT)
Dept: PREADMISSION TESTING | Facility: HOSPITAL | Age: 64
End: 2024-10-10
Payer: MEDICAID

## 2024-10-10 ENCOUNTER — OFFICE VISIT (OUTPATIENT)
Facility: HOSPITAL | Age: 64
End: 2024-10-10
Payer: MEDICAID

## 2024-10-10 VITALS
WEIGHT: 241 LBS | HEART RATE: 85 BPM | OXYGEN SATURATION: 94 % | TEMPERATURE: 97.8 F | HEIGHT: 70 IN | BODY MASS INDEX: 34.5 KG/M2 | RESPIRATION RATE: 18 BRPM | DIASTOLIC BLOOD PRESSURE: 86 MMHG | SYSTOLIC BLOOD PRESSURE: 137 MMHG

## 2024-10-10 VITALS
SYSTOLIC BLOOD PRESSURE: 137 MMHG | RESPIRATION RATE: 18 BRPM | OXYGEN SATURATION: 94 % | HEIGHT: 70 IN | WEIGHT: 237.4 LBS | TEMPERATURE: 97.9 F | HEART RATE: 85 BPM | BODY MASS INDEX: 33.99 KG/M2 | DIASTOLIC BLOOD PRESSURE: 86 MMHG

## 2024-10-10 DIAGNOSIS — K80.20 CALCULUS OF GALLBLADDER WITHOUT CHOLECYSTITIS WITHOUT OBSTRUCTION: ICD-10-CM

## 2024-10-10 DIAGNOSIS — R19.04 LEFT LOWER QUADRANT ABDOMINAL MASS: Primary | ICD-10-CM

## 2024-10-10 PROCEDURE — 3008F BODY MASS INDEX DOCD: CPT | Performed by: SURGERY

## 2024-10-10 PROCEDURE — 3075F SYST BP GE 130 - 139MM HG: CPT | Performed by: SURGERY

## 2024-10-10 PROCEDURE — 3079F DIAST BP 80-89 MM HG: CPT | Performed by: SURGERY

## 2024-10-10 PROCEDURE — 1036F TOBACCO NON-USER: CPT | Performed by: SURGERY

## 2024-10-10 PROCEDURE — 99204 OFFICE O/P NEW MOD 45 MIN: CPT | Performed by: SURGERY

## 2024-10-10 RX ORDER — CHLORHEXIDINE GLUCONATE ORAL RINSE 1.2 MG/ML
15 SOLUTION DENTAL DAILY
Qty: 30 ML | Refills: 0 | Status: SHIPPED | OUTPATIENT
Start: 2024-10-10 | End: 2024-10-12

## 2024-10-10 RX ORDER — CHLORHEXIDINE GLUCONATE 40 MG/ML
SOLUTION TOPICAL DAILY
Qty: 354 ML | Refills: 0 | Status: SHIPPED | OUTPATIENT
Start: 2024-10-10 | End: 2024-10-15

## 2024-10-10 ASSESSMENT — DUKE ACTIVITY SCORE INDEX (DASI)
CAN YOU DO HEAVY WORK AROUND THE HOUSE LIKE SCRUBBING FLOORS OR LIFTING AND MOVING HEAVY FURNITURE: NO
CAN YOU RUN A SHORT DISTANCE: NO
CAN YOU DO LIGHT WORK AROUND THE HOUSE LIKE DUSTING OR WASHING DISHES: YES
DASI METS SCORE: 4.6
CAN YOU HAVE SEXUAL RELATIONS: NO
CAN YOU PARTICIPATE IN MODERATE RECREATIONAL ACTIVITIES LIKE GOLF, BOWLING, DANCING, DOUBLES TENNIS OR THROWING A BASEBALL OR FOOTBALL: NO
CAN YOU WALK A BLOCK OR TWO ON LEVEL GROUND: YES
CAN YOU DO YARD WORK LIKE RAKING LEAVES, WEEDING OR PUSHING A MOWER: NO
CAN YOU PARTICIPATE IN STRENOUS SPORTS LIKE SWIMMING, SINGLES TENNIS, FOOTBALL, BASKETBALL, OR SKIING: NO
CAN YOU CLIMB A FLIGHT OF STAIRS OR WALK UP A HILL: YES
TOTAL_SCORE: 15.45
CAN YOU WALK INDOORS, SUCH AS AROUND YOUR HOUSE: YES
CAN YOU DO MODERATE WORK AROUND THE HOUSE LIKE VACUUMING, SWEEPING FLOORS OR CARRYING GROCERIES: NO
CAN YOU TAKE CARE OF YOURSELF (EAT, DRESS, BATHE, OR USE TOILET): YES

## 2024-10-10 ASSESSMENT — PAIN - FUNCTIONAL ASSESSMENT: PAIN_FUNCTIONAL_ASSESSMENT: 0-10

## 2024-10-10 ASSESSMENT — PAIN SCALES - GENERAL
PAINLEVEL_OUTOF10: 0 - NO PAIN
PAINLEVEL: 0-NO PAIN

## 2024-10-10 NOTE — H&P (VIEW-ONLY)
"General Surgery History and Physical    Referring Provider:  Michaelle Knight,   No ref. provider found     Chief Complaint:  Chief Complaint   Patient presents with    Pain     Patient complains of pain to his right upper quadrant that traveled to his epigastric area that sent him to the ED on 10/6/2024       History of Present Illness:  Luis Bucio \"Hernán\" is a 64 y.o. male  Was in ED on 10/6 for right upper quadrant pain   Pain started with no reason   Nauseated   Goes up to chest as well   Cardiac workup was not remarkable   CT showed Gallbladder contains layering sludge with minimal pericholecystic  stranding. No gross wall thickening or pericholecystic fluid.  US showed: Hepatomegaly.  Nonspecific increased echogenicity throughout the  liver, most likely fatty infiltration.Cholelithiasis, currently without specific sonographic evidence of  acute cholecystitis.  Came today for follow up       Past Medical History:  Past Medical History:   Diagnosis Date    GERD (gastroesophageal reflux disease)     Hyperlipemia     Hypertension     Other specified health status     No known problems        Past Surgical History:  Past Surgical History:   Procedure Laterality Date    CORONARY ARTERY BYPASS GRAFT      x 4    OTHER SURGICAL HISTORY  09/06/2016    Leg Incision And Drainage        Medications:  Current Outpatient Medications   Medication Instructions    amLODIPine (NORVASC) 5 mg, oral, Daily (0630)    aspirin 81 mg, oral, Daily    atorvastatin (LIPITOR) 40 mg, oral, Daily    chlorhexidine (Hibiclens) 4 % external liquid Topical, Daily    chlorhexidine (Peridex) 0.12 % solution 15 mL, Mouth/Throat, Daily, Once the night before surgery and once the morning of    lipase-protease-amylase (Creon) 24,000-76,000 -120,000 unit capsule Take 2 capsules by mouth 3 times a day with meals. May also take 1 capsule with snacks for snacks.    metoprolol succinate XL (TOPROL-XL) 25 mg, oral, Daily, Do not crush, split or chew    " nitroglycerin (NITROSTAT) 0.4 mg, sublingual, Every 5 min PRN    ondansetron ODT (ZOFRAN-ODT) 4 mg, oral, Every 8 hours PRN    pantoprazole (PROTONIX) 40 mg, oral, Daily, Do not crush, chew, or split.        Allergies:  No Known Allergies     Family History:  No family history on file.     Social History:  Social History     Socioeconomic History    Marital status:    Tobacco Use    Smoking status: Former     Types: Cigarettes    Smokeless tobacco: Never    Tobacco comments:     6/29/1996 quit smoking   Vaping Use    Vaping status: Never Used   Substance and Sexual Activity    Alcohol use: Not Currently    Drug use: Yes     Types: Marijuana     Comment: weekly     Social Determinants of Health     Financial Resource Strain: Low Risk  (2/7/2024)    Overall Financial Resource Strain (CARDIA)     Difficulty of Paying Living Expenses: Not hard at all   Transportation Needs: No Transportation Needs (2/7/2024)    PRAPARE - Transportation     Lack of Transportation (Medical): No     Lack of Transportation (Non-Medical): No   Recent Concern: Transportation Needs - Unmet Transportation Needs (12/22/2023)    PRAPARE - Transportation     Lack of Transportation (Medical): Yes     Lack of Transportation (Non-Medical): Yes   Housing Stability: Low Risk  (2/7/2024)    Housing Stability Vital Sign     Unable to Pay for Housing in the Last Year: No     Number of Places Lived in the Last Year: 1     Unstable Housing in the Last Year: No        Review of Systems:  A complete 12 point review of systems was performed. Please see scanned questionnaire and is otherwise negative except as noted in the history of present illness.    Vital Signs:  Vitals:    10/10/24 1303   BP: 137/86   Pulse: 85   Resp: 18   Temp: 36.6 °C (97.8 °F)   SpO2: 94%      Body mass index is 34.58 kg/m².      Physical Exam:  General: No acute distress.   Neuro: Alert and oriented ×3. Follows commands.  Face: Atraumatic, no visible skin lesion.   Head:  Atraumatic  Eyes: Pupils equal reactive to light. Extraocular motions intact.  Ears: Hears normal speaking voice.  Mouth, Nose, Throat: Mucous membranes moist.  Normal dentition.  Neck: Supple. No visible masses.  Breast: Not examined.   Chest: midline scar from previous CABG  Lung: Patent airway and no labored breath.   Vascular: Palpable radial pulses bilaterally.  Abdomen: Soft, NT,ND.  Obese.   Rectal and Perianal: Not examined.   Genitourinary: Not examined.   Musculoskeletal: Moves all extremities.  Normal range of motion.  Extremities: No cyanosis. No edema.   Lymphatic: No palpable lymph nodes.  Skin: No rashes or lesions.  Psychological: Normal affect      Laboratory Values:  CBC:   Lab Results   Component Value Date    WBC 11.4 (H) 10/06/2024    RBC 3.95 (L) 10/06/2024    HGB 14.1 10/06/2024    HCT 41.1 10/06/2024     10/06/2024       RFP:   Lab Results   Component Value Date     10/06/2024    K 3.9 10/06/2024     10/06/2024    CO2 28 10/06/2024    BUN 16 10/06/2024    CREATININE 1.24 10/06/2024    CALCIUM 9.0 10/06/2024    MG 2.03 10/06/2024    PHOS 1.9 (L) 02/09/2024        LFTs:   Lab Results   Component Value Date    PROT 7.5 10/06/2024    ALBUMIN 4.1 10/06/2024    BILITOT 0.5 10/06/2024    BILIDIR 0.4 (H) 02/09/2024    ALKPHOS 89 10/06/2024    AST 18 10/06/2024    ALT 21 10/06/2024            Imaging:  I have personally reviewed the images and the radiologist's report.  ECG 12 lead    Result Date: 10/7/2024  Normal sinus rhythm Nonspecific ST and T wave abnormality Prolonged QT Abnormal ECG When compared with ECG of 06-OCT-2024 06:03, (unconfirmed) No significant change was found See ED provider note for full interpretation and clinical correlation Confirmed by Luisa Sewell ([id number]) on 10/7/2024 10:20:21 AM    ECG 12 lead    Result Date: 10/7/2024  Normal sinus rhythm Left axis deviation Nonspecific ST and T wave abnormality Abnormal ECG When compared with ECG of 09-MAY-2024  12:16, No significant change was found See ED provider note for full interpretation and clinical correlation Confirmed by Luisa Sewell ([id number]) on 10/7/2024 10:19:58 AM    US gallbladder    Result Date: 10/6/2024  Interpreted By:  Erasmo Goodwin, STUDY: US GALLBLADDER  10/6/2024 8:52 am   INDICATION: 65 y/o   M with  Signs/Symptoms:RUq pain, abnormal CT gallbladder, ?choleycystitis.   COMPARISON: None.   ACCESSION NUMBER(S): [id number]   ORDERING CLINICIAN: ANNETTA WINTERS   TECHNIQUE: Routine ultrasound of the right upper quadrant was performed using grayscale imaging, color Doppler, and spectral Doppler.   FINDINGS: LIVER: Craniocaudal length: 19.2 cm.  This is  within normal limits. Echogenicity:  Diffusely increased. Mass:  None   GALLBLADDER: Multiple small layering stones along the floor of the gallbladder. No wall thickening, or adjacent edema  . The gallbladder wall thickness is 0.2 cm. Sonographic Rangel's sign is negative.   BILE DUCTS: No intrahepatic biliary ductal dilatation. Common bile duct measured 0.7 cm   in diameter. This is within the limits of normal.   PANCREAS: The pancreas was obscured by bowel gas..   RIGHT KIDNEY: Craniocaudal length:  9.0 cm , Within normal limits of size for age. Echogenicity was normal. No hydronephrosis, shadowing stone, or perinephric edema. No gross right renal mass.   PERITONEAL FLUID: None seen in the right upper quadrant.         Hepatomegaly.  Nonspecific increased echogenicity throughout the liver, most likely fatty infiltration.   Obscuration of the pancreas by bowel gas.   Cholelithiasis, currently without specific sonographic evidence of acute cholecystitis.   MACRO: None   Signed by: Erasmo Goodwin 10/6/2024 9:02 AM Dictation workstation:   [id number]    CT abdomen pelvis wo IV contrast    Result Date: 10/6/2024  Interpreted By:  James Mason, STUDY: CT ABDOMEN PELVIS WO IV CONTRAST;  10/6/2024 7:14 am   INDICATION: Signs/Symptoms:ABD pain nausea, h/o  pancreatitis.   COMPARISON: 02/09/2024   ACCESSION NUMBER(S): [id number]   ORDERING CLINICIAN: ANNETTA WINTERS   TECHNIQUE: CT of the abdomen and pelvis was performed. No oral, no intravenous contrast.   FINDINGS: LOWER CHEST: Images of the lung bases show no infiltrate or pleural fluid.   ABDOMEN:   LIVER: There is no hepatic mass.   BILE DUCTS: There is no intrahepatic, common hepatic or common bile ductal dilatation.   GALLBLADDER: Layering sludge. Trace pericholecystic stranding. No gross wall thickening or pericholecystic fluid appreciated.   PANCREAS: Near complete resolution of the previously seen inflammatory changes about the pancreas. No pancreatic stones or mass lesions appreciated.   SPLEEN: The spleen is unremarkable. There is no splenic mass or splenomegaly.   ADRENAL GLANDS: The adrenal glands are unremarkable.   KIDNEYS, URETERS and BLADDER: The kidneys demonstrate no mass.  There is no intrarenal calculus or hydronephrosis.   BOWEL: Small duodenal diverticulum. There is no bowel wall thickening, dilatation or obstruction. Appendix unremarkable. Fecalization of the terminal ileum in the absence of obstruction or inflammatory changes.   VESSELS: No aneurysm. IVC within normal limits.   PERITONEUM/RETROPERITONEUM/LYMPH NODES: There is no retroperitoneal or pelvic adenopathy.  There is no ascites.   REPRODUCTIVE ORGANS: Unremarkable.   ABDOMINAL WALL: The abdominal wall is unremarkable.   BONE AND SOFT TISSUE: There is no acute osseous finding.   There is no soft tissue abnormality.       1. Gallbladder contains layering sludge with minimal pericholecystic stranding. No gross wall thickening or pericholecystic fluid. Correlate clinically for early cholecystitis. 2. No acute process otherwise identified within the abdomen or pelvis.   MACRO: None.   Signed by: James Mason 10/6/2024 7:40 AM Dictation workstation:   [id number]        Assessment:  This is a 64 y.o. male who presents with follow  conditions:   Recent ED visit for RUQ pain   CT and US: gallbladder sludge, hepatomegaly likely fatty liver       Plan:  I discussed with the patient and explained to the patient that his symptoms are most likely caused by gallstones/sludge. I would recommend gallbladder removal. I explained to the patient about the risks and benefits of gallbladder surgery (laparoscopic cholecystectomy). Patient expressed understanding that the risks of surgery include but are not limited to bleeding, infection, possible intra-abdominal viscus injury and bile duct injury which may require more extensive surgery, possibility for open surgery, risks of anesthesia, mortality in rare/extreme situations. Patient agreed to proceed. I also recommended liver wedge biopsy during surgery for abnormal findings of liver on imaging.     He will need PAT due to his cardiac history. If he can be cleared by PAT, surgery will be tentatively scheduled for 10/17.       Toñito Pichardo MD [facility]  General Surgery  Office: [phone]  Fax:     [phone]  1:45 PM   10/10/24

## 2024-10-10 NOTE — PROGRESS NOTES
"General Surgery History and Physical    Referring Provider:  Michaelle Knight,   No ref. provider found     Chief Complaint:  Chief Complaint   Patient presents with    Pain     Patient complains of pain to his right upper quadrant that traveled to his epigastric area that sent him to the ED on 10/6/2024       History of Present Illness:  Luis Bucio \"Hernán\" is a 64 y.o. male  Was in ED on 10/6 for right upper quadrant pain   Pain started with no reason   Nauseated   Goes up to chest as well   Cardiac workup was not remarkable   CT showed Gallbladder contains layering sludge with minimal pericholecystic  stranding. No gross wall thickening or pericholecystic fluid.  US showed: Hepatomegaly.  Nonspecific increased echogenicity throughout the  liver, most likely fatty infiltration.Cholelithiasis, currently without specific sonographic evidence of  acute cholecystitis.  Came today for follow up       Past Medical History:  Past Medical History:   Diagnosis Date    GERD (gastroesophageal reflux disease)     Hyperlipemia     Hypertension     Other specified health status     No known problems        Past Surgical History:  Past Surgical History:   Procedure Laterality Date    CORONARY ARTERY BYPASS GRAFT      x 4    OTHER SURGICAL HISTORY  09/06/2016    Leg Incision And Drainage        Medications:  Current Outpatient Medications   Medication Instructions    amLODIPine (NORVASC) 5 mg, oral, Daily (0630)    aspirin 81 mg, oral, Daily    atorvastatin (LIPITOR) 40 mg, oral, Daily    chlorhexidine (Hibiclens) 4 % external liquid Topical, Daily    chlorhexidine (Peridex) 0.12 % solution 15 mL, Mouth/Throat, Daily, Once the night before surgery and once the morning of    lipase-protease-amylase (Creon) 24,000-76,000 -120,000 unit capsule Take 2 capsules by mouth 3 times a day with meals. May also take 1 capsule with snacks for snacks.    metoprolol succinate XL (TOPROL-XL) 25 mg, oral, Daily, Do not crush, split or chew    " nitroglycerin (NITROSTAT) 0.4 mg, sublingual, Every 5 min PRN    ondansetron ODT (ZOFRAN-ODT) 4 mg, oral, Every 8 hours PRN    pantoprazole (PROTONIX) 40 mg, oral, Daily, Do not crush, chew, or split.        Allergies:  No Known Allergies     Family History:  No family history on file.     Social History:  Social History     Socioeconomic History    Marital status:    Tobacco Use    Smoking status: Former     Types: Cigarettes    Smokeless tobacco: Never    Tobacco comments:     6/29/1996 quit smoking   Vaping Use    Vaping status: Never Used   Substance and Sexual Activity    Alcohol use: Not Currently    Drug use: Yes     Types: Marijuana     Comment: weekly     Social Determinants of Health     Financial Resource Strain: Low Risk  (2/7/2024)    Overall Financial Resource Strain (CARDIA)     Difficulty of Paying Living Expenses: Not hard at all   Transportation Needs: No Transportation Needs (2/7/2024)    PRAPARE - Transportation     Lack of Transportation (Medical): No     Lack of Transportation (Non-Medical): No   Recent Concern: Transportation Needs - Unmet Transportation Needs (12/22/2023)    PRAPARE - Transportation     Lack of Transportation (Medical): Yes     Lack of Transportation (Non-Medical): Yes   Housing Stability: Low Risk  (2/7/2024)    Housing Stability Vital Sign     Unable to Pay for Housing in the Last Year: No     Number of Places Lived in the Last Year: 1     Unstable Housing in the Last Year: No        Review of Systems:  A complete 12 point review of systems was performed. Please see scanned questionnaire and is otherwise negative except as noted in the history of present illness.    Vital Signs:  Vitals:    10/10/24 1303   BP: 137/86   Pulse: 85   Resp: 18   Temp: 36.6 °C (97.8 °F)   SpO2: 94%      Body mass index is 34.58 kg/m².      Physical Exam:  General: No acute distress.   Neuro: Alert and oriented ×3. Follows commands.  Face: Atraumatic, no visible skin lesion.   Head:  Atraumatic  Eyes: Pupils equal reactive to light. Extraocular motions intact.  Ears: Hears normal speaking voice.  Mouth, Nose, Throat: Mucous membranes moist.  Normal dentition.  Neck: Supple. No visible masses.  Breast: Not examined.   Chest: midline scar from previous CABG  Lung: Patent airway and no labored breath.   Vascular: Palpable radial pulses bilaterally.  Abdomen: Soft, NT,ND.  Obese.   Rectal and Perianal: Not examined.   Genitourinary: Not examined.   Musculoskeletal: Moves all extremities.  Normal range of motion.  Extremities: No cyanosis. No edema.   Lymphatic: No palpable lymph nodes.  Skin: No rashes or lesions.  Psychological: Normal affect      Laboratory Values:  CBC:   Lab Results   Component Value Date    WBC 11.4 (H) 10/06/2024    RBC 3.95 (L) 10/06/2024    HGB 14.1 10/06/2024    HCT 41.1 10/06/2024     10/06/2024       RFP:   Lab Results   Component Value Date     10/06/2024    K 3.9 10/06/2024     10/06/2024    CO2 28 10/06/2024    BUN 16 10/06/2024    CREATININE 1.24 10/06/2024    CALCIUM 9.0 10/06/2024    MG 2.03 10/06/2024    PHOS 1.9 (L) 02/09/2024        LFTs:   Lab Results   Component Value Date    PROT 7.5 10/06/2024    ALBUMIN 4.1 10/06/2024    BILITOT 0.5 10/06/2024    BILIDIR 0.4 (H) 02/09/2024    ALKPHOS 89 10/06/2024    AST 18 10/06/2024    ALT 21 10/06/2024            Imaging:  I have personally reviewed the images and the radiologist's report.  ECG 12 lead    Result Date: 10/7/2024  Normal sinus rhythm Nonspecific ST and T wave abnormality Prolonged QT Abnormal ECG When compared with ECG of 06-OCT-2024 06:03, (unconfirmed) No significant change was found See ED provider note for full interpretation and clinical correlation Confirmed by Luisa Sewell ([id number]) on 10/7/2024 10:20:21 AM    ECG 12 lead    Result Date: 10/7/2024  Normal sinus rhythm Left axis deviation Nonspecific ST and T wave abnormality Abnormal ECG When compared with ECG of 09-MAY-2024  12:16, No significant change was found See ED provider note for full interpretation and clinical correlation Confirmed by Luisa Sewell ([id number]) on 10/7/2024 10:19:58 AM    US gallbladder    Result Date: 10/6/2024  Interpreted By:  Erasmo Goodwin, STUDY: US GALLBLADDER  10/6/2024 8:52 am   INDICATION: 63 y/o   M with  Signs/Symptoms:RUq pain, abnormal CT gallbladder, ?choleycystitis.   COMPARISON: None.   ACCESSION NUMBER(S): [id number]   ORDERING CLINICIAN: ANNETTA WINTERS   TECHNIQUE: Routine ultrasound of the right upper quadrant was performed using grayscale imaging, color Doppler, and spectral Doppler.   FINDINGS: LIVER: Craniocaudal length: 19.2 cm.  This is  within normal limits. Echogenicity:  Diffusely increased. Mass:  None   GALLBLADDER: Multiple small layering stones along the floor of the gallbladder. No wall thickening, or adjacent edema  . The gallbladder wall thickness is 0.2 cm. Sonographic Rangel's sign is negative.   BILE DUCTS: No intrahepatic biliary ductal dilatation. Common bile duct measured 0.7 cm   in diameter. This is within the limits of normal.   PANCREAS: The pancreas was obscured by bowel gas..   RIGHT KIDNEY: Craniocaudal length:  9.0 cm , Within normal limits of size for age. Echogenicity was normal. No hydronephrosis, shadowing stone, or perinephric edema. No gross right renal mass.   PERITONEAL FLUID: None seen in the right upper quadrant.         Hepatomegaly.  Nonspecific increased echogenicity throughout the liver, most likely fatty infiltration.   Obscuration of the pancreas by bowel gas.   Cholelithiasis, currently without specific sonographic evidence of acute cholecystitis.   MACRO: None   Signed by: Erasmo Goodwin 10/6/2024 9:02 AM Dictation workstation:   [id number]    CT abdomen pelvis wo IV contrast    Result Date: 10/6/2024  Interpreted By:  James Mason, STUDY: CT ABDOMEN PELVIS WO IV CONTRAST;  10/6/2024 7:14 am   INDICATION: Signs/Symptoms:ABD pain nausea, h/o  pancreatitis.   COMPARISON: 02/09/2024   ACCESSION NUMBER(S): [id number]   ORDERING CLINICIAN: ANNETTA WINTERS   TECHNIQUE: CT of the abdomen and pelvis was performed. No oral, no intravenous contrast.   FINDINGS: LOWER CHEST: Images of the lung bases show no infiltrate or pleural fluid.   ABDOMEN:   LIVER: There is no hepatic mass.   BILE DUCTS: There is no intrahepatic, common hepatic or common bile ductal dilatation.   GALLBLADDER: Layering sludge. Trace pericholecystic stranding. No gross wall thickening or pericholecystic fluid appreciated.   PANCREAS: Near complete resolution of the previously seen inflammatory changes about the pancreas. No pancreatic stones or mass lesions appreciated.   SPLEEN: The spleen is unremarkable. There is no splenic mass or splenomegaly.   ADRENAL GLANDS: The adrenal glands are unremarkable.   KIDNEYS, URETERS and BLADDER: The kidneys demonstrate no mass.  There is no intrarenal calculus or hydronephrosis.   BOWEL: Small duodenal diverticulum. There is no bowel wall thickening, dilatation or obstruction. Appendix unremarkable. Fecalization of the terminal ileum in the absence of obstruction or inflammatory changes.   VESSELS: No aneurysm. IVC within normal limits.   PERITONEUM/RETROPERITONEUM/LYMPH NODES: There is no retroperitoneal or pelvic adenopathy.  There is no ascites.   REPRODUCTIVE ORGANS: Unremarkable.   ABDOMINAL WALL: The abdominal wall is unremarkable.   BONE AND SOFT TISSUE: There is no acute osseous finding.   There is no soft tissue abnormality.       1. Gallbladder contains layering sludge with minimal pericholecystic stranding. No gross wall thickening or pericholecystic fluid. Correlate clinically for early cholecystitis. 2. No acute process otherwise identified within the abdomen or pelvis.   MACRO: None.   Signed by: James Mason 10/6/2024 7:40 AM Dictation workstation:   [id number]        Assessment:  This is a 64 y.o. male who presents with follow  conditions:   Recent ED visit for RUQ pain   CT and US: gallbladder sludge, hepatomegaly likely fatty liver       Plan:  I discussed with the patient and explained to the patient that his symptoms are most likely caused by gallstones/sludge. I would recommend gallbladder removal. I explained to the patient about the risks and benefits of gallbladder surgery (laparoscopic cholecystectomy). Patient expressed understanding that the risks of surgery include but are not limited to bleeding, infection, possible intra-abdominal viscus injury and bile duct injury which may require more extensive surgery, possibility for open surgery, risks of anesthesia, mortality in rare/extreme situations. Patient agreed to proceed. I also recommended liver wedge biopsy during surgery for abnormal findings of liver on imaging.     He will need PAT due to his cardiac history. If he can be cleared by PAT, surgery will be tentatively scheduled for 10/17.       Toñito Pichardo MD [facility]  General Surgery  Office: [phone]  Fax:     [phone]  1:45 PM   10/10/24

## 2024-10-10 NOTE — PREPROCEDURE INSTRUCTIONS
Medication List            Accurate as of October 10, 2024  1:52 PM. Always use your most recent med list.                amLODIPine 5 mg tablet  Commonly known as: Norvasc  Take 1 tablet (5 mg) by mouth early in the morning..  Medication Adjustments for Surgery: Do Not take on the morning of surgery     aspirin 81 mg EC tablet  Take 1 tablet (81 mg) by mouth once daily.  Additional Medication Adjustments for Surgery: Take last dose 7 days before surgery     atorvastatin 40 mg tablet  Commonly known as: Lipitor  Take 1 tablet (40 mg) by mouth once daily.  Medication Adjustments for Surgery: Do Not take on the morning of surgery     * chlorhexidine 4 % external liquid  Commonly known as: Hibiclens  Apply topically once daily for 5 days.  Medication Adjustments for Surgery: Take/Use as prescribed     * chlorhexidine 0.12 % solution  Commonly known as: Peridex  Use 15 mL in the mouth or throat once daily for 2 days. Once the night before surgery and once the morning of  Medication Adjustments for Surgery: Take/Use as prescribed     Creon 24,000-76,000 -120,000 unit capsule  Generic drug: lipase-protease-amylase  Take 2 capsules by mouth 3 times a day with meals. May also take 1 capsule with snacks for snacks.  Medication Adjustments for Surgery: Do Not take on the morning of surgery     metoprolol succinate XL 25 mg 24 hr tablet  Commonly known as: Toprol-XL  Take 1 tablet (25 mg) by mouth once daily. Do not crush, split or chew  Medication Adjustments for Surgery: Take on the morning of surgery     nitroglycerin 0.4 mg SL tablet  Commonly known as: Nitrostat  Place 1 tablet (0.4 mg) under the tongue every 5 minutes if needed for chest pain.  Medication Adjustments for Surgery: Take/Use as prescribed     ondansetron ODT 4 mg disintegrating tablet  Commonly known as: Zofran-ODT  Take 1 tablet (4 mg) by mouth every 8 hours if needed for nausea or vomiting.  Medication Adjustments for Surgery: Do Not take on the  morning of surgery     pantoprazole 40 mg EC tablet  Commonly known as: ProtoNix  Take 1 tablet (40 mg) by mouth once daily. Do not crush, chew, or split.  Medication Adjustments for Surgery: Do Not take on the morning of surgery           * This list has 2 medication(s) that are the same as other medications prescribed for you. Read the directions carefully, and ask your doctor or other care provider to review them with you.                Follow all pre procedure instructions.  No solid foods after midnight on 10/16/2024.  You may continue to have clear liquids up to 4 hours prior to your arrival for your procedure on  10/17/2024. Use surgical scrub and mouthwash as scheduled.  Do not smoke marijuana 24 hours prior to your procedure.      You will need cardiac clearance.  Please reach out to your cardiologist for an appointment.        [facility] will notify you the day prior to your procedure for your scheduled arrival time.  Please ensure that you have a responsible adult for transportation on the day of your procedure, as you will not be eligible for your procedure without appropriate transportation. Please follow all pre-operative instructions and call [facility] at [phone] should any questions arise.  Please also notify the Outpatient Surgery Department if there are any changes in your health between now and your scheduled procedure.  Thank you and we look forward to caring for you.

## 2024-10-11 ENCOUNTER — TELEPHONE (OUTPATIENT)
Dept: CARDIOLOGY | Facility: CLINIC | Age: 64
End: 2024-10-11
Payer: MEDICAID

## 2024-10-11 RX ORDER — ENOXAPARIN SODIUM 300 MG/3ML
30 INJECTION INTRAVENOUS; SUBCUTANEOUS ONCE
OUTPATIENT
Start: 2024-10-11

## 2024-10-11 RX ORDER — CEFAZOLIN SODIUM 2 G/100ML
2 INJECTION, SOLUTION INTRAVENOUS ONCE
OUTPATIENT
Start: 2024-10-11

## 2024-10-11 RX ORDER — INDOCYANINE GREEN AND WATER 25 MG
2.5 KIT INJECTION ONCE
OUTPATIENT
Start: 2024-10-11 | End: 2024-10-11

## 2024-10-16 ENCOUNTER — TELEPHONE (OUTPATIENT)
Dept: PREADMISSION TESTING | Facility: HOSPITAL | Age: 64
End: 2024-10-16
Payer: MEDICAID

## 2024-10-16 ENCOUNTER — ANESTHESIA EVENT (OUTPATIENT)
Dept: OPERATING ROOM | Facility: HOSPITAL | Age: 64
End: 2024-10-16
Payer: MEDICAID

## 2024-10-16 NOTE — TELEPHONE ENCOUNTER
SURGERY PRE-OPERATIVE INSTRUCTIONS    *You will receive a phone call the day before your procedure  after 2pm, (or the Friday before your surgery if scheduled on a Monday.) Generally the hospital will be calling you with this information after that time.    *You are not to eat after midnight the night before the surgery. You may have 8oz of a clear liquid up until 2 hours prior to arriving to the hospital. The exception is with medications you were instructed to take day of surgery.    *You may take tylenol for pain/discomfort as needed.     *Stop taking all aspirin products, ibuprofen (motrin/advil), naproxen (aleve/naprosyn) for one week prior to surgery.    *Stop taking all vitamins and supplements one week prior to surgery.     *You should not have alcoholic beverages for 24 hours before surgery.     *You should not smoke 24 hours prior to surgery.     *To help prevent surgical infections bathe/shower with Dial soap the evening before surgery.    *You can wear deodorant but no lotion, powder, or perfume/cologne. You should remove all make-up and nail polish at home.    *If you wear glasses, please bring a case for the glasses with you.    *You will be asked to remove dentures and contacts.     *Please leave all valuables at home.    *You should wear loose, comfortable clothing that will accommodate bandages and/or casts.    *You should notify your doctor of any change in your condition (fever, cold, rash, etc). Surgery may need to be re-scheduled until a time you are in better health.    *A responsible adult is required to accompany you to and from the hospital if you are receiving anesthesia or a sedative. Patients are not permitted to drive for 24 hours after anesthesia.     *You can use the [facility] parking if you wish.     *If you have any further questions please call [phone]. Reviewed pts. History and cardiac notes with JENNA Car to proceed.  Pt . To take metoprolol, amlodipine and  atorvastatin am of procedure.  LT   Yes None known

## 2024-10-18 ENCOUNTER — ANESTHESIA (OUTPATIENT)
Dept: OPERATING ROOM | Facility: HOSPITAL | Age: 64
End: 2024-10-18
Payer: MEDICAID

## 2024-10-18 ENCOUNTER — HOSPITAL ENCOUNTER (OUTPATIENT)
Facility: HOSPITAL | Age: 64
Discharge: HOME | End: 2024-10-20
Attending: SURGERY | Admitting: SURGERY
Payer: MEDICAID

## 2024-10-18 DIAGNOSIS — Z90.49 S/P LAPAROSCOPIC CHOLECYSTECTOMY: Primary | ICD-10-CM

## 2024-10-18 DIAGNOSIS — K80.20 CALCULUS OF GALLBLADDER WITHOUT CHOLECYSTITIS WITHOUT OBSTRUCTION: ICD-10-CM

## 2024-10-18 PROCEDURE — 88307 TISSUE EXAM BY PATHOLOGIST: CPT | Mod: TC,GEALAB | Performed by: SURGERY

## 2024-10-18 PROCEDURE — 7100000011 HC EXTENDED STAY RECOVERY HOURLY - NURSING UNIT

## 2024-10-18 PROCEDURE — 88307 TISSUE EXAM BY PATHOLOGIST: CPT | Performed by: STUDENT IN AN ORGANIZED HEALTH CARE EDUCATION/TRAINING PROGRAM

## 2024-10-18 PROCEDURE — 96372 THER/PROPH/DIAG INJ SC/IM: CPT | Performed by: SURGERY

## 2024-10-18 PROCEDURE — 2500000004 HC RX 250 GENERAL PHARMACY W/ HCPCS (ALT 636 FOR OP/ED): Performed by: ANESTHESIOLOGY

## 2024-10-18 PROCEDURE — 3700000001 HC GENERAL ANESTHESIA TIME - INITIAL BASE CHARGE: Performed by: SURGERY

## 2024-10-18 PROCEDURE — 7100000001 HC RECOVERY ROOM TIME - INITIAL BASE CHARGE: Performed by: SURGERY

## 2024-10-18 PROCEDURE — 2500000005 HC RX 250 GENERAL PHARMACY W/O HCPCS: Performed by: SURGERY

## 2024-10-18 PROCEDURE — 3700000002 HC GENERAL ANESTHESIA TIME - EACH INCREMENTAL 1 MINUTE: Performed by: SURGERY

## 2024-10-18 PROCEDURE — 2720000007 HC OR 272 NO HCPCS: Performed by: SURGERY

## 2024-10-18 PROCEDURE — 2500000001 HC RX 250 WO HCPCS SELF ADMINISTERED DRUGS (ALT 637 FOR MEDICARE OP): Performed by: SURGERY

## 2024-10-18 PROCEDURE — 9420000001 HC RT PATIENT EDUCATION 5 MIN

## 2024-10-18 PROCEDURE — 7100000002 HC RECOVERY ROOM TIME - EACH INCREMENTAL 1 MINUTE: Performed by: SURGERY

## 2024-10-18 PROCEDURE — 47562 LAPAROSCOPIC CHOLECYSTECTOMY: CPT | Performed by: SURGERY

## 2024-10-18 PROCEDURE — 94760 N-INVAS EAR/PLS OXIMETRY 1: CPT

## 2024-10-18 PROCEDURE — C1889 IMPLANT/INSERT DEVICE, NOC: HCPCS | Performed by: SURGERY

## 2024-10-18 PROCEDURE — 3600000003 HC OR TIME - INITIAL BASE CHARGE - PROCEDURE LEVEL THREE: Performed by: SURGERY

## 2024-10-18 PROCEDURE — 3600000008 HC OR TIME - EACH INCREMENTAL 1 MINUTE - PROCEDURE LEVEL THREE: Performed by: SURGERY

## 2024-10-18 PROCEDURE — 88313 SPECIAL STAINS GROUP 2: CPT | Performed by: STUDENT IN AN ORGANIZED HEALTH CARE EDUCATION/TRAINING PROGRAM

## 2024-10-18 PROCEDURE — 88304 TISSUE EXAM BY PATHOLOGIST: CPT | Performed by: STUDENT IN AN ORGANIZED HEALTH CARE EDUCATION/TRAINING PROGRAM

## 2024-10-18 PROCEDURE — 2500000004 HC RX 250 GENERAL PHARMACY W/ HCPCS (ALT 636 FOR OP/ED): Performed by: REGISTERED NURSE

## 2024-10-18 PROCEDURE — 47100 WEDGE BIOPSY OF LIVER: CPT | Performed by: SURGERY

## 2024-10-18 PROCEDURE — 2500000004 HC RX 250 GENERAL PHARMACY W/ HCPCS (ALT 636 FOR OP/ED): Performed by: SURGERY

## 2024-10-18 PROCEDURE — 2780000003 HC OR 278 NO HCPCS: Performed by: SURGERY

## 2024-10-18 RX ORDER — HYDROCODONE BITARTRATE AND ACETAMINOPHEN 5; 325 MG/1; MG/1
1 TABLET ORAL EVERY 4 HOURS PRN
Status: DISCONTINUED | OUTPATIENT
Start: 2024-10-18 | End: 2024-10-20 | Stop reason: HOSPADM

## 2024-10-18 RX ORDER — ROCURONIUM BROMIDE 10 MG/ML
INJECTION, SOLUTION INTRAVENOUS AS NEEDED
Status: DISCONTINUED | OUTPATIENT
Start: 2024-10-18 | End: 2024-10-18

## 2024-10-18 RX ORDER — PHENYLEPHRINE HCL IN 0.9% NACL 0.4MG/10ML
SYRINGE (ML) INTRAVENOUS AS NEEDED
Status: DISCONTINUED | OUTPATIENT
Start: 2024-10-18 | End: 2024-10-18

## 2024-10-18 RX ORDER — AMOXICILLIN 250 MG
2 CAPSULE ORAL 2 TIMES DAILY
Status: DISCONTINUED | OUTPATIENT
Start: 2024-10-18 | End: 2024-10-20 | Stop reason: HOSPADM

## 2024-10-18 RX ORDER — BUPIVACAINE HYDROCHLORIDE 5 MG/ML
INJECTION, SOLUTION EPIDURAL; INTRACAUDAL AS NEEDED
Status: DISCONTINUED | OUTPATIENT
Start: 2024-10-18 | End: 2024-10-18 | Stop reason: HOSPADM

## 2024-10-18 RX ORDER — LIDOCAINE HCL/PF 100 MG/5ML
SYRINGE (ML) INTRAVENOUS AS NEEDED
Status: DISCONTINUED | OUTPATIENT
Start: 2024-10-18 | End: 2024-10-18

## 2024-10-18 RX ORDER — CYCLOBENZAPRINE HCL 5 MG
5 TABLET ORAL 3 TIMES DAILY
Status: DISCONTINUED | OUTPATIENT
Start: 2024-10-18 | End: 2024-10-20 | Stop reason: HOSPADM

## 2024-10-18 RX ORDER — FENTANYL CITRATE 50 UG/ML
INJECTION, SOLUTION INTRAMUSCULAR; INTRAVENOUS AS NEEDED
Status: DISCONTINUED | OUTPATIENT
Start: 2024-10-18 | End: 2024-10-18

## 2024-10-18 RX ORDER — OXYCODONE HYDROCHLORIDE 5 MG/1
5 TABLET ORAL EVERY 4 HOURS PRN
Status: DISCONTINUED | OUTPATIENT
Start: 2024-10-18 | End: 2024-10-18 | Stop reason: HOSPADM

## 2024-10-18 RX ORDER — LIDOCAINE HYDROCHLORIDE 10 MG/ML
INJECTION, SOLUTION INFILTRATION; PERINEURAL AS NEEDED
Status: DISCONTINUED | OUTPATIENT
Start: 2024-10-18 | End: 2024-10-18 | Stop reason: HOSPADM

## 2024-10-18 RX ORDER — ATORVASTATIN CALCIUM 40 MG/1
40 TABLET, FILM COATED ORAL DAILY
Status: DISCONTINUED | OUTPATIENT
Start: 2024-10-18 | End: 2024-10-20 | Stop reason: HOSPADM

## 2024-10-18 RX ORDER — MIDAZOLAM HYDROCHLORIDE 1 MG/ML
INJECTION INTRAMUSCULAR; INTRAVENOUS AS NEEDED
Status: DISCONTINUED | OUTPATIENT
Start: 2024-10-18 | End: 2024-10-18

## 2024-10-18 RX ORDER — PANTOPRAZOLE SODIUM 40 MG/1
40 TABLET, DELAYED RELEASE ORAL DAILY
Status: DISCONTINUED | OUTPATIENT
Start: 2024-10-18 | End: 2024-10-20 | Stop reason: HOSPADM

## 2024-10-18 RX ORDER — ALBUTEROL SULFATE 0.83 MG/ML
2.5 SOLUTION RESPIRATORY (INHALATION) ONCE AS NEEDED
Status: DISCONTINUED | OUTPATIENT
Start: 2024-10-18 | End: 2024-10-18 | Stop reason: HOSPADM

## 2024-10-18 RX ORDER — PROPOFOL 10 MG/ML
INJECTION, EMULSION INTRAVENOUS AS NEEDED
Status: DISCONTINUED | OUTPATIENT
Start: 2024-10-18 | End: 2024-10-18

## 2024-10-18 RX ORDER — CEFAZOLIN 1 G/1
INJECTION, POWDER, FOR SOLUTION INTRAVENOUS AS NEEDED
Status: DISCONTINUED | OUTPATIENT
Start: 2024-10-18 | End: 2024-10-18

## 2024-10-18 RX ORDER — HYDROCODONE BITARTRATE AND ACETAMINOPHEN 5; 325 MG/1; MG/1
2 TABLET ORAL EVERY 4 HOURS PRN
Status: DISCONTINUED | OUTPATIENT
Start: 2024-10-18 | End: 2024-10-20 | Stop reason: HOSPADM

## 2024-10-18 RX ORDER — METOPROLOL SUCCINATE 50 MG/1
25 TABLET, EXTENDED RELEASE ORAL DAILY
Status: DISCONTINUED | OUTPATIENT
Start: 2024-10-18 | End: 2024-10-20 | Stop reason: HOSPADM

## 2024-10-18 RX ORDER — ENOXAPARIN SODIUM 100 MG/ML
30 INJECTION SUBCUTANEOUS ONCE
Status: COMPLETED | OUTPATIENT
Start: 2024-10-18 | End: 2024-10-18

## 2024-10-18 RX ORDER — ENOXAPARIN SODIUM 100 MG/ML
40 INJECTION SUBCUTANEOUS DAILY
Status: DISCONTINUED | OUTPATIENT
Start: 2024-10-19 | End: 2024-10-20 | Stop reason: HOSPADM

## 2024-10-18 RX ORDER — ONDANSETRON HYDROCHLORIDE 2 MG/ML
INJECTION, SOLUTION INTRAVENOUS AS NEEDED
Status: DISCONTINUED | OUTPATIENT
Start: 2024-10-18 | End: 2024-10-18

## 2024-10-18 RX ORDER — DIPHENHYDRAMINE HYDROCHLORIDE 50 MG/ML
12.5 INJECTION INTRAMUSCULAR; INTRAVENOUS ONCE AS NEEDED
Status: DISCONTINUED | OUTPATIENT
Start: 2024-10-18 | End: 2024-10-18 | Stop reason: HOSPADM

## 2024-10-18 RX ORDER — DROPERIDOL 2.5 MG/ML
0.62 INJECTION, SOLUTION INTRAMUSCULAR; INTRAVENOUS ONCE AS NEEDED
Status: COMPLETED | OUTPATIENT
Start: 2024-10-18 | End: 2024-10-18

## 2024-10-18 RX ORDER — SODIUM CHLORIDE, SODIUM LACTATE, POTASSIUM CHLORIDE, CALCIUM CHLORIDE 600; 310; 30; 20 MG/100ML; MG/100ML; MG/100ML; MG/100ML
20 INJECTION, SOLUTION INTRAVENOUS CONTINUOUS
Status: ACTIVE | OUTPATIENT
Start: 2024-10-18 | End: 2024-10-19

## 2024-10-18 RX ORDER — HYDROMORPHONE HYDROCHLORIDE 2 MG/ML
INJECTION, SOLUTION INTRAMUSCULAR; INTRAVENOUS; SUBCUTANEOUS AS NEEDED
Status: DISCONTINUED | OUTPATIENT
Start: 2024-10-18 | End: 2024-10-18

## 2024-10-18 RX ORDER — INDOCYANINE GREEN AND WATER 25 MG
2.5 KIT INJECTION ONCE
Status: COMPLETED | OUTPATIENT
Start: 2024-10-18 | End: 2024-10-18

## 2024-10-18 RX ORDER — MEPERIDINE HYDROCHLORIDE 25 MG/ML
12.5 INJECTION INTRAMUSCULAR; INTRAVENOUS; SUBCUTANEOUS EVERY 10 MIN PRN
Status: DISCONTINUED | OUTPATIENT
Start: 2024-10-18 | End: 2024-10-18 | Stop reason: HOSPADM

## 2024-10-18 RX ORDER — AMLODIPINE BESYLATE 5 MG/1
5 TABLET ORAL
Status: DISCONTINUED | OUTPATIENT
Start: 2024-10-18 | End: 2024-10-20 | Stop reason: HOSPADM

## 2024-10-18 RX ORDER — ONDANSETRON HYDROCHLORIDE 2 MG/ML
4 INJECTION, SOLUTION INTRAVENOUS ONCE AS NEEDED
Status: DISCONTINUED | OUTPATIENT
Start: 2024-10-18 | End: 2024-10-18 | Stop reason: HOSPADM

## 2024-10-18 RX ORDER — ONDANSETRON 4 MG/1
4 TABLET, ORALLY DISINTEGRATING ORAL EVERY 8 HOURS PRN
Status: DISCONTINUED | OUTPATIENT
Start: 2024-10-18 | End: 2024-10-20 | Stop reason: HOSPADM

## 2024-10-18 RX ORDER — NITROGLYCERIN 0.4 MG/1
0.4 TABLET SUBLINGUAL EVERY 5 MIN PRN
Status: DISCONTINUED | OUTPATIENT
Start: 2024-10-18 | End: 2024-10-20 | Stop reason: HOSPADM

## 2024-10-18 RX ORDER — SODIUM CHLORIDE, SODIUM LACTATE, POTASSIUM CHLORIDE, CALCIUM CHLORIDE 600; 310; 30; 20 MG/100ML; MG/100ML; MG/100ML; MG/100ML
100 INJECTION, SOLUTION INTRAVENOUS CONTINUOUS
Status: DISCONTINUED | OUTPATIENT
Start: 2024-10-18 | End: 2024-10-18 | Stop reason: HOSPADM

## 2024-10-18 RX ORDER — SODIUM CHLORIDE, SODIUM LACTATE, POTASSIUM CHLORIDE, CALCIUM CHLORIDE 600; 310; 30; 20 MG/100ML; MG/100ML; MG/100ML; MG/100ML
20 INJECTION, SOLUTION INTRAVENOUS CONTINUOUS
Status: DISCONTINUED | OUTPATIENT
Start: 2024-10-18 | End: 2024-10-18

## 2024-10-18 RX ORDER — LABETALOL HYDROCHLORIDE 5 MG/ML
INJECTION, SOLUTION INTRAVENOUS AS NEEDED
Status: DISCONTINUED | OUTPATIENT
Start: 2024-10-18 | End: 2024-10-18

## 2024-10-18 RX ORDER — ASPIRIN 81 MG/1
81 TABLET ORAL DAILY
Status: DISCONTINUED | OUTPATIENT
Start: 2024-10-18 | End: 2024-10-20 | Stop reason: HOSPADM

## 2024-10-18 RX ORDER — CEFAZOLIN SODIUM 2 G/100ML
2 INJECTION, SOLUTION INTRAVENOUS ONCE
Status: DISCONTINUED | OUTPATIENT
Start: 2024-10-18 | End: 2024-10-18 | Stop reason: HOSPADM

## 2024-10-18 SDOH — SOCIAL STABILITY: SOCIAL INSECURITY: ARE THERE ANY APPARENT SIGNS OF INJURIES/BEHAVIORS THAT COULD BE RELATED TO ABUSE/NEGLECT?: NO

## 2024-10-18 SDOH — SOCIAL STABILITY: SOCIAL INSECURITY
WITHIN THE LAST YEAR, HAVE YOU BEEN RAPED OR FORCED TO HAVE ANY KIND OF SEXUAL ACTIVITY BY YOUR PARTNER OR EX-PARTNER?: NO

## 2024-10-18 SDOH — SOCIAL STABILITY: SOCIAL INSECURITY: HAVE YOU HAD THOUGHTS OF HARMING ANYONE ELSE?: NO

## 2024-10-18 SDOH — ECONOMIC STABILITY: INCOME INSECURITY: IN THE PAST 12 MONTHS HAS THE ELECTRIC, GAS, OIL, OR WATER COMPANY THREATENED TO SHUT OFF SERVICES IN YOUR HOME?: NO

## 2024-10-18 SDOH — SOCIAL STABILITY: SOCIAL INSECURITY: DOES ANYONE TRY TO KEEP YOU FROM HAVING/CONTACTING OTHER FRIENDS OR DOING THINGS OUTSIDE YOUR HOME?: NO

## 2024-10-18 SDOH — SOCIAL STABILITY: SOCIAL INSECURITY: WITHIN THE LAST YEAR, HAVE YOU BEEN HUMILIATED OR EMOTIONALLY ABUSED IN OTHER WAYS BY YOUR PARTNER OR EX-PARTNER?: NO

## 2024-10-18 SDOH — SOCIAL STABILITY: SOCIAL INSECURITY
WITHIN THE LAST YEAR, HAVE YOU BEEN KICKED, HIT, SLAPPED, OR OTHERWISE PHYSICALLY HURT BY YOUR PARTNER OR EX-PARTNER?: NO

## 2024-10-18 SDOH — SOCIAL STABILITY: SOCIAL INSECURITY: HAS ANYONE EVER THREATENED TO HURT YOUR FAMILY OR YOUR PETS?: NO

## 2024-10-18 SDOH — SOCIAL STABILITY: SOCIAL INSECURITY: DO YOU FEEL UNSAFE GOING BACK TO THE PLACE WHERE YOU ARE LIVING?: NO

## 2024-10-18 SDOH — ECONOMIC STABILITY: FOOD INSECURITY: WITHIN THE PAST 12 MONTHS, THE FOOD YOU BOUGHT JUST DIDN'T LAST AND YOU DIDN'T HAVE MONEY TO GET MORE.: NEVER TRUE

## 2024-10-18 SDOH — SOCIAL STABILITY: SOCIAL INSECURITY: HAVE YOU HAD ANY THOUGHTS OF HARMING ANYONE ELSE?: NO

## 2024-10-18 SDOH — ECONOMIC STABILITY: FOOD INSECURITY: WITHIN THE PAST 12 MONTHS, YOU WORRIED THAT YOUR FOOD WOULD RUN OUT BEFORE YOU GOT THE MONEY TO BUY MORE.: NEVER TRUE

## 2024-10-18 SDOH — SOCIAL STABILITY: SOCIAL INSECURITY: ARE YOU OR HAVE YOU BEEN THREATENED OR ABUSED PHYSICALLY, EMOTIONALLY, OR SEXUALLY BY ANYONE?: NO

## 2024-10-18 SDOH — SOCIAL STABILITY: SOCIAL INSECURITY: WITHIN THE LAST YEAR, HAVE YOU BEEN AFRAID OF YOUR PARTNER OR EX-PARTNER?: NO

## 2024-10-18 SDOH — SOCIAL STABILITY: SOCIAL INSECURITY: ABUSE: ADULT

## 2024-10-18 SDOH — SOCIAL STABILITY: SOCIAL INSECURITY: DO YOU FEEL ANYONE HAS EXPLOITED OR TAKEN ADVANTAGE OF YOU FINANCIALLY OR OF YOUR PERSONAL PROPERTY?: NO

## 2024-10-18 SDOH — SOCIAL STABILITY: SOCIAL INSECURITY: WERE YOU ABLE TO COMPLETE ALL THE BEHAVIORAL HEALTH SCREENINGS?: YES

## 2024-10-18 ASSESSMENT — COGNITIVE AND FUNCTIONAL STATUS - GENERAL
DAILY ACTIVITIY SCORE: 24
PATIENT BASELINE BEDBOUND: NO
MOBILITY SCORE: 24

## 2024-10-18 ASSESSMENT — PATIENT HEALTH QUESTIONNAIRE - PHQ9
1. LITTLE INTEREST OR PLEASURE IN DOING THINGS: NOT AT ALL
SUM OF ALL RESPONSES TO PHQ9 QUESTIONS 1 & 2: 0
2. FEELING DOWN, DEPRESSED OR HOPELESS: NOT AT ALL

## 2024-10-18 ASSESSMENT — ACTIVITIES OF DAILY LIVING (ADL)
TOILETING: INDEPENDENT
LACK_OF_TRANSPORTATION: NO
DRESSING YOURSELF: INDEPENDENT
HEARING - RIGHT EAR: FUNCTIONAL
PATIENT'S MEMORY ADEQUATE TO SAFELY COMPLETE DAILY ACTIVITIES?: YES
WALKS IN HOME: INDEPENDENT
HEARING - LEFT EAR: FUNCTIONAL
FEEDING YOURSELF: INDEPENDENT
ADEQUATE_TO_COMPLETE_ADL: YES
GROOMING: INDEPENDENT
BATHING: INDEPENDENT
JUDGMENT_ADEQUATE_SAFELY_COMPLETE_DAILY_ACTIVITIES: YES

## 2024-10-18 ASSESSMENT — PAIN SCALES - GENERAL
PAINLEVEL_OUTOF10: 0 - NO PAIN
PAINLEVEL_OUTOF10: 4
PAIN_LEVEL: 2
PAINLEVEL_OUTOF10: 7
PAINLEVEL_OUTOF10: 1

## 2024-10-18 ASSESSMENT — PAIN - FUNCTIONAL ASSESSMENT
PAIN_FUNCTIONAL_ASSESSMENT: 0-10

## 2024-10-18 ASSESSMENT — PAIN DESCRIPTION - ORIENTATION: ORIENTATION: RIGHT

## 2024-10-18 ASSESSMENT — LIFESTYLE VARIABLES
HOW MANY STANDARD DRINKS CONTAINING ALCOHOL DO YOU HAVE ON A TYPICAL DAY: PATIENT DOES NOT DRINK
HOW OFTEN DO YOU HAVE A DRINK CONTAINING ALCOHOL: NEVER
AUDIT-C TOTAL SCORE: 0
SKIP TO QUESTIONS 9-10: 1
AUDIT-C TOTAL SCORE: 0
HOW OFTEN DO YOU HAVE 6 OR MORE DRINKS ON ONE OCCASION: NEVER

## 2024-10-18 ASSESSMENT — PAIN DESCRIPTION - LOCATION
LOCATION: ABDOMEN
LOCATION: ABDOMEN

## 2024-10-18 NOTE — ANESTHESIA PREPROCEDURE EVALUATION
"Patient: Luis Bucio \"Hernán\"    Procedure Information       Date/Time: 10/18/24 1015    Procedure: CHOLECYSTECTOMY LAPAROSCOPY    Location: [facility] OR 06 / [facility] OR    Surgeons: Toñito Pichardo MD     Vitals:    10/18/24 0856   BP: 148/76   Pulse: 66   Resp: 17   Temp: 36 °C (96.8 °F)   SpO2: 96%       Past Surgical History:   Procedure Laterality Date   • CARDIAC SURGERY  07/22/2022    Left atrial appendage exclusion with  AtriClip 40   • CORONARY ARTERY BYPASS GRAFT  07/22/2022    ARREDONDO to LAD, radial sequential to obtuse marginal 1 and obtuse marginal 2, and saphenous vein graft to PDA   • INCISION AND DRAINAGE OF WOUND  09/06/2016    LEG     Past Medical History:   Diagnosis Date   • Atrial fibrillation with rapid ventricular response 07/23/2022    POST OP V RATE 145 / A RATE 153   • CAD (coronary artery disease) 07/22/2022    CABG   • Carotid stenosis, asymptomatic, bilateral 07/19/2022    Less than 50% stenosis   • Class 1 obesity with body mass index (BMI) of 34.0 to 34.9 in adult 10/10/2024   • Edema of both lower legs    • Gastritis and duodenitis    • GERD (gastroesophageal reflux disease)    • Hyperlipemia    • Hypertension    • Left carotid bruit     [facility] note   • Myocardial infarction 06/30/2022   • JENNIFER (obstructive sleep apnea)    • Recurrent acute pancreatitis ([facility]) 02/09/2024       Current Facility-Administered Medications:   •  ceFAZolin (Ancef) 2 g in dextrose (iso)  mL, 2 g, intravenous, Once, Toñito Pichardo MD  •  enoxaparin (Lovenox) syringe 30 mg, 30 mg, subcutaneous, Once, Toñito Pichardo MD  •  indocyanine green (IC-Green) injection 2.5 mg, 2.5 mg, intravenous, Once, Toñito Pichardo MD  •  lactated Ringer's infusion, 20 mL/hr, intravenous, Continuous, Lev Espana MD  Prior to Admission medications    Medication Sig Start Date End Date Taking? Authorizing Provider   amLODIPine (Norvasc) 5 mg tablet Take 1 tablet (5 mg) by mouth early in the morning.. 7/8/24 7/8/25  Alyx Lopez APRN-CNP "   aspirin 81 mg EC tablet Take 1 tablet (81 mg) by mouth once daily. 24  YUNIEL Gutierrez   atorvastatin (Lipitor) 40 mg tablet Take 1 tablet (40 mg) by mouth once daily. 24  YUNIEL Gutierrez   chlorhexidine (Hibiclens) 4 % external liquid Apply topically once daily for 5 days. 10/10/24 10/15/24  Sharon Beckham PA-C   chlorhexidine (Peridex) 0.12 % solution Use 15 mL in the mouth or throat once daily for 2 days. Once the night before surgery and once the morning of 10/10/24 10/12/24  Sharon Beckham PA-C   lipase-protease-amylase (Creon) 24,000-76,000 -120,000 unit capsule Take 2 capsules by mouth 3 times a day with meals. May also take 1 capsule with snacks for snacks. 2/8/24 10/10/24  Abundio Garrett DO   metoprolol succinate XL (Toprol-XL) 25 mg 24 hr tablet Take 1 tablet (25 mg) by mouth once daily. Do not crush, split or chew 24  YUNIEL Gutierrez   nitroglycerin (Nitrostat) 0.4 mg SL tablet Place 1 tablet (0.4 mg) under the tongue every 5 minutes if needed for chest pain. 24   Abundio Garrett DO   ondansetron ODT (Zofran-ODT) 4 mg disintegrating tablet Take 1 tablet (4 mg) by mouth every 8 hours if needed for nausea or vomiting. 10/6/24   Elder Alarcon DO   pantoprazole (ProtoNix) 40 mg EC tablet Take 1 tablet (40 mg) by mouth once daily. Do not crush, chew, or split. 10/6/24 11/5/24  Elder Alarcon, DO     No Known Allergies  Social History     Tobacco Use   • Smoking status: Former     Current packs/day: 0.00     Types: Cigarettes     Quit date:      Years since quittin.8     Passive exposure: Past (quit 1996)   • Smokeless tobacco: Never   • Tobacco comments:     1996 quit smoking   Substance Use Topics   • Alcohol use: Not Currently     Comment: Social with son will have 1-2 beers on rare occasion         Chemistry    Lab Results   Component Value Date/Time     10/06/2024 0629    K 3.9 10/06/2024 0629      10/06/2024 0629    CO2 28 10/06/2024 0629    BUN 16 10/06/2024 0629    CREATININE 1.24 10/06/2024 0629    Lab Results   Component Value Date/Time    CALCIUM 9.0 10/06/2024 0629    ALKPHOS 89 10/06/2024 0629    AST 18 10/06/2024 0629    ALT 21 10/06/2024 0629    BILITOT 0.5 10/06/2024 0629          Lab Results   Component Value Date/Time    WBC 11.4 (H) 10/06/2024 0629    HGB 14.1 10/06/2024 0629    HCT 41.1 10/06/2024 0629     10/06/2024 0629     Lab Results   Component Value Date/Time    PROTIME 13.5 (H) 05/30/2023 1305    INR 1.2 (H) 05/30/2023 1305     Encounter Date: 10/06/24   ECG 12 lead   Result Value    Ventricular Rate 75    Atrial Rate 75    ND Interval 164    QRS Duration 96    QT Interval 428    QTC Calculation(Bazett) 477    P Axis 55    R Axis -30    T Axis 115    QRS Count 12    Q Onset 210    P Onset 128    P Offset 189    T Offset 424    QTC Fredericia 460    Narrative    Normal sinus rhythm  Left axis deviation  Nonspecific ST and T wave abnormality  Abnormal ECG  When compared with ECG of 09-MAY-2024 12:16,  No significant change was found  See ED provider note for full interpretation and clinical correlation  Confirmed by Luisa Sewell ([id number]) on 10/7/2024 10:19:58 AM     No results found for this or any previous visit from the past 1095 days.        Relevant Problems   Cardiac   (+) Athscl heart disease of native cor art w unstable ang pctrs   (+) Chest pain   (+) Hyperlipidemia, mixed   (+) Hypertension   (+) Myocardial infarction (Multi)      Pulmonary   (+) Dyspnea on exertion      GI   (+) Acute hemorrhagic gastritis      Liver   (+) Calculus of gallbladder without cholecystitis without obstruction   (+) Recurrent acute pancreatitis ([facility])       Clinical information reviewed:      Problems  Med Hx             NPO Detail:  No data recorded     Physical Exam    Airway  Mallampati: II  TM distance: >3 FB     Cardiovascular   Rhythm: regular  (+) murmur     Dental    Pulmonary  - normal exam     Abdominal - normal exam           Anesthesia Plan    History of general anesthesia?: yes  History of complications of general anesthesia?: no    ASA 3     general     intravenous induction   Anesthetic plan and risks discussed with patient.    Plan discussed with CRNA and attending.

## 2024-10-18 NOTE — ANESTHESIA POSTPROCEDURE EVALUATION
"Patient: Luis Bucio \"Hernán\"    Procedure Summary       Date: 10/18/24 Room / Location: [facility] OR 06 / [facility]    Anesthesia Start: 1111 Anesthesia Stop: 1351    Procedure: CHOLECYSTECTOMY LAPAROSCOPY Diagnosis:       Calculus of gallbladder without cholecystitis without obstruction      (Calculus of gallbladder without cholecystitis without obstruction [K80.20])    Surgeons: Toñito Pichardo MD Responsible Provider: Cruz Smith MD    Anesthesia Type: general ASA Status: 3            Anesthesia Type: general    Vitals Value Taken Time   /83 10/18/24 1430   Temp 36.4 °C (97.5 °F) 10/18/24 1346   Pulse 82 10/18/24 1430   Resp 16 10/18/24 1430   SpO2 94 % 10/18/24 1500       Anesthesia Post Evaluation    Patient location during evaluation: PACU  Patient participation: complete - patient participated  Level of consciousness: awake  Pain score: 2  Pain management: adequate  Multimodal analgesia pain management approach  Airway patency: patent  Two or more strategies used to mitigate risk of obstructive sleep apnea  Cardiovascular status: acceptable  Respiratory status: acceptable  Hydration status: acceptable  Postoperative Nausea and Vomiting: none    There were no known notable events for this encounter.    "

## 2024-10-18 NOTE — OP NOTE
"CHOLECYSTECTOMY LAPAROSCOPY Operative Note     Date: 10/18/2024  OR Location: [facility] OR    Name: Luis Bucio \"Hernán\", : 1960, Age: 64 y.o., MRN: [id number], Sex: male    Diagnosis  Pre-op Diagnosis      * Calculus of gallbladder without cholecystitis without obstruction [K80.20] Post-op Diagnosis     * Calculus of gallbladder without cholecystitis without obstruction [K80.20]     Procedures  CHOLECYSTECTOMY LAPAROSCOPY  37302 - [place] LAPAROSCOPY SURG CHOLECYSTOENETEROSTOMY  Liver wedge biopsy   TAP block, right     Surgeons      * Toñito Pichardo - Primary    Resident/Fellow/Other Assistant:  Surgeons and Role:  * No surgeons found with a matching role *    Procedure Summary  Anesthesia: General  ASA: III  Anesthesia Staff: Anesthesiologist: Cruz Smith MD  CRNA: TORI Zavala-CRNA; TORI Bello-CRNA  Estimated Blood Loss: 30mL  Intra-op Medications:   Administrations occurring from 1015 to 1230 on 10/18/24:   Medication Name Total Dose   bupivacaine PF 0.5 % (Marcaine) 0.5 % (5 mg/mL) injection 20 mL   lidocaine (Xylocaine) 10 mg/mL (1 %) injection 20 mL   lactated Ringer's infusion 12.33 mL   ceFAZolin (Ancef) vial 1 g 2 g   dexAMETHasone (Decadron) injection 4 mg/mL 4 mg   fentaNYL (Sublimaze) injection 50 mcg/mL 100 mcg   HYDROmorphone (Dilaudid) injection 2 mg/mL 0.6 mg   labetalol (Normodyne,Trandate) injection 10 mg   lidocaine (cardiac) injection 2% prefilled syringe 100 mg   midazolam PF (Versed) injection 1 mg/mL 2 mg   phenylephrine 40 mcg/mL syringe 10 mL 400 mcg   propofol (Diprivan) injection 10 mg/mL 170 mg   rocuronium (ZeMuron) 50 mg/5 mL injection 80 mg              Anesthesia Record               Intraprocedure I/O Totals          Intake    lactated Ringer's infusion 1300.00 mL    Total Intake 1300 mL       Output    Est. Blood Loss 30 mL    Total Output 30 mL       Net    Net Volume 1270 mL          Specimen:   ID Type Source Tests Collected by Time   1 : RIGHT LIVER BIOPSY " Tissue LIVER BIOPSY RIGHT SURGICAL PATHOLOGY EXAM Toñito Pichardo MD 10/18/2024 1313   2 : GALLBLADDER AND CONTENTS Tissue GALLBLADDER CHOLECYSTECTOMY SURGICAL PATHOLOGY EXAM Toñito Pichardo MD 10/18/2024 1317        Staff:   Circulator: Zakiya  Scrub Person: Tahir Tim Scrub: Rhianna  Scrub Person: Norma Tim Circulator: Leslie      Findings: see below     Indications: Hernán Bucio is an 64 y.o. male who is having surgery for Calculus of gallbladder without cholecystitis without obstruction [K80.20].      The patient was seen in the preoperative area. The risks, benefits, complications, treatment options, non-operative alternatives, expected recovery and outcomes were discussed with the patient. The possibilities of reaction to medication, pulmonary aspiration, injury to surrounding structures, bleeding, recurrent infection, the need for additional procedures, failure to diagnose a condition, and creating a complication requiring transfusion or operation were discussed with the patient. The patient concurred with the proposed plan, giving informed consent.  The site of surgery was properly noted/marked if necessary per policy. The patient has been actively warmed in preoperative area. Preoperative antibiotics have been ordered and given within 1 hours of incision. Venous thrombosis prophylaxis have been ordered including bilateral sequential compression devices and chemical prophylaxis    Procedure Details:   After general anesthesia was administered, the patient was prepped and draped in the usual sterile fashion.  A supra-umbilical incision was carried down through the skin and subcutaneous tissue. A 12 mm port was placed using open Castano technique. Pneumoperitoneum was created by insufflating with [id number]  pressure at 15 mm Hg.  Three 5 mm ports were placed in the subxiphoid and right upper quadrant under direct vision.  The gallbladder was distended with thickened wall, which was consistent chronic inflammation.  This individual also has large amount of adipose tissue around the gallbladder. I had to clear up the fat tissue in order to identify the border of gallbladder. It was time consuming. The cystic duct and cystic artery were dissected from the surrounding structures. Critical view of safety was obtained.  The cystic duct was enlarge. It was divided. The distal end was then controlled with Endoloop and a clip. The cystic artery was clipped and divided. The gallbladder was taken from the liver bed with the hook cautery.  The gallbladder bed appeared to be hemostatic.  The gallbladder was placed in an EndoCatch bag which was temporarily stored on the omentum.  The right upper quadrant was cleaned up with suction.  Hemostasis appeared to be satisfactory. Preop US showed hepatomegaly and increased echogenicity throughout liver. We then performed a wedge liver biopsy at the anterior edge of right lobe as planned. Scissors were used. The biopsy site was then cauterized to achieve hemostasis. TAP block was then performed on the right side  with anesthetics as below. The EndoCatch bag was then removed from the umbilical port site without difficulty. All ports were removed. The abdomen was deflated.  The fascia at umbilicus was closed with #0 Vicryl. The subcutaneous tissue at each site was infiltrated with 0.5% Marcaine and 1% Lidocaine 1:1 mixture.  Skin was closed  with subcuticular 4-0 Vicryl. Skin glue was then applied.              Complications:  None; patient tolerated the procedure well.    Disposition: PACU - hemodynamically stable.  Condition: stable         Attending Attestation: I was present and scrubbed for the entire procedure.    Bradenelham Kylee  Phone Number: [phone]

## 2024-10-18 NOTE — ANESTHESIA PROCEDURE NOTES
Airway  Date/Time: 10/18/2024 11:33 AM  Urgency: elective    Airway not difficult    Staffing  Performed: CRNA   Authorized by: Cruz Smith MD    Performed by: TORI Zavala-SNEHA  Patient location during procedure: OR    Indications and Patient Condition  Indications for airway management: anesthesia and airway protection  Spontaneous Ventilation: absent  Sedation level: deep  Preoxygenated: yes  Patient position: sniffing  Mask difficulty assessment: 1 - vent by mask  Planned trial extubation    Final Airway Details  Final airway type: endotracheal airway      Successful airway: ETT  Cuffed: yes   Successful intubation technique: video laryngoscopy (Gonzalez)  Facilitating devices/methods: intubating stylet  Endotracheal tube insertion site: oral  Blade: Mathew  Blade size: #4  ETT size (mm): 7.5  Cormack-Lehane Classification: grade I - full view of glottis  Placement verified by: chest auscultation, capnometry and palpation of cuff   Measured from: lips  ETT to lips (cm): 23  Number of attempts at approach: 1

## 2024-10-19 PROCEDURE — 2500000001 HC RX 250 WO HCPCS SELF ADMINISTERED DRUGS (ALT 637 FOR MEDICARE OP): Performed by: SURGERY

## 2024-10-19 PROCEDURE — 96372 THER/PROPH/DIAG INJ SC/IM: CPT | Performed by: SURGERY

## 2024-10-19 PROCEDURE — 7100000011 HC EXTENDED STAY RECOVERY HOURLY - NURSING UNIT

## 2024-10-19 PROCEDURE — 2500000004 HC RX 250 GENERAL PHARMACY W/ HCPCS (ALT 636 FOR OP/ED): Performed by: SURGERY

## 2024-10-19 RX ORDER — HYDROCODONE BITARTRATE AND ACETAMINOPHEN 5; 325 MG/1; MG/1
1 TABLET ORAL EVERY 6 HOURS PRN
Qty: 20 TABLET | Refills: 0 | Status: SHIPPED | OUTPATIENT
Start: 2024-10-19

## 2024-10-19 RX ORDER — CYCLOBENZAPRINE HCL 5 MG
5 TABLET ORAL 3 TIMES DAILY
Qty: 21 TABLET | Refills: 0 | Status: SHIPPED | OUTPATIENT
Start: 2024-10-19 | End: 2024-10-26

## 2024-10-19 RX ORDER — DOCUSATE SODIUM 100 MG/1
100 CAPSULE, LIQUID FILLED ORAL 2 TIMES DAILY
Qty: 14 CAPSULE | Refills: 0 | Status: SHIPPED | OUTPATIENT
Start: 2024-10-19 | End: 2024-10-26

## 2024-10-19 ASSESSMENT — COGNITIVE AND FUNCTIONAL STATUS - GENERAL
DAILY ACTIVITIY SCORE: 24
MOBILITY SCORE: 24
MOBILITY SCORE: 24
DAILY ACTIVITIY SCORE: 24

## 2024-10-19 ASSESSMENT — PAIN - FUNCTIONAL ASSESSMENT
PAIN_FUNCTIONAL_ASSESSMENT: 0-10

## 2024-10-19 ASSESSMENT — PAIN SCALES - GENERAL
PAINLEVEL_OUTOF10: 4
PAINLEVEL_OUTOF10: 7
PAINLEVEL_OUTOF10: 4
PAINLEVEL_OUTOF10: 8
PAINLEVEL_OUTOF10: 5 - MODERATE PAIN
PAINLEVEL_OUTOF10: 4
PAINLEVEL_OUTOF10: 7

## 2024-10-19 ASSESSMENT — PAIN DESCRIPTION - LOCATION
LOCATION: ABDOMEN
LOCATION: ABDOMEN

## 2024-10-19 ASSESSMENT — ACTIVITIES OF DAILY LIVING (ADL): LACK_OF_TRANSPORTATION: NO

## 2024-10-19 ASSESSMENT — PAIN SCALES - WONG BAKER: WONGBAKER_NUMERICALRESPONSE: HURTS LITTLE BIT

## 2024-10-19 ASSESSMENT — PAIN DESCRIPTION - ORIENTATION: ORIENTATION: RIGHT

## 2024-10-19 NOTE — CARE PLAN
The patient's goals for the shift include  manage pain, tolerate diet, work towards discharge    The clinical goals for the shift include manage pain, tolerate diet, work towards discharge      Problem: Nutrition  Goal: Less than 5 days NPO/clear liquids  Outcome: Progressing  Goal: Oral intake greater than 50%  Outcome: Progressing  Goal: Oral intake greater 75%  Outcome: Progressing  Goal: Consume prescribed supplement  Outcome: Progressing  Goal: Adequate PO fluid intake  Outcome: Progressing  Goal: Nutrition support goals are met within 48 hrs  Outcome: Progressing  Goal: Nutrition support is meeting 75% of nutrient needs  Outcome: Progressing  Goal: Tube feed tolerance  Outcome: Progressing  Goal: BG  mg/dL  Outcome: Progressing  Goal: Lab values WNL  Outcome: Progressing  Goal: Electrolytes WNL  Outcome: Progressing  Goal: Promote healing  Outcome: Progressing  Goal: Maintain stable weight  Outcome: Progressing  Goal: Reduce weight from edema/fluid  Outcome: Progressing  Goal: Gradual weight gain  Outcome: Progressing  Goal: Improve ostomy output  Outcome: Progressing     Problem: Pain - Adult  Goal: Verbalizes/displays adequate comfort level or baseline comfort level  Outcome: Progressing     Problem: Safety - Adult  Goal: Free from fall injury  Outcome: Progressing     Problem: Discharge Planning  Goal: Discharge to home or other facility with appropriate resources  Outcome: Progressing

## 2024-10-19 NOTE — CARE PLAN
The patient's goals for the shift include      The clinical goals for the shift include Patient will have decrease in pain by end of shift    Over the shift, the patient did not make progress toward the following goals. Barriers to progression include . Recommendations to address these barriers include   Problem: Pain - Adult  Goal: Verbalizes/displays adequate comfort level or baseline comfort level  Outcome: Progressing  Flowsheets (Taken 10/19/2024 0020)  Verbalizes/displays adequate comfort level or baseline comfort level:   Encourage patient to monitor pain and request assistance   Assess pain using appropriate pain scale   .

## 2024-10-19 NOTE — DISCHARGE INSTRUCTIONS
PATIENT INSTRUCTIONS AFTER GALL BLADDER SURGERY (CHOLECYSTECTOMY)    FOLLOW-UP: Please make an appointment with Dr. Toñito Pichardo in 2 week. Call Dr. Pichardo office or come to Emergency Department (ED)  immediately if you have any fevers greater than 102.5 degrees Fahrenheit, drainage from your wound that is not clear or looks infected, persistent bleeding, increasing abdominal pain, problems urinating, or persistent nausea/vomiting or any concerns. You should be aware that you may have right shoulder pain after surgery and that this will progressively go away. This is called 'referred pain' and is from the area of the gallbladder. It can also be caused by gas that may be trapped under the diaphragm from the surgery, especially if it was performed laparoscopically through mini-incisions. This gas will progressively get reabsorbed by your body.     WOUND CARE INSTRUCTIONS: Keep a dry, clean dressing on the wound if there is drainage. The initial bandage may be removed after 24 hours. Once the wound has quit draining, you may leave it open to air. If clothing rubs against the wound or causes irritation and the wound is not draining, you may cover it with a dry dressing during the daytime. Try to keep the wound dry and avoid ointments on the wound unless directed to do so. If the wound becomes bright red and painful or starts to drain infected material that is not clear, please contact Dr. Pichardo office immediately.  You should also call if you begin to drain fluid that is thin and greenish-brown from the wound and appears to look like bile. If the wound is mildly pink and has a thick firm ridge underneath it, this is normal and is referred to as a healing ridge. This will resolve over the next 4-6 weeks.    If surgery is done in open fashion, staples/sutures will be removed at your follow up appointment if staples/sutures are used to close the skin.  If surgery is done laparoscopically, the incisions are usually covered with skin  glue. The sutures are under the skin. No suture removal is needed.     Some bruise around the incision is normal. You should call Dr. Pichardo office or come to ED with no delay if the bruise is expanding and painful.     DIET: You may eat any foods that you can tolerate. It is a good idea to eat a high fiber diet, and take in plenty of fluids to prevent constipation. If you do become constipated, you may want to take a mild laxative or take Dulcolax tablets on a daily basis until your bowel habits are regular. After recent abdominal surgery, constipation can be very uncomfortable and straining.    ACTIVITY: You are encouraged to cough and take deep breaths, or if you were given one, use your incentive spirometer every 15-30 minutes when awake. This will help prevent respiratory complications and low grade fevers post-operatively. You may want to hug a pillow when coughing and sneezing to add additional support to the surgical area which will decrease pain during these times. You are encouraged to walk and engage in light activity for the next two weeks. You should not lift more than 20 pounds during this time frame as it could put you at increased risk for a post-operative hernia. Twenty pounds is roughly equivalent to a plastic bag of groceries.     If surgery is done in open fashion, you should not lift more than 20 pounds or do sit ups for 6-8 weeks. You should also wear an abdominal binder for 6-8 weeks. You do not need to wear binder when you sleep.      MEDICATIONS: Try to take narcotic medications and anti-inflammatory medications, such as Tylenol, ibuprofen, Naprosyn, etc., with food. This will minimize stomach upset from the medication. Should you develop nausea and vomiting from the pain medication, or develop a rash, please discontinue the medication and contact your physician. You should not drive, make important decisions, or operate machinery when taking narcotic pain medication.    IF YOU TAKE BLOOD  THINNERS SUCH AS PLAVIX, XARELTO, ELIQUIS, COUMADIN OR OTHERS, YOU CAN RESTART THE MEDICATION ON THIS DATE:  10/20      QUESTIONS: Please feel free to call Dr. Pichardo office if you have any questions.

## 2024-10-19 NOTE — PROGRESS NOTES
10/19/24 1447   Discharge Planning   Living Arrangements Alone   Support Systems Children;Family members   Assistance Needed Alert and oriented x 3, Independent with ADL's, Drives but has no vehicle at this time, Shower chair, Grab bars   Type of Residence Private residence   Number of Stairs to Enter Residence 6   Number of Stairs Within Residence 0   Do you have animals or pets at home? No   Who is requesting discharge planning? Provider   Home or Post Acute Services None   Expected Discharge Disposition Home   Does the patient need discharge transport arranged? No   Financial Resource Strain   How hard is it for you to pay for the very basics like food, housing, medical care, and heating? Not hard   Housing Stability   In the last 12 months, was there a time when you were not able to pay the mortgage or rent on time? N   In the past 12 months, how many times have you moved where you were living? 0   At any time in the past 12 months, were you homeless or living in a shelter (including now)? N   Transportation Needs   In the past 12 months, has lack of transportation kept you from medical appointments or from getting medications? no   In the past 12 months, has lack of transportation kept you from meetings, work, or from getting things needed for daily living? No   Patient Choice   Provider Choice list and CMS website (https://medicare.gov/care-compare#search) for post-acute Quality and Resource Measure Data were provided and reviewed with: Patient   Patient / Family choosing to utilize agency / facility established prior to hospitalization No

## 2024-10-19 NOTE — DISCHARGE SUMMARY
Discharge Diagnosis  Calculus of gallbladder without cholecystitis without obstruction    Issues Requiring Follow-Up  Postop     Test Results Pending At Discharge  Pending Labs       Order Current Status    Surgical Pathology Exam In process            Hospital Course  Had lap tamera and wedge liver biopsy on 10/18.   Did well after surgery.   Pain controlled.   Diet advanced   Discharged in stable condition     Pertinent Physical Exam At Time of Discharge  Comfortable   Not labored breathing   Abdomen soft, NT,ND. Incisions clean     Home Medications     Medication List      START taking these medications     cyclobenzaprine 5 mg tablet; Commonly known as: Flexeril; Take 1 tablet   (5 mg) by mouth 3 times a day for 7 days.   docusate sodium 100 mg capsule; Commonly known as: Colace; Take 1   capsule (100 mg) by mouth 2 times a day for 7 days. Hold for loose stool   HYDROcodone-acetaminophen 5-325 mg tablet; Commonly known as: Norco;   Take 1 tablet by mouth every 6 hours if needed for severe pain (7 - 10) or   moderate pain (4 - 6) (ok to take 2 pills for severe pain).     CONTINUE taking these medications     amLODIPine 5 mg tablet; Commonly known as: Norvasc; Take 1 tablet (5 mg)   by mouth early in the morning..   aspirin 81 mg EC tablet; Take 1 tablet (81 mg) by mouth once daily.   atorvastatin 40 mg tablet; Commonly known as: Lipitor; Take 1 tablet (40   mg) by mouth once daily.   Creon 24,000-76,000 -120,000 unit capsule; Generic drug:   lipase-protease-amylase; Take 2 capsules by mouth 3 times a day with   meals. May also take 1 capsule with snacks for snacks.   metoprolol succinate XL 25 mg 24 hr tablet; Commonly known as:   Toprol-XL; Take 1 tablet (25 mg) by mouth once daily. Do not crush, split   or chew   ondansetron ODT 4 mg disintegrating tablet; Commonly known as:   Zofran-ODT; Take 1 tablet (4 mg) by mouth every 8 hours if needed for   nausea or vomiting.   pantoprazole 40 mg EC tablet; Commonly  known as: ProtoNix; Take 1 tablet   (40 mg) by mouth once daily. Do not crush, chew, or split.     ASK your doctor about these medications     * chlorhexidine 4 % external liquid; Commonly known as: Hibiclens; Apply   topically once daily for 5 days.; Ask about: Should I take this   medication?   * chlorhexidine 0.12 % solution; Commonly known as: Peridex; Use 15 mL   in the mouth or throat once daily for 2 days. Once the night before   surgery and once the morning of; Ask about: Should I take this medication?   nitroglycerin 0.4 mg SL tablet; Commonly known as: Nitrostat; Place 1   tablet (0.4 mg) under the tongue every 5 minutes if needed for chest pain.  * This list has 2 medication(s) that are the same as other medications   prescribed for you. Read the directions carefully, and ask your doctor or   other care provider to review them with you.       Outpatient Follow-Up  Future Appointments   Date Time Provider Department Center   1/13/2025  2:00 PM TORI Gutierrez-CNP [id number] Brad Pichardo MD

## 2024-10-20 VITALS
HEART RATE: 90 BPM | HEIGHT: 73 IN | DIASTOLIC BLOOD PRESSURE: 76 MMHG | SYSTOLIC BLOOD PRESSURE: 110 MMHG | RESPIRATION RATE: 16 BRPM | BODY MASS INDEX: 32.43 KG/M2 | OXYGEN SATURATION: 90 % | WEIGHT: 244.71 LBS | TEMPERATURE: 98.1 F

## 2024-10-20 PROCEDURE — 2500000001 HC RX 250 WO HCPCS SELF ADMINISTERED DRUGS (ALT 637 FOR MEDICARE OP): Performed by: SURGERY

## 2024-10-20 PROCEDURE — 96372 THER/PROPH/DIAG INJ SC/IM: CPT | Performed by: SURGERY

## 2024-10-20 PROCEDURE — 7100000011 HC EXTENDED STAY RECOVERY HOURLY - NURSING UNIT

## 2024-10-20 PROCEDURE — 2500000004 HC RX 250 GENERAL PHARMACY W/ HCPCS (ALT 636 FOR OP/ED): Performed by: SURGERY

## 2024-10-20 ASSESSMENT — PAIN SCALES - GENERAL
PAINLEVEL_OUTOF10: 3
PAINLEVEL_OUTOF10: 7
PAINLEVEL_OUTOF10: 3

## 2024-10-20 ASSESSMENT — PAIN - FUNCTIONAL ASSESSMENT: PAIN_FUNCTIONAL_ASSESSMENT: 0-10

## 2024-10-20 NOTE — CARE PLAN
The patient's goals for the shift include  walk, manage pain, have BM, discharge home    The clinical goals for the shift include manage pain, walk, have BM, tolerate diet, discharge home      Problem: Safety - Adult  Goal: Free from fall injury  Outcome: Progressing     Problem: Pain - Adult  Goal: Verbalizes/displays adequate comfort level or baseline comfort level  Outcome: Progressing     Problem: Discharge Planning  Goal: Discharge to home or other facility with appropriate resources  Outcome: Progressing

## 2024-10-20 NOTE — PROGRESS NOTES
"General Surgery/Trauma Inpatient Progress Note    Interval History:  Luis Bucio \"Hernán\" is a 64 y.o. male who is      Overnight Event:   None     Complaints:   Was feeling nauseous after eating yesterday. Patient requested to stay one more day due to lack of family support.   Feels well today. Passed flatus.     Past Medical History:  Past Medical History:   Diagnosis Date    Atrial fibrillation with rapid ventricular response 07/23/2022    POST OP V RATE 145 / A RATE 153    CAD (coronary artery disease) 07/22/2022    CABG    Carotid stenosis, asymptomatic, bilateral 07/19/2022    Less than 50% stenosis    Class 1 obesity with body mass index (BMI) of 34.0 to 34.9 in adult 10/10/2024    Edema of both lower legs     Gastritis and duodenitis     GERD (gastroesophageal reflux disease)     Hyperlipemia     Hypertension     Left carotid bruit     [facility] note    Myocardial infarction 06/30/2022    JENNIFER (obstructive sleep apnea)     Recurrent acute pancreatitis ([facility]) 02/09/2024        Past Surgical History:  Past Surgical History:   Procedure Laterality Date    CARDIAC SURGERY  07/22/2022    Left atrial appendage exclusion with  AtriClip 40    CORONARY ARTERY BYPASS GRAFT  07/22/2022    ARREDONDO to LAD, radial sequential to obtuse marginal 1 and obtuse marginal 2, and saphenous vein graft to PDA    INCISION AND DRAINAGE OF WOUND  09/06/2016    LEG        Medications:  Current Outpatient Medications   Medication Instructions    amLODIPine (NORVASC) 5 mg, oral, Daily (0630)    aspirin 81 mg, oral, Daily    atorvastatin (LIPITOR) 40 mg, oral, Daily    cyclobenzaprine (FLEXERIL) 5 mg, oral, 3 times daily    docusate sodium (COLACE) 100 mg, oral, 2 times daily, Hold for loose stool    HYDROcodone-acetaminophen (Norco) 5-325 mg tablet 1 tablet, oral, Every 6 hours PRN    lipase-protease-amylase (Creon) 24,000-76,000 -120,000 unit capsule Take 2 capsules by mouth 3 times a day with meals. May also take 1 capsule with snacks for " snacks.    metoprolol succinate XL (TOPROL-XL) 25 mg, oral, Daily, Do not crush, split or chew    nitroglycerin (NITROSTAT) 0.4 mg, sublingual, Every 5 min PRN    ondansetron ODT (ZOFRAN-ODT) 4 mg, oral, Every 8 hours PRN    pantoprazole (PROTONIX) 40 mg, oral, Daily, Do not crush, chew, or split.        Allergies:  No Known Allergies     Family History:  No family history on file.     Social History:  Social History     Socioeconomic History    Marital status:    Tobacco Use    Smoking status: Former     Current packs/day: 0.00     Types: Cigarettes     Quit date:      Years since quittin.8     Passive exposure: Past (quit 1996)    Smokeless tobacco: Never    Tobacco comments:     1996 quit smoking   Vaping Use    Vaping status: Never Used   Substance and Sexual Activity    Alcohol use: Not Currently     Comment: Social with son will have 1-2 beers on rare occasion    Drug use: Yes     Types: Marijuana     Comment: weekly     Social Drivers of Health     Financial Resource Strain: Low Risk  (10/19/2024)    Overall Financial Resource Strain (CARDIA)     Difficulty of Paying Living Expenses: Not hard at all   Food Insecurity: No Food Insecurity (10/18/2024)    Hunger Vital Sign     Worried About Running Out of Food in the Last Year: Never true     Ran Out of Food in the Last Year: Never true   Transportation Needs: No Transportation Needs (10/19/2024)    PRAPARE - Transportation     Lack of Transportation (Medical): No     Lack of Transportation (Non-Medical): No   Intimate Partner Violence: Not At Risk (10/18/2024)    Humiliation, Afraid, Rape, and Kick questionnaire     Fear of Current or Ex-Partner: No     Emotionally Abused: No     Physically Abused: No     Sexually Abused: No   Housing Stability: Low Risk  (10/19/2024)    Housing Stability Vital Sign     Unable to Pay for Housing in the Last Year: No     Number of Times Moved in the Last Year: 0     Homeless in the Last Year: No     "    Review of Systems:  A complete 12 point review of systems was performed. It is otherwise negative except as noted in the above interval history.     Vital Signs:  Vitals:    10/20/24 0514   BP: 110/76   Pulse: 90   Resp: 16   Temp: 36.7 °C (98.1 °F)   SpO2: 90%      Body mass index is 32.29 kg/m².      Physical Exam:  General: No acute distress.   Neuro: Alert and oriented ×3. Follows commands.  Face: Atraumatic, no visible skin lesion.   Head: Atraumatic  Eyes: Pupils equal reactive to light. Extraocular motions intact.  Ears: Hears normal speaking voice.  Mouth, Nose, Throat: Mucous membranes moist.  Normal dentition.  Neck: Supple. No visible masses.  Breast: Not examined.   Lung: Patent airway and no labored breath.   Vascular: Palpable radial pulses bilaterally.  Abdomen: soft, NT, ND.  Incisions clean   Rectal and Perianal: Not examined.   Genitourinary: Not examined.   Musculoskeletal: Moves all extremities.  Normal range of motion.  Extremities: No cyanosis. No edema.   Lymphatic: No palpable lymph nodes.  Skin: No rashes or lesions.  Psychological: Normal affect      Laboratory Values:  CBC: No results found for: \"WBC\", \"RBC\", \"HGB\", \"HCT\", \"PLT\"    RFP: No results found for: \"GLUF\", \"NA\", \"K\", \"CL\", \"CO2\", \"BUN\", \"CREATININE\", \"CALCIUM\", \"MG\", \"PHOS\"     LFTs: No results found for: \"PROT\", \"ALBUMIN\", \"BILITOT\", \"BILIDIR\", \"ALKPHOS\", \"AST\", \"ALT\"         Imaging:  I have personally reviewed the images and the radiologist's report.  ECG 12 lead    Result Date: 10/7/2024  Normal sinus rhythm Nonspecific ST and T wave abnormality Prolonged QT Abnormal ECG When compared with ECG of 06-OCT-2024 06:03, (unconfirmed) No significant change was found See ED provider note for full interpretation and clinical correlation Confirmed by Luisa Sewell ([id number]) on 10/7/2024 10:20:21 AM    ECG 12 lead    Result Date: 10/7/2024  Normal sinus rhythm Left axis deviation Nonspecific ST and T wave abnormality Abnormal " ECG When compared with ECG of 09-MAY-2024 12:16, No significant change was found See ED provider note for full interpretation and clinical correlation Confirmed by Luisa Sewell ([id number]) on 10/7/2024 10:19:58 AM    US gallbladder    Result Date: 10/6/2024  Interpreted By:  Erasmo Goodwin, STUDY: US GALLBLADDER  10/6/2024 8:52 am   INDICATION: 63 y/o   M with  Signs/Symptoms:RUq pain, abnormal CT gallbladder, ?choleycystitis.   COMPARISON: None.   ACCESSION NUMBER(S): [id number]   ORDERING CLINICIAN: ANNETTA WINTERS   TECHNIQUE: Routine ultrasound of the right upper quadrant was performed using grayscale imaging, color Doppler, and spectral Doppler.   FINDINGS: LIVER: Craniocaudal length: 19.2 cm.  This is  within normal limits. Echogenicity:  Diffusely increased. Mass:  None   GALLBLADDER: Multiple small layering stones along the floor of the gallbladder. No wall thickening, or adjacent edema  . The gallbladder wall thickness is 0.2 cm. Sonographic Rangel's sign is negative.   BILE DUCTS: No intrahepatic biliary ductal dilatation. Common bile duct measured 0.7 cm   in diameter. This is within the limits of normal.   PANCREAS: The pancreas was obscured by bowel gas..   RIGHT KIDNEY: Craniocaudal length:  9.0 cm , Within normal limits of size for age. Echogenicity was normal. No hydronephrosis, shadowing stone, or perinephric edema. No gross right renal mass.   PERITONEAL FLUID: None seen in the right upper quadrant.         Hepatomegaly.  Nonspecific increased echogenicity throughout the liver, most likely fatty infiltration.   Obscuration of the pancreas by bowel gas.   Cholelithiasis, currently without specific sonographic evidence of acute cholecystitis.   MACRO: None   Signed by: Erasmo Goodwin 10/6/2024 9:02 AM Dictation workstation:   [id number]    CT abdomen pelvis wo IV contrast    Result Date: 10/6/2024  Interpreted By:  James Mason, STUDY: CT ABDOMEN PELVIS WO IV CONTRAST;  10/6/2024 7:14 am    INDICATION: Signs/Symptoms:ABD pain nausea, h/o pancreatitis.   COMPARISON: 02/09/2024   ACCESSION NUMBER(S): [id number]   ORDERING CLINICIAN: ANNETTA WINTERS   TECHNIQUE: CT of the abdomen and pelvis was performed. No oral, no intravenous contrast.   FINDINGS: LOWER CHEST: Images of the lung bases show no infiltrate or pleural fluid.   ABDOMEN:   LIVER: There is no hepatic mass.   BILE DUCTS: There is no intrahepatic, common hepatic or common bile ductal dilatation.   GALLBLADDER: Layering sludge. Trace pericholecystic stranding. No gross wall thickening or pericholecystic fluid appreciated.   PANCREAS: Near complete resolution of the previously seen inflammatory changes about the pancreas. No pancreatic stones or mass lesions appreciated.   SPLEEN: The spleen is unremarkable. There is no splenic mass or splenomegaly.   ADRENAL GLANDS: The adrenal glands are unremarkable.   KIDNEYS, URETERS and BLADDER: The kidneys demonstrate no mass.  There is no intrarenal calculus or hydronephrosis.   BOWEL: Small duodenal diverticulum. There is no bowel wall thickening, dilatation or obstruction. Appendix unremarkable. Fecalization of the terminal ileum in the absence of obstruction or inflammatory changes.   VESSELS: No aneurysm. IVC within normal limits.   PERITONEUM/RETROPERITONEUM/LYMPH NODES: There is no retroperitoneal or pelvic adenopathy.  There is no ascites.   REPRODUCTIVE ORGANS: Unremarkable.   ABDOMINAL WALL: The abdominal wall is unremarkable.   BONE AND SOFT TISSUE: There is no acute osseous finding.   There is no soft tissue abnormality.       1. Gallbladder contains layering sludge with minimal pericholecystic stranding. No gross wall thickening or pericholecystic fluid. Correlate clinically for early cholecystitis. 2. No acute process otherwise identified within the abdomen or pelvis.   MACRO: None.   Signed by: James Mason 10/6/2024 7:40 AM Dictation workstation:   [id number]        Assessment:  This is  a 64 y.o. male who has below conditions:   Pod2, s/p lap tamera   Doing well       Plan:  OK to go home once ride is available   Follow up in [place] office   Call if any concern     Toñito Pichardo MD [facility]  General Surgery  Office: [phone]  Fax:     [phone]  12:30 PM   10/20/24

## 2024-10-20 NOTE — CARE PLAN
Problem: Nutrition  Goal: Promote healing  Outcome: Progressing     Problem: Fall/Injury  Goal: Not fall by end of shift  Outcome: Progressing     Problem: Pain - Adult  Goal: Verbalizes/displays adequate comfort level or baseline comfort level  Outcome: Progressing   The patient's goals for the shift include pain management and adequate rest     The clinical goals for the shift include manage pain, tolerate diet, walk, work towards discharge

## 2024-10-23 NOTE — SIGNIFICANT EVENT
Follow Up Phone Call    Outgoing phone call    Spoke to: Luis Bucio Relationship:self   Phone number: [phone]      Outcome: contacted patient/ family   No chief complaint on file.         Diagnosis:Not applicable    States his dressing is dry and intact, denies fever or chills.  Bowels are moving.  Feeling better, no further questions.

## 2024-11-04 LAB
LABORATORY COMMENT REPORT: NORMAL
PATH REPORT.COMMENTS IMP SPEC: NORMAL
PATH REPORT.FINAL DX SPEC: NORMAL
PATH REPORT.GROSS SPEC: NORMAL
PATH REPORT.TOTAL CANCER: NORMAL
RESIDENT REVIEW: NORMAL

## 2024-11-14 ENCOUNTER — OFFICE VISIT (OUTPATIENT)
Facility: HOSPITAL | Age: 64
End: 2024-11-14
Payer: MEDICAID

## 2024-11-14 ENCOUNTER — APPOINTMENT (OUTPATIENT)
Facility: HOSPITAL | Age: 64
End: 2024-11-14
Payer: MEDICAID

## 2024-11-14 VITALS
DIASTOLIC BLOOD PRESSURE: 93 MMHG | OXYGEN SATURATION: 96 % | TEMPERATURE: 98.1 F | HEART RATE: 83 BPM | SYSTOLIC BLOOD PRESSURE: 137 MMHG | RESPIRATION RATE: 17 BRPM | WEIGHT: 238.6 LBS | BODY MASS INDEX: 33.4 KG/M2 | HEIGHT: 71 IN

## 2024-11-14 DIAGNOSIS — Z09 POSTOPERATIVE EXAMINATION: Primary | ICD-10-CM

## 2024-11-14 PROCEDURE — 3075F SYST BP GE 130 - 139MM HG: CPT | Performed by: SURGERY

## 2024-11-14 PROCEDURE — 99024 POSTOP FOLLOW-UP VISIT: CPT | Performed by: SURGERY

## 2024-11-14 PROCEDURE — 3080F DIAST BP >= 90 MM HG: CPT | Performed by: SURGERY

## 2024-11-14 PROCEDURE — 1036F TOBACCO NON-USER: CPT | Performed by: SURGERY

## 2024-11-14 PROCEDURE — 3008F BODY MASS INDEX DOCD: CPT | Performed by: SURGERY

## 2024-11-14 ASSESSMENT — PAIN SCALES - GENERAL: PAINLEVEL_OUTOF10: 0-NO PAIN

## 2024-11-14 NOTE — PROGRESS NOTES
"Subjective   Patient ID: Luis Bucio \"Hernán\" is a 64 y.o. male who presents for Follow-up (Patient follow up).  HPI  Had lap tamera on 10/18  Doing well  No problem with eating or bowel function   Feels some bloated       Review of Systems   All other systems reviewed and are negative.      Objective   Physical Exam  Constitutional:       Appearance: Normal appearance.   Pulmonary:      Effort: Pulmonary effort is normal.   Abdominal:      Comments: Soft, NT,ND  Incisions healed well          Assessment/Plan   S/P above surgery   Doing well     Follow up with me as needed   Discussed pathology   Call if any concern          Toñito Pichardo MD 11/14/24 12:16 PM   "

## 2025-01-13 ENCOUNTER — APPOINTMENT (OUTPATIENT)
Dept: CARDIOLOGY | Facility: CLINIC | Age: 65
End: 2025-01-13
Payer: MEDICAID

## 2025-01-13 VITALS
DIASTOLIC BLOOD PRESSURE: 80 MMHG | OXYGEN SATURATION: 95 % | WEIGHT: 246 LBS | SYSTOLIC BLOOD PRESSURE: 124 MMHG | HEIGHT: 71 IN | HEART RATE: 79 BPM | BODY MASS INDEX: 34.44 KG/M2

## 2025-01-13 DIAGNOSIS — E78.2 HYPERLIPIDEMIA, MIXED: ICD-10-CM

## 2025-01-13 DIAGNOSIS — I25.5 ISCHEMIC CARDIOMYOPATHY: Primary | ICD-10-CM

## 2025-01-13 DIAGNOSIS — I35.0 MODERATE AORTIC STENOSIS: ICD-10-CM

## 2025-01-13 DIAGNOSIS — Z95.1 S/P CABG X 4: ICD-10-CM

## 2025-01-13 DIAGNOSIS — I25.110: ICD-10-CM

## 2025-01-13 DIAGNOSIS — R06.09 DYSPNEA ON EXERTION: ICD-10-CM

## 2025-01-13 DIAGNOSIS — I10 PRIMARY HYPERTENSION: ICD-10-CM

## 2025-01-13 PROCEDURE — 1036F TOBACCO NON-USER: CPT | Performed by: NURSE PRACTITIONER

## 2025-01-13 PROCEDURE — 3079F DIAST BP 80-89 MM HG: CPT | Performed by: NURSE PRACTITIONER

## 2025-01-13 PROCEDURE — 99214 OFFICE O/P EST MOD 30 MIN: CPT | Performed by: NURSE PRACTITIONER

## 2025-01-13 PROCEDURE — 3008F BODY MASS INDEX DOCD: CPT | Performed by: NURSE PRACTITIONER

## 2025-01-13 PROCEDURE — 3074F SYST BP LT 130 MM HG: CPT | Performed by: NURSE PRACTITIONER

## 2025-01-13 RX ORDER — ATORVASTATIN CALCIUM 80 MG/1
80 TABLET, FILM COATED ORAL DAILY
Qty: 90 TABLET | Refills: 3 | Status: SHIPPED | OUTPATIENT
Start: 2025-01-13 | End: 2026-01-13

## 2025-01-13 NOTE — PROGRESS NOTES
"Primary Care Physician: Michaelle Knight DO  Primary Cardiologist:       Date of Visit: 01/13/2025  2:30 PM EST  Location of visit:  [facility]   Type of Visit: Follow up             Chief Complaint   Patient presents with    Follow-up     Follow up   Dyspnea on exertion          HPI / Summary:   Luis Bucio is a 64 y.o. male  with   CAD s/p CABG x4 (LIMA to LAD, radial sequential to obtuse marginal 1 and obtuse marginal 2, and saphenous vein graft to PDA.Endoscopic vein and radial artery harvest. Left atrial appendage exclusion with AtriClip 40) 7/22/2022, Dr. Maier, HTN, HLD, moderately reduced LV systolic function 45-50%, post op AFib --> NSR. who returns for  routine follow up       Mainly sedentary but does walk for exercise as the weather allows, c/o dyspnea on moderate exertion  Atypical \"tingling\" type sensations across the torso unrelated to exertion,  down his arms into fingers       12 system review is negative except as noted above         Vitals:    01/13/25 1409   Weight: 112 kg (246 lb)       Medical History:   Past Medical History:   Diagnosis Date    Atrial fibrillation with rapid ventricular response 07/23/2022    POST OP V RATE 145 / A RATE 153    CAD (coronary artery disease) 07/22/2022    CABG    Carotid stenosis, asymptomatic, bilateral 07/19/2022    Less than 50% stenosis    Class 1 obesity with body mass index (BMI) of 34.0 to 34.9 in adult 10/10/2024    Edema of both lower legs     Gastritis and duodenitis     GERD (gastroesophageal reflux disease)     Hyperlipemia     Hypertension     Left carotid bruit     [facility] note    Myocardial infarction 06/30/2022    JENNIFER (obstructive sleep apnea)     Recurrent acute pancreatitis ([facility]) 02/09/2024       Social History:   Tobacco Use: Medium Risk (1/13/2025)    Patient History     Smoking Tobacco Use: Former     Smokeless Tobacco Use: Never     Passive Exposure: Past         MEDICATIONS:   Current Outpatient Medications   Medication Instructions "    amLODIPine (NORVASC) 5 mg, oral, Daily (0630)    aspirin 81 mg, oral, Daily    atorvastatin (LIPITOR) 80 mg, oral, Daily    metoprolol succinate XL (TOPROL-XL) 25 mg, oral, Daily, Do not crush, split or chew    nitroglycerin (NITROSTAT) 0.4 mg, sublingual, Every 5 min PRN    ondansetron ODT (ZOFRAN-ODT) 4 mg, oral, Every 8 hours PRN    pantoprazole (PROTONIX) 40 mg, oral, Daily, Do not crush, chew, or split.         IMAGING REVIEWED:      Echocardiogram 5/24/2023  CONCLUSIONS:  1. Left ventricular systolic function is normal with a 60-65% estimated  ejection fraction.  2. Spectral Doppler shows an impaired relaxation pattern of left ventricular  diastolic filling.  3. There is moderate mitral annular calcification.  4. RVSP within normal limits.  5. Aortic valve appears abnormal.  6. Mild to moderate aortic valve stenosis.  7. There is moderate aortic valve cusp calcification.      NM stress testing 4/05/2023  IMPRESSION:  1. Questionable mild reversibility along the lateral wall and  inferior wall with mild right ventricular prominence. Though findings  may be artifactual, constellation of findings are nonspecific and  colon to question multi-vessel involvement. If there is a high  clinical suspicion, further evaluation, possibly with an ammonia  PET-CT, would be of value.  2. Normal left ventricular size.  3. Estimated LV ejection fraction of 64% at rest and 54% during  stress (normal above 45 percent).       Cardiovascular Catheterization Report 6/30/2022  CONCLUSIONS:  1. Left main: no significant angiographic disease.  2. LAD: 70% prox-mid disease.  3. LCx: 70% mid-vessel disease, 100% proximal thrombotic OM1 that fills via left to left collaterals.  4. RCA: 80% distal stenosis.  5. LVEDP 12mmHg, mild aortic stenosis on LV-Ao gradient.      LABS:  CBC with Differential:    Lab Results   Component Value Date    WBC 11.4 (H) 10/06/2024    RBC 3.95 (L) 10/06/2024    HGB 14.1 10/06/2024    HCT 41.1 10/06/2024     " 10/06/2024     (H) 10/06/2024    MCH 35.7 (H) 10/06/2024    MCHC 34.3 10/06/2024    RDW 14.0 10/06/2024    NRBC 0.0 10/06/2024    LYMPHOPCT 21.5 10/06/2024    MONOPCT 6.9 10/06/2024    EOSPCT 1.1 10/06/2024    BASOPCT 0.7 10/06/2024    MONOSABS 0.79 10/06/2024    LYMPHSABS 2.45 10/06/2024    EOSABS 0.12 10/06/2024    BASOSABS 0.08 10/06/2024     CMP:    Lab Results   Component Value Date     10/06/2024    K 3.9 10/06/2024     10/06/2024    CO2 28 10/06/2024    BUN 16 10/06/2024    CREATININE 1.24 10/06/2024    GLUCOSE 135 (H) 10/06/2024    PROT 7.5 10/06/2024    CALCIUM 9.0 10/06/2024    BILITOT 0.5 10/06/2024    ALKPHOS 89 10/06/2024    AST 18 10/06/2024    ALT 21 10/06/2024     BMP:    Lab Results   Component Value Date     10/06/2024    K 3.9 10/06/2024     10/06/2024    CO2 28 10/06/2024    BUN 16 10/06/2024    CREATININE 1.24 10/06/2024    CALCIUM 9.0 10/06/2024    GLUCOSE 135 (H) 10/06/2024     Magnesium:  Lab Results   Component Value Date    MG 2.03 10/06/2024     Troponin:    Lab Results   Component Value Date    TROPHS 5 10/06/2024    TROPHS 5 10/06/2024    TROPHS 3 05/09/2024     BNP:   Lab Results   Component Value Date    BNP 52 09/04/2024         Lipid Panel:  Lab Results   Component Value Date    HDL 27.1 07/17/2024    CHHDL 5.6 07/17/2024    VLDL 19 07/17/2024    TRIG 97 07/17/2024    NHDL 125 07/17/2024        Lab work and imaging results independently reviewed by me     Visit Vitals  /80   Pulse 79   Ht 1.803 m (5' 11\")   Wt 112 kg (246 lb)   SpO2 95%   BMI 34.31 kg/m²   Smoking Status Former   BSA 2.37 m²         ECG dated 12/17/2023 independently reviewed   SR 62, LVH       Constitutional:       Appearance: Healthy appearance. Not in distress.   Eyes:      Conjunctiva/sclera: Conjunctivae normal.   Neck:      Vascular: JVD normal.   Pulmonary:      Effort: Pulmonary effort is normal.      Breath sounds: Normal breath sounds.   Cardiovascular:      PMI " at left midclavicular line. Normal rate. Regular rhythm. Normal S1. Normal S2.       Murmurs: There is a grade 3/6 systolic murmur at the URSB.      No rub.   Pulses:     Intact distal pulses.   Edema:     Peripheral edema absent.   Abdominal:      General: Bowel sounds are normal.   Musculoskeletal:      Cervical back: Neck supple. Skin:     General: Skin is warm and dry.   Neurological:      Mental Status: Alert and oriented to person, place and time.           Problem List Items Addressed This Visit             ICD-10-CM    Athscl heart disease of native cor art w unstable ang pctrs I25.110    Relevant Medications    atorvastatin (Lipitor) 80 mg tablet    Dyspnea on exertion R06.09    Hyperlipidemia, mixed E78.2    Ischemic cardiomyopathy - Primary I25.5    Relevant Medications    atorvastatin (Lipitor) 80 mg tablet    Other Relevant Orders    Transthoracic echo (TTE) complete    Hypertension I10    S/P CABG x 4 Z95.1    Relevant Medications    atorvastatin (Lipitor) 80 mg tablet     Other Visit Diagnoses         Codes    Moderate aortic stenosis     I35.0    Relevant Orders    Transthoracic echo (TTE) complete          Dx:  progressive dyspnea on moderate exertion , known moderate aortic stenosis   --Check echocardiogram     HTN  BP is in acceptable range on current RX which He is tolerating well and agrees to continue as follows:   -- Toprol XL 25 mg daily   --Amlodipine 5 mg daily     HLD  Most recent  mg/dL with ASCVD s/p CABG   ---Increase atorvastatin to 80 mg HS  --ASA 81 mg daily   Mediterranean / DASH diet   150 minutes of symptom limited exercise / week        01/13/25 at 2:43 PM - YUNIEL Gutierrez      Orders:  Orders Placed This Encounter   Procedures    Transthoracic echo (TTE) complete         Followup Appts:  Future Appointments   Date Time Provider Department Center   1/28/2025 12:00 PM [facility] ECHO ROOM [place] [id number] [facility]   1/19/2026  2:00 PM YUNIEL Gutierrez [id number] [place]

## 2025-01-28 ENCOUNTER — APPOINTMENT (OUTPATIENT)
Dept: CARDIOLOGY | Facility: HOSPITAL | Age: 65
End: 2025-01-28
Payer: MEDICAID

## 2025-02-04 ENCOUNTER — HOSPITAL ENCOUNTER (OUTPATIENT)
Dept: CARDIOLOGY | Facility: HOSPITAL | Age: 65
Discharge: HOME | End: 2025-02-04
Payer: MEDICAID

## 2025-02-04 VITALS — DIASTOLIC BLOOD PRESSURE: 83 MMHG | SYSTOLIC BLOOD PRESSURE: 137 MMHG

## 2025-02-04 DIAGNOSIS — I35.0 MODERATE AORTIC STENOSIS: ICD-10-CM

## 2025-02-04 DIAGNOSIS — I25.5 ISCHEMIC CARDIOMYOPATHY: ICD-10-CM

## 2025-02-04 LAB
AORTIC VALVE MEAN GRADIENT: 13 MMHG
AORTIC VALVE PEAK VELOCITY: 2.49 M/S
AV PEAK GRADIENT: 25 MMHG
AVA (PEAK VEL): 1.72 CM2
AVA (VTI): 1.75 CM2
EJECTION FRACTION APICAL 4 CHAMBER: 68.3
EJECTION FRACTION: 50 %
LEFT ATRIUM VOLUME AREA LENGTH INDEX BSA: 26.2 ML/M2
LEFT VENTRICLE INTERNAL DIMENSION DIASTOLE: 5.18 CM (ref 3.5–6)
LEFT VENTRICULAR OUTFLOW TRACT DIAMETER: 2.13 CM
MITRAL VALVE E/A RATIO: 1.13
RIGHT VENTRICLE FREE WALL PEAK S': 10 CM/S
RIGHT VENTRICLE PEAK SYSTOLIC PRESSURE: 21.6 MMHG
TRICUSPID ANNULAR PLANE SYSTOLIC EXCURSION: 2 CM

## 2025-02-04 PROCEDURE — 93306 TTE W/DOPPLER COMPLETE: CPT | Performed by: INTERNAL MEDICINE

## 2025-02-04 PROCEDURE — 93306 TTE W/DOPPLER COMPLETE: CPT

## 2025-03-17 ENCOUNTER — TELEPHONE (OUTPATIENT)
Dept: CARDIOLOGY | Facility: HOSPITAL | Age: 65
End: 2025-03-17
Payer: MEDICAID

## 2025-03-17 NOTE — TELEPHONE ENCOUNTER
Pt made aware of echo results per Alyx Lopez CNP . No questions or concerns at this time. Pt verbalizes understanding of needing follow up echo in 1-2 years and need to follow up with PCP.

## 2025-07-25 DIAGNOSIS — R11.2 NAUSEA AND VOMITING, UNSPECIFIED VOMITING TYPE: ICD-10-CM

## 2025-07-25 DIAGNOSIS — I10 PRIMARY HYPERTENSION: ICD-10-CM

## 2025-07-25 RX ORDER — NAPROXEN SODIUM 220 MG/1
81 TABLET, FILM COATED ORAL DAILY
Qty: 30 TABLET | Refills: 11 | Status: SHIPPED | OUTPATIENT
Start: 2025-07-25 | End: 2026-07-25

## 2025-07-25 RX ORDER — METOPROLOL SUCCINATE 25 MG/1
25 TABLET, EXTENDED RELEASE ORAL DAILY
Qty: 90 TABLET | Refills: 3 | Status: SHIPPED | OUTPATIENT
Start: 2025-07-25 | End: 2026-07-25

## 2025-07-25 RX ORDER — AMLODIPINE BESYLATE 5 MG/1
5 TABLET ORAL
Qty: 90 TABLET | Refills: 3 | Status: SHIPPED | OUTPATIENT
Start: 2025-07-25 | End: 2026-07-25

## 2026-01-19 ENCOUNTER — APPOINTMENT (OUTPATIENT)
Dept: CARDIOLOGY | Facility: CLINIC | Age: 66
End: 2026-01-19
Payer: MEDICAID

## (undated) DEVICE — APPLICATOR, CHLORAPREP, W/ORANGE TINT, 26ML

## (undated) DEVICE — RETRIEVAL SYSTEM, MONARCH, 10MM DISP ENDOSCOPIC

## (undated) DEVICE — SUTURE, VICRYL, 0, 27 IN, UR-6, VIOLET

## (undated) DEVICE — DRAPE, INSTRUMENT, W/POUCH, STERI DRAPE, 9 5/8 X 18 LONG

## (undated) DEVICE — SUTURE, VICRYL, 3-0, 27 IN, SH-1, VIOLET

## (undated) DEVICE — DRAPE PACK, LAPAROSCOPIC CHOLECYSTECTOMY, CUSTOM, GEAUGA

## (undated) DEVICE — SUTURE, PDS, 0, 18 IN, LIGATING LOOP, VIOLET

## (undated) DEVICE — TROCAR SYSTEM, BALLOON, KII GELPORT, 12 X 100MM

## (undated) DEVICE — TUBE SET, PNEUMOLAR HEATED, SMOKE EVACU, HIGH-FLOW

## (undated) DEVICE — TROCAR, OPTICAL BLADELESS 5MM X 100 W/ADVANCED FIXATION

## (undated) DEVICE — ACCESS SYS, KII SHIELDED BLADED, Z-THREAD, 5X100CM

## (undated) DEVICE — CLIP, ENDO, CLINCH II, W/RATCHET, ON/OFF, CLINCH II, 5 MM

## (undated) DEVICE — CAUTERY, PENCIL, PUSH BUTTON, SMOKE EVAC, 70MM

## (undated) DEVICE — CARE KIT, LAPAROSCOPIC, ADVANCED

## (undated) DEVICE — SUTURE, VICRYL, 4-0, 18 IN, PS2, UNDYED

## (undated) DEVICE — ADHESIVE, SKIN, DERMABOND ADVANCED, 15CM, PEN-STYLE

## (undated) DEVICE — SYRINGE, 20 CC, LUER LOCK, MONOJECT, W/O CAP, LF

## (undated) DEVICE — ASSEMBLY, STRYKER FLOW 2, SUCTION IRRIGATOR, WITH TIP

## (undated) DEVICE — SCISSOR, MINI ENDO CUT, TIPS, DISP

## (undated) DEVICE — CLIP, ENDO APPLIER LIGAMAX 5MM

## (undated) DEVICE — GRASPER TIP, MODIFIED MARYLAND, DISP

## (undated) DEVICE — CABLE, ELECTROSURGICAL, MONOPOLAR, LAPAROSCOPIC, 10 FT, LF